# Patient Record
Sex: FEMALE | Race: BLACK OR AFRICAN AMERICAN | NOT HISPANIC OR LATINO | Employment: FULL TIME | URBAN - METROPOLITAN AREA
[De-identification: names, ages, dates, MRNs, and addresses within clinical notes are randomized per-mention and may not be internally consistent; named-entity substitution may affect disease eponyms.]

---

## 2023-05-26 LAB
ALANINE AMINOTRANSFERASE (SGPT) (U/L) IN SER/PLAS: 16 U/L (ref 7–45)
ALBUMIN (G/DL) IN SER/PLAS: 3.7 G/DL (ref 3.4–5)
ALKALINE PHOSPHATASE (U/L) IN SER/PLAS: 58 U/L (ref 33–110)
ANION GAP IN SER/PLAS: 10 MMOL/L (ref 10–20)
ASPARTATE AMINOTRANSFERASE (SGOT) (U/L) IN SER/PLAS: 13 U/L (ref 9–39)
BILIRUBIN TOTAL (MG/DL) IN SER/PLAS: 0.5 MG/DL (ref 0–1.2)
CALCIDIOL (25 OH VITAMIN D3) (NG/ML) IN SER/PLAS: 51 NG/ML
CALCIUM (MG/DL) IN SER/PLAS: 8.4 MG/DL (ref 8.6–10.3)
CARBON DIOXIDE, TOTAL (MMOL/L) IN SER/PLAS: 30 MMOL/L (ref 21–32)
CHLORIDE (MMOL/L) IN SER/PLAS: 103 MMOL/L (ref 98–107)
CHOLESTEROL (MG/DL) IN SER/PLAS: 181 MG/DL (ref 0–199)
CHOLESTEROL IN HDL (MG/DL) IN SER/PLAS: 44.4 MG/DL
CHOLESTEROL/HDL RATIO: 4.1
CREATININE (MG/DL) IN SER/PLAS: 0.68 MG/DL (ref 0.5–1.05)
ERYTHROCYTE DISTRIBUTION WIDTH (RATIO) BY AUTOMATED COUNT: 15.1 % (ref 11.5–14.5)
ERYTHROCYTE MEAN CORPUSCULAR HEMOGLOBIN CONCENTRATION (G/DL) BY AUTOMATED: 31.7 G/DL (ref 32–36)
ERYTHROCYTE MEAN CORPUSCULAR VOLUME (FL) BY AUTOMATED COUNT: 93 FL (ref 80–100)
ERYTHROCYTES (10*6/UL) IN BLOOD BY AUTOMATED COUNT: 4.22 X10E12/L (ref 4–5.2)
ESTIMATED AVERAGE GLUCOSE FOR HBA1C: 137 MG/DL
GFR FEMALE: >90 ML/MIN/1.73M2
GLUCOSE (MG/DL) IN SER/PLAS: 106 MG/DL (ref 74–99)
HEMATOCRIT (%) IN BLOOD BY AUTOMATED COUNT: 39.1 % (ref 36–46)
HEMOGLOBIN (G/DL) IN BLOOD: 12.4 G/DL (ref 12–16)
HEMOGLOBIN A1C/HEMOGLOBIN TOTAL IN BLOOD: 6.4 %
LDL: 117 MG/DL (ref 0–99)
LEUKOCYTES (10*3/UL) IN BLOOD BY AUTOMATED COUNT: 5.4 X10E9/L (ref 4.4–11.3)
PLATELETS (10*3/UL) IN BLOOD AUTOMATED COUNT: 298 X10E9/L (ref 150–450)
POTASSIUM (MMOL/L) IN SER/PLAS: 4.2 MMOL/L (ref 3.5–5.3)
PROTEIN TOTAL: 6 G/DL (ref 6.4–8.2)
SODIUM (MMOL/L) IN SER/PLAS: 139 MMOL/L (ref 136–145)
THYROTROPIN (MIU/L) IN SER/PLAS BY DETECTION LIMIT <= 0.05 MIU/L: 2.14 MIU/L (ref 0.44–3.98)
TRIGLYCERIDE (MG/DL) IN SER/PLAS: 96 MG/DL (ref 0–149)
UREA NITROGEN (MG/DL) IN SER/PLAS: 15 MG/DL (ref 6–23)
VLDL: 19 MG/DL (ref 0–40)

## 2023-06-12 ENCOUNTER — PATIENT OUTREACH (OUTPATIENT)
Dept: CARE COORDINATION | Facility: CLINIC | Age: 53
End: 2023-06-12
Payer: COMMERCIAL

## 2023-06-13 ENCOUNTER — PATIENT OUTREACH (OUTPATIENT)
Dept: CARE COORDINATION | Facility: CLINIC | Age: 53
End: 2023-06-13
Payer: COMMERCIAL

## 2023-08-30 ENCOUNTER — HOSPITAL ENCOUNTER (OUTPATIENT)
Dept: DATA CONVERSION | Facility: HOSPITAL | Age: 53
End: 2023-08-30
Attending: INTERNAL MEDICINE | Admitting: INTERNAL MEDICINE
Payer: COMMERCIAL

## 2023-08-30 DIAGNOSIS — R07.9 CHEST PAIN, UNSPECIFIED: ICD-10-CM

## 2023-08-30 DIAGNOSIS — K31.7 POLYP OF STOMACH AND DUODENUM: ICD-10-CM

## 2023-08-30 DIAGNOSIS — K44.9 DIAPHRAGMATIC HERNIA WITHOUT OBSTRUCTION OR GANGRENE: ICD-10-CM

## 2023-09-06 LAB
COMPLETE PATHOLOGY REPORT: NORMAL
CONVERTED CLINICAL DIAGNOSIS-HISTORY: NORMAL
CONVERTED FINAL DIAGNOSIS: NORMAL
CONVERTED FINAL REPORT PDF LINK TO COPY AND PASTE: NORMAL
CONVERTED GROSS DESCRIPTION: NORMAL

## 2023-09-25 LAB
ALANINE AMINOTRANSFERASE (SGPT) (U/L) IN SER/PLAS: 16 U/L (ref 7–45)
ALBUMIN (G/DL) IN SER/PLAS: 3.8 G/DL (ref 3.4–5)
ALKALINE PHOSPHATASE (U/L) IN SER/PLAS: 56 U/L (ref 33–110)
ANION GAP IN SER/PLAS: 14 MMOL/L (ref 10–20)
ASPARTATE AMINOTRANSFERASE (SGOT) (U/L) IN SER/PLAS: 12 U/L (ref 9–39)
BILIRUBIN TOTAL (MG/DL) IN SER/PLAS: 0.4 MG/DL (ref 0–1.2)
CALCIUM (MG/DL) IN SER/PLAS: 8.5 MG/DL (ref 8.6–10.3)
CARBON DIOXIDE, TOTAL (MMOL/L) IN SER/PLAS: 28 MMOL/L (ref 21–32)
CHLORIDE (MMOL/L) IN SER/PLAS: 103 MMOL/L (ref 98–107)
CHOLESTEROL (MG/DL) IN SER/PLAS: 176 MG/DL (ref 0–199)
CHOLESTEROL IN HDL (MG/DL) IN SER/PLAS: 46.7 MG/DL
CHOLESTEROL/HDL RATIO: 3.8
CREATININE (MG/DL) IN SER/PLAS: 0.75 MG/DL (ref 0.5–1.05)
ESTIMATED AVERAGE GLUCOSE FOR HBA1C: 137 MG/DL
GFR FEMALE: >90 ML/MIN/1.73M2
GLUCOSE (MG/DL) IN SER/PLAS: 125 MG/DL (ref 74–99)
HEMOGLOBIN A1C/HEMOGLOBIN TOTAL IN BLOOD: 6.4 %
LDL: 107 MG/DL (ref 0–99)
POTASSIUM (MMOL/L) IN SER/PLAS: 3.5 MMOL/L (ref 3.5–5.3)
PROTEIN TOTAL: 6.8 G/DL (ref 6.4–8.2)
SODIUM (MMOL/L) IN SER/PLAS: 141 MMOL/L (ref 136–145)
TRIGLYCERIDE (MG/DL) IN SER/PLAS: 113 MG/DL (ref 0–149)
UREA NITROGEN (MG/DL) IN SER/PLAS: 17 MG/DL (ref 6–23)
VLDL: 23 MG/DL (ref 0–40)

## 2023-10-04 ENCOUNTER — LAB (OUTPATIENT)
Dept: LAB | Facility: LAB | Age: 53
End: 2023-10-04
Payer: COMMERCIAL

## 2023-10-04 DIAGNOSIS — I50.32 CHRONIC DIASTOLIC (CONGESTIVE) HEART FAILURE (MULTI): Primary | ICD-10-CM

## 2023-10-04 DIAGNOSIS — E55.9 VITAMIN D DEFICIENCY, UNSPECIFIED: ICD-10-CM

## 2023-10-04 LAB — 25(OH)D3 SERPL-MCNC: 58 NG/ML (ref 30–100)

## 2023-10-04 PROCEDURE — 36415 COLL VENOUS BLD VENIPUNCTURE: CPT

## 2023-10-07 ENCOUNTER — HOSPITAL ENCOUNTER (EMERGENCY)
Facility: HOSPITAL | Age: 53
Discharge: HOME | End: 2023-10-08
Payer: COMMERCIAL

## 2023-10-07 ENCOUNTER — APPOINTMENT (OUTPATIENT)
Dept: RADIOLOGY | Facility: HOSPITAL | Age: 53
End: 2023-10-07
Payer: COMMERCIAL

## 2023-10-07 DIAGNOSIS — M71.22 BAKER'S CYST OF KNEE, LEFT: ICD-10-CM

## 2023-10-07 DIAGNOSIS — M25.461 SWELLING OF BOTH KNEES: Primary | ICD-10-CM

## 2023-10-07 DIAGNOSIS — M25.462 SWELLING OF BOTH KNEES: Primary | ICD-10-CM

## 2023-10-07 PROBLEM — I48.91 ATRIAL FIBRILLATION (MULTI): Status: ACTIVE | Noted: 2023-10-07

## 2023-10-07 LAB
ALBUMIN SERPL BCP-MCNC: 4 G/DL (ref 3.4–5)
ALP SERPL-CCNC: 56 U/L (ref 33–110)
ALT SERPL W P-5'-P-CCNC: 14 U/L (ref 7–45)
ANION GAP SERPL CALC-SCNC: 14 MMOL/L (ref 10–20)
AST SERPL W P-5'-P-CCNC: 13 U/L (ref 9–39)
BASOPHILS # BLD AUTO: 0.02 X10*3/UL (ref 0–0.1)
BASOPHILS NFR BLD AUTO: 0.3 %
BILIRUB SERPL-MCNC: 0.5 MG/DL (ref 0–1.2)
BUN SERPL-MCNC: 20 MG/DL (ref 6–23)
CALCIUM SERPL-MCNC: 9.3 MG/DL (ref 8.6–10.3)
CARDIAC TROPONIN I PNL SERPL HS: 3 NG/L (ref 0–13)
CARDIAC TROPONIN I PNL SERPL HS: <3 NG/L (ref 0–13)
CHLORIDE SERPL-SCNC: 103 MMOL/L (ref 98–107)
CO2 SERPL-SCNC: 27 MMOL/L (ref 21–32)
CREAT SERPL-MCNC: 0.84 MG/DL (ref 0.5–1.05)
D DIMER PPP FEU-MCNC: 1.6 MG/L FEU (ref 0.19–0.5)
EOSINOPHIL # BLD AUTO: 0.12 X10*3/UL (ref 0–0.7)
EOSINOPHIL NFR BLD AUTO: 1.8 %
ERYTHROCYTE [DISTWIDTH] IN BLOOD BY AUTOMATED COUNT: 13.9 % (ref 11.5–14.5)
GFR SERPL CREATININE-BSD FRML MDRD: 84 ML/MIN/1.73M*2
GLUCOSE SERPL-MCNC: 88 MG/DL (ref 74–99)
HCT VFR BLD AUTO: 38.2 % (ref 36–46)
HGB BLD-MCNC: 12.3 G/DL (ref 12–16)
IMM GRANULOCYTES # BLD AUTO: 0.01 X10*3/UL (ref 0–0.7)
IMM GRANULOCYTES NFR BLD AUTO: 0.1 % (ref 0–0.9)
LYMPHOCYTES # BLD AUTO: 2.35 X10*3/UL (ref 1.2–4.8)
LYMPHOCYTES NFR BLD AUTO: 34.6 %
MCH RBC QN AUTO: 29.5 PG (ref 26–34)
MCHC RBC AUTO-ENTMCNC: 32.2 G/DL (ref 32–36)
MCV RBC AUTO: 92 FL (ref 80–100)
MONOCYTES # BLD AUTO: 0.83 X10*3/UL (ref 0.1–1)
MONOCYTES NFR BLD AUTO: 12.2 %
NEUTROPHILS # BLD AUTO: 3.47 X10*3/UL (ref 1.2–7.7)
NEUTROPHILS NFR BLD AUTO: 51 %
NRBC BLD-RTO: NORMAL /100{WBCS}
PLATELET # BLD AUTO: 278 X10*3/UL (ref 150–450)
PMV BLD AUTO: 10.5 FL (ref 7.5–11.5)
POTASSIUM SERPL-SCNC: 3.9 MMOL/L (ref 3.5–5.3)
PROT SERPL-MCNC: 7.2 G/DL (ref 6.4–8.2)
RBC # BLD AUTO: 4.17 X10*6/UL (ref 4–5.2)
SODIUM SERPL-SCNC: 140 MMOL/L (ref 136–145)
WBC # BLD AUTO: 6.8 X10*3/UL (ref 4.4–11.3)

## 2023-10-07 PROCEDURE — 99285 EMERGENCY DEPT VISIT HI MDM: CPT | Mod: 25

## 2023-10-07 PROCEDURE — 2550000001 HC RX 255 CONTRASTS: Performed by: STUDENT IN AN ORGANIZED HEALTH CARE EDUCATION/TRAINING PROGRAM

## 2023-10-07 PROCEDURE — 84484 ASSAY OF TROPONIN QUANT: CPT | Performed by: STUDENT IN AN ORGANIZED HEALTH CARE EDUCATION/TRAINING PROGRAM

## 2023-10-07 PROCEDURE — 36415 COLL VENOUS BLD VENIPUNCTURE: CPT | Performed by: STUDENT IN AN ORGANIZED HEALTH CARE EDUCATION/TRAINING PROGRAM

## 2023-10-07 PROCEDURE — 96374 THER/PROPH/DIAG INJ IV PUSH: CPT | Mod: XU

## 2023-10-07 PROCEDURE — 99284 EMERGENCY DEPT VISIT MOD MDM: CPT

## 2023-10-07 PROCEDURE — 80053 COMPREHEN METABOLIC PANEL: CPT | Performed by: STUDENT IN AN ORGANIZED HEALTH CARE EDUCATION/TRAINING PROGRAM

## 2023-10-07 PROCEDURE — 71275 CT ANGIOGRAPHY CHEST: CPT

## 2023-10-07 PROCEDURE — 85025 COMPLETE CBC W/AUTO DIFF WBC: CPT | Performed by: STUDENT IN AN ORGANIZED HEALTH CARE EDUCATION/TRAINING PROGRAM

## 2023-10-07 PROCEDURE — 2500000001 HC RX 250 WO HCPCS SELF ADMINISTERED DRUGS (ALT 637 FOR MEDICARE OP): Performed by: STUDENT IN AN ORGANIZED HEALTH CARE EDUCATION/TRAINING PROGRAM

## 2023-10-07 PROCEDURE — 2500000004 HC RX 250 GENERAL PHARMACY W/ HCPCS (ALT 636 FOR OP/ED): Performed by: STUDENT IN AN ORGANIZED HEALTH CARE EDUCATION/TRAINING PROGRAM

## 2023-10-07 PROCEDURE — 85300 ANTITHROMBIN III ACTIVITY: CPT | Performed by: STUDENT IN AN ORGANIZED HEALTH CARE EDUCATION/TRAINING PROGRAM

## 2023-10-07 RX ORDER — OMEPRAZOLE 40 MG/1
1 CAPSULE, DELAYED RELEASE ORAL EVERY 24 HOURS
COMMUNITY

## 2023-10-07 RX ORDER — MONTELUKAST SODIUM 10 MG/1
10 TABLET ORAL EVERY 24 HOURS
COMMUNITY

## 2023-10-07 RX ORDER — ERGOCALCIFEROL 1.25 MG/1
1 CAPSULE ORAL
Status: ON HOLD | COMMUNITY
End: 2023-11-28 | Stop reason: WASHOUT

## 2023-10-07 RX ORDER — ALBUTEROL SULFATE 0.83 MG/ML
2.5 SOLUTION RESPIRATORY (INHALATION) EVERY 4 HOURS PRN
Status: ON HOLD | COMMUNITY
Start: 2022-07-25 | End: 2023-11-28 | Stop reason: WASHOUT

## 2023-10-07 RX ORDER — VERAPAMIL HYDROCHLORIDE 300 MG/1
300 CAPSULE, EXTENDED RELEASE ORAL NIGHTLY
COMMUNITY

## 2023-10-07 RX ORDER — LISINOPRIL 40 MG/1
40 TABLET ORAL DAILY
COMMUNITY

## 2023-10-07 RX ORDER — POTASSIUM CHLORIDE 1500 MG/1
20 TABLET, EXTENDED RELEASE ORAL 2 TIMES DAILY
Status: ON HOLD | COMMUNITY
Start: 2023-10-03 | End: 2023-11-29 | Stop reason: SDUPTHER

## 2023-10-07 RX ORDER — ONDANSETRON 4 MG/1
4 TABLET, FILM COATED ORAL EVERY 6 HOURS PRN
COMMUNITY
Start: 2021-01-04

## 2023-10-07 RX ORDER — METOPROLOL TARTRATE 100 MG/1
100 TABLET ORAL 2 TIMES DAILY
COMMUNITY

## 2023-10-07 RX ORDER — OXYCODONE AND ACETAMINOPHEN 5; 325 MG/1; MG/1
1 TABLET ORAL ONCE
Status: COMPLETED | OUTPATIENT
Start: 2023-10-07 | End: 2023-10-07

## 2023-10-07 RX ORDER — FLECAINIDE ACETATE 100 MG/1
100 TABLET ORAL 2 TIMES DAILY
COMMUNITY
Start: 2015-10-14

## 2023-10-07 RX ORDER — ATORVASTATIN CALCIUM 40 MG/1
40 TABLET, FILM COATED ORAL DAILY
COMMUNITY
Start: 2020-09-19

## 2023-10-07 RX ORDER — FUROSEMIDE 40 MG/1
40 TABLET ORAL 2 TIMES DAILY
Status: ON HOLD | COMMUNITY
Start: 2022-11-01 | End: 2023-11-28

## 2023-10-07 RX ORDER — ALBUTEROL SULFATE 90 UG/1
AEROSOL, METERED RESPIRATORY (INHALATION) EVERY 4 HOURS
COMMUNITY
Start: 2022-01-19

## 2023-10-07 RX ORDER — MORPHINE SULFATE 4 MG/ML
4 INJECTION, SOLUTION INTRAMUSCULAR; INTRAVENOUS ONCE
Status: COMPLETED | OUTPATIENT
Start: 2023-10-07 | End: 2023-10-07

## 2023-10-07 RX ORDER — HYDROGEN PEROXIDE 3 %
1 SOLUTION, NON-ORAL MISCELLANEOUS
Status: ON HOLD | COMMUNITY
Start: 2021-02-16 | End: 2023-11-28 | Stop reason: WASHOUT

## 2023-10-07 RX ORDER — MECLIZINE HYDROCHLORIDE 25 MG/1
25 TABLET ORAL EVERY 12 HOURS PRN
COMMUNITY

## 2023-10-07 RX ADMIN — OXYCODONE HYDROCHLORIDE AND ACETAMINOPHEN 1 TABLET: 5; 325 TABLET ORAL at 21:38

## 2023-10-07 RX ADMIN — MORPHINE SULFATE 4 MG: 4 INJECTION, SOLUTION INTRAMUSCULAR; INTRAVENOUS at 20:54

## 2023-10-07 RX ADMIN — IOHEXOL 100 ML: 350 INJECTION, SOLUTION INTRAVENOUS at 21:30

## 2023-10-07 ASSESSMENT — PAIN DESCRIPTION - LOCATION
LOCATION: CHEST
LOCATION_2: LEG
LOCATION: CHEST
LOCATION: CHEST

## 2023-10-07 ASSESSMENT — PAIN DESCRIPTION - PROGRESSION: CLINICAL_PROGRESSION: GRADUALLY IMPROVING

## 2023-10-07 ASSESSMENT — PAIN SCALES - GENERAL
PAINLEVEL_OUTOF10: 10 - WORST POSSIBLE PAIN
PAINLEVEL_OUTOF10: 10 - WORST POSSIBLE PAIN
PAINLEVEL_OUTOF10: 5 - MODERATE PAIN
PAINLEVEL_OUTOF10: 10 - WORST POSSIBLE PAIN
PAINLEVEL_OUTOF10: 10 - WORST POSSIBLE PAIN
PAINLEVEL_OUTOF10: 8
PAINLEVEL_OUTOF10: 8
PAINLEVEL_OUTOF10: 10 - WORST POSSIBLE PAIN
PAINLEVEL_OUTOF10: 3

## 2023-10-07 ASSESSMENT — PAIN DESCRIPTION - DESCRIPTORS
DESCRIPTORS: TIGHTNESS;CRAMPING
DESCRIPTORS: PRESSURE;TIGHTNESS
DESCRIPTORS: PRESSURE;OTHER (COMMENT)
DESCRIPTORS: PRESSURE;TIGHTNESS
DESCRIPTORS: PRESSURE;TIGHTNESS
DESCRIPTORS: TIGHTNESS;PRESSURE
DESCRIPTORS: TIGHTNESS;CRAMPING

## 2023-10-07 ASSESSMENT — COLUMBIA-SUICIDE SEVERITY RATING SCALE - C-SSRS
2. HAVE YOU ACTUALLY HAD ANY THOUGHTS OF KILLING YOURSELF?: NO
6. HAVE YOU EVER DONE ANYTHING, STARTED TO DO ANYTHING, OR PREPARED TO DO ANYTHING TO END YOUR LIFE?: NO
1. IN THE PAST MONTH, HAVE YOU WISHED YOU WERE DEAD OR WISHED YOU COULD GO TO SLEEP AND NOT WAKE UP?: NO

## 2023-10-07 ASSESSMENT — PAIN DESCRIPTION - ORIENTATION
ORIENTATION_2: LEFT
ORIENTATION: MID

## 2023-10-07 ASSESSMENT — PAIN DESCRIPTION - PAIN TYPE
TYPE: ACUTE PAIN

## 2023-10-07 ASSESSMENT — PAIN - FUNCTIONAL ASSESSMENT
PAIN_FUNCTIONAL_ASSESSMENT: 0-10
PAIN_FUNCTIONAL_ASSESSMENT: 0-10

## 2023-10-07 ASSESSMENT — PAIN DESCRIPTION - ONSET
ONSET: ONGOING
ONSET: ONGOING

## 2023-10-07 ASSESSMENT — PAIN DESCRIPTION - FREQUENCY
FREQUENCY: CONSTANT/CONTINUOUS

## 2023-10-08 ENCOUNTER — APPOINTMENT (OUTPATIENT)
Dept: RADIOLOGY | Facility: HOSPITAL | Age: 53
End: 2023-10-08
Payer: COMMERCIAL

## 2023-10-08 VITALS
TEMPERATURE: 97.8 F | DIASTOLIC BLOOD PRESSURE: 68 MMHG | OXYGEN SATURATION: 99 % | WEIGHT: 288.36 LBS | RESPIRATION RATE: 14 BRPM | HEIGHT: 65 IN | SYSTOLIC BLOOD PRESSURE: 101 MMHG | HEART RATE: 64 BPM | BODY MASS INDEX: 48.04 KG/M2

## 2023-10-08 PROCEDURE — 2500000004 HC RX 250 GENERAL PHARMACY W/ HCPCS (ALT 636 FOR OP/ED): Performed by: STUDENT IN AN ORGANIZED HEALTH CARE EDUCATION/TRAINING PROGRAM

## 2023-10-08 PROCEDURE — 93970 EXTREMITY STUDY: CPT

## 2023-10-08 RX ORDER — PREDNISONE 20 MG/1
40 TABLET ORAL ONCE
Status: COMPLETED | OUTPATIENT
Start: 2023-10-08 | End: 2023-10-08

## 2023-10-08 RX ORDER — METHYLPREDNISOLONE 4 MG/1
TABLET ORAL
Qty: 21 TABLET | Refills: 0 | Status: SHIPPED | OUTPATIENT
Start: 2023-10-08 | End: 2023-10-15

## 2023-10-08 RX ORDER — DICLOFENAC SODIUM 10 MG/G
4 GEL TOPICAL 4 TIMES DAILY PRN
Qty: 100 G | Refills: 0 | Status: SHIPPED | OUTPATIENT
Start: 2023-10-08

## 2023-10-08 RX ADMIN — PREDNISONE 40 MG: 20 TABLET ORAL at 00:58

## 2023-10-08 ASSESSMENT — PAIN DESCRIPTION - LOCATION
LOCATION_2: LEG
LOCATION: CHEST

## 2023-10-08 ASSESSMENT — PAIN DESCRIPTION - PAIN TYPE: TYPE_2: ACUTE PAIN

## 2023-10-08 ASSESSMENT — PAIN DESCRIPTION - DESCRIPTORS: DESCRIPTORS_2: CRAMPING;TIGHTNESS

## 2023-10-08 ASSESSMENT — PAIN DESCRIPTION - PROGRESSION: CLINICAL_PROGRESSION_2: NOT CHANGED

## 2023-10-08 ASSESSMENT — PAIN SCALES - GENERAL
PAINLEVEL_OUTOF10: 10 - WORST POSSIBLE PAIN
PAINLEVEL_OUTOF10: 0 - NO PAIN

## 2023-10-08 ASSESSMENT — PAIN - FUNCTIONAL ASSESSMENT: PAIN_FUNCTIONAL_ASSESSMENT: 0-10

## 2023-10-08 NOTE — ED TRIAGE NOTES
Patient triage completed. Patient Aox4. Currently VSS, c/o chest pain and LLE pain. On monitor w/ VS Q5 minutes. Labs and ECG completed.

## 2023-10-08 NOTE — ED PROVIDER NOTES
HPI   Chief Complaint   Patient presents with    Weakness, Gen    Dizziness    Chest Pain    Left Leg pain     Left calf pain had started days ago per patient then improved, now both legs swollen w/ Left leg pain in calf 10/10.       This is a 53-year-old female with history of atrial fibrillation and history of DVT on Eliquis who presents to the ED with several days of left lower extremity swelling/pain, chest heaviness, dyspnea, and lightheadedness.  States that her symptoms feel similar to when she had DVT/PE in the past.  She does report that she had endoscopy done a few weeks ago when she had to hold her Eliquis for 3 days, and also reports missing a dose recently on accident.  Denies any cough, fever/chills, or GI complaints.           Review of Systems   All other systems reviewed and are negative.                   Iman Coma Scale Score: 15                  Patient History   Past Medical History:   Diagnosis Date    Asthma     Atrial fibrillation (CMS/HCC)     Hypertension     Personal history of other diseases of the circulatory system     History of hypertension    Personal history of other specified conditions     History of ulceration     Past Surgical History:   Procedure Laterality Date    CT ANGIO CORONARY ART WITH HEARTFLOW IF SCORE >30%  11/18/2022    CT HEART CORONARY ANGIOGRAM 11/18/2022 DOCTOR OFFICE LEGACY    FOOT SURGERY  03/19/2018    Foot Surgery    HYSTERECTOMY  03/19/2018    Hysterectomy    KNEE ARTHROSCOPY W/ DEBRIDEMENT  10/21/2013    Arthroscopy Knee Right    MR HEAD ANGIO WO IV CONTRAST  4/5/2019    MR HEAD ANGIO WO IV CONTRAST 4/5/2019 [facility] ANCILLARY LEGACY    MR NECK ANGIO WO IV CONTRAST  4/5/2019    MR NECK ANGIO WO IV CONTRAST 4/5/2019 [facility] ANCILLARY LEGACY     No family history on file.  Social History     Tobacco Use    Smoking status: Never    Smokeless tobacco: Never   Vaping Use    Vaping Use: Never used   Substance Use Topics    Alcohol use: Yes     Alcohol/week: 2.0 standard  drinks of alcohol     Types: 2 Standard drinks or equivalent per week    Drug use: Never       Physical Exam   ED Triage Vitals [10/07/23 1943]   Temp Heart Rate Resp BP   37 °C (98.6 °F) 69 18 (!) 147/95      SpO2 Temp Source Heart Rate Source Patient Position   97 % Oral Monitor Lying      BP Location FiO2 (%)     Right arm --       Physical Exam  Vitals and nursing note reviewed.   Constitutional:       General: She is not in acute distress.     Appearance: She is well-developed.   HENT:      Head: Normocephalic and atraumatic.   Eyes:      Conjunctiva/sclera: Conjunctivae normal.   Cardiovascular:      Rate and Rhythm: Normal rate and regular rhythm.      Heart sounds: No murmur heard.  Pulmonary:      Effort: Pulmonary effort is normal. No respiratory distress.      Breath sounds: Normal breath sounds.   Abdominal:      Palpations: Abdomen is soft.      Tenderness: There is no abdominal tenderness.   Musculoskeletal:         General: No swelling.      Cervical back: Neck supple.      Left lower le+ Pitting Edema present.   Skin:     General: Skin is warm and dry.   Neurological:      General: No focal deficit present.      Mental Status: She is alert and oriented to person, place, and time.   Psychiatric:         Mood and Affect: Mood normal.         Behavior: Behavior normal.       Labs Reviewed   D-DIMER, NON VTE - Abnormal       Result Value    D-Dimer Non VTE, Quant (mg/L FEU) 1.60 (*)     Narrative:     THROMBOEMBOLIC EVENTS CANNOT BE EXCLUDED SOLELY ON THE BASIS OF THE D-DIMER LEVEL BEING WITHIN THE NORMAL REFERENCE RANGE. D-DIMER LEVELS LESS THAN 0.5 MG/L FEU IN CONJUNCTION WITH A LOW CLINICAL PROBABILITY HAVE AN EXCELLENT NEGATIVE PREDICTIVE VALUE IN EXCLUDING A DIAGNOSIS OF PULMONARY EMBOLUS (PE) OR DEEP VEIN THROMBOSIS (DVT). ELEVATED D-DIMER LEVELS ARE NOT SPECIFIC TO PE OR DVT, AND MAY BE SEEN IN PATIENTS WITH DIC, ADVANCED AGE, PREGNANCY, MALIGNANCY, LIVER DISEASE, INFECTION, AND INFLAMMATORY  CONDITIONS AMONG OTHERS. D-DIMER LEVELS MAY BE DECREASED IN PATIENTS RECEIVING ANTI-COAGULATION THERAPY.   COMPREHENSIVE METABOLIC PANEL - Normal    Glucose 88      Sodium 140      Potassium 3.9      Chloride 103      Bicarbonate 27      Anion Gap 14      Urea Nitrogen 20      Creatinine 0.84      eGFR 84      Calcium 9.3      Albumin 4.0      Alkaline Phosphatase 56      Total Protein 7.2      AST 13      Bilirubin, Total 0.5      ALT 14     SERIAL TROPONIN-INITIAL - Normal    Troponin I, High Sensitivity 3     TROPONIN I, HIGH SENSITIVITY - Normal    Troponin I, High Sensitivity <3     TROPONIN SERIES- (INITIAL, 1 HR)    Narrative:     The following orders were created for panel order Troponin I Series, High Sensitivity (0, 1 HR).  Procedure                               Abnormality         Status                     ---------                               -----------         ------                     Troponin I, High Sensiti...[[id number]]  Normal              Final result               Troponin, High Sensitivi...[[id number]]                                                   Please view results for these tests on the individual orders.   CBC WITH AUTO DIFFERENTIAL    WBC 6.8      nRBC        RBC 4.17      Hemoglobin 12.3      Hematocrit 38.2      MCV 92      MCH 29.5      MCHC 32.2      RDW 13.9      Platelets 278      MPV 10.5      Neutrophils % 51.0      Immature Granulocytes %, Automated 0.1      Lymphocytes % 34.6      Monocytes % 12.2      Eosinophils % 1.8      Basophils % 0.3      Neutrophils Absolute 3.47      Immature Granulocytes Absolute, Automated 0.01      Lymphocytes Absolute 2.35      Monocytes Absolute 0.83      Eosinophils Absolute 0.12      Basophils Absolute 0.02       CT angio chest for pulmonary embolism   Final Result   1. No pulmonary embolus.   2. No acute intrathoracic process.             Signed by: Chucky Otoole 10/7/2023 10:31 PM   Dictation workstation:   [id number]      Vascular  US Lower Extremity Venous Duplex Bilateral    (Results Pending)     CT angio chest for pulmonary embolism   Final Result   1. No pulmonary embolus.   2. No acute intrathoracic process.             Signed by: Chucky Otoole 10/7/2023 10:31 PM   Dictation workstation:   [id number]      Vascular US Lower Extremity Venous Duplex Bilateral    (Results Pending)       ED Course & MDM   Diagnoses as of 10/08/23 0056   Baker's cyst of knee, left       Medical Decision Making  Patient presented to the ED with lower extremity swelling, chest pain, and dyspnea.  Cardiac work-up in the ED was negative including serial troponins, EKG, exam, and imaging.  D-dimer was elevated, and since patient had missed several doses of Eliquis recently, elected to do CTA of the chest and bilateral lower extremity venous duplexes.  No VTE was found.  Patient did have a complex Baker's cyst of the left popliteal fossa, which is likely causing her pain and distal swelling.  Recommended topical diclofenac and will also prescribe patient a Medrol Dosepak to help with the inflammation.  She has an appointment scheduled with her orthopedist next week regarding scheduling a TKA.  Chest pain and dyspnea resolved throughout the ED course, and patient was also medicated with morphine and Percocet with resolution of her pain.           Blanca Hernandez MD  10/08/23 0059

## 2023-10-17 PROBLEM — M75.102 LEFT ROTATOR CUFF TEAR: Status: ACTIVE | Noted: 2023-10-17

## 2023-10-17 PROBLEM — M79.89 SHOULDER SWELLING: Status: ACTIVE | Noted: 2023-10-17

## 2023-10-17 PROBLEM — K43.6 INCARCERATED EPIGASTRIC HERNIA: Status: ACTIVE | Noted: 2023-10-17

## 2023-10-17 PROBLEM — E66.01 MORBID OBESITY (MULTI): Status: ACTIVE | Noted: 2023-10-17

## 2023-10-17 PROBLEM — K76.9 LIVER LESION: Status: ACTIVE | Noted: 2023-10-17

## 2023-10-17 PROBLEM — M75.22 BICEPS TENDINITIS OF LEFT UPPER EXTREMITY: Status: ACTIVE | Noted: 2023-10-17

## 2023-10-17 PROBLEM — G44.049 CHRONIC PAROXYSMAL HEMICRANIA, NOT INTRACTABLE: Status: ACTIVE | Noted: 2019-06-05

## 2023-10-17 PROBLEM — R25.2 TRISMUS: Status: ACTIVE | Noted: 2019-09-05

## 2023-10-17 PROBLEM — R42 DIZZINESS: Status: ACTIVE | Noted: 2022-05-01

## 2023-10-17 PROBLEM — K21.9 GASTROESOPHAGEAL REFLUX DISEASE: Status: ACTIVE | Noted: 2022-11-30

## 2023-10-17 PROBLEM — R50.9 FEVER: Status: ACTIVE | Noted: 2023-10-17

## 2023-10-17 PROBLEM — K64.4 ANAL SKIN TAG: Status: ACTIVE | Noted: 2018-02-22

## 2023-10-17 PROBLEM — R11.0 NAUSEA: Status: ACTIVE | Noted: 2023-10-17

## 2023-10-17 PROBLEM — S00.93XA HEAD CONTUSION: Status: ACTIVE | Noted: 2023-10-17

## 2023-10-17 PROBLEM — N39.3 STRESS INCONTINENCE IN FEMALE: Status: ACTIVE | Noted: 2020-08-18

## 2023-10-17 PROBLEM — M75.42 IMPINGEMENT SYNDROME OF LEFT SHOULDER: Status: ACTIVE | Noted: 2023-10-17

## 2023-10-17 PROBLEM — M17.12 LEFT KNEE DJD: Status: ACTIVE | Noted: 2023-10-17

## 2023-10-17 PROBLEM — H93.8X1 EAR PRESSURE, RIGHT: Status: ACTIVE | Noted: 2019-08-06

## 2023-10-17 PROBLEM — M20.42 HAMMER TOE OF LEFT FOOT: Status: ACTIVE | Noted: 2023-02-16

## 2023-10-17 PROBLEM — J11.1 FLU SYNDROME: Status: ACTIVE | Noted: 2023-10-17

## 2023-10-17 PROBLEM — R35.1 NOCTURIA: Status: ACTIVE | Noted: 2021-08-23

## 2023-10-17 PROBLEM — J34.89 SINUS PRESSURE: Status: ACTIVE | Noted: 2019-06-02

## 2023-10-17 PROBLEM — H91.91 HEARING LOSS OF RIGHT EAR: Status: ACTIVE | Noted: 2019-08-06

## 2023-10-17 PROBLEM — I10 HYPERTENSION: Status: ACTIVE | Noted: 2023-10-17

## 2023-10-17 PROBLEM — H92.01 ACUTE OTALGIA, RIGHT: Status: ACTIVE | Noted: 2019-09-05

## 2023-10-17 PROBLEM — F40.243 FEAR OF FLYING: Status: ACTIVE | Noted: 2019-10-16

## 2023-10-17 PROBLEM — I73.9 CLAUDICATION (CMS-HCC): Status: ACTIVE | Noted: 2023-10-17

## 2023-10-17 PROBLEM — M25.462 EFFUSION OF LEFT KNEE: Status: ACTIVE | Noted: 2023-10-17

## 2023-10-17 PROBLEM — J34.1 CYST OF MAXILLARY SINUS: Status: ACTIVE | Noted: 2019-06-05

## 2023-10-17 PROBLEM — D16.9 OSTEOMA: Status: ACTIVE | Noted: 2023-10-17

## 2023-10-17 PROBLEM — M76.811 ANTERIOR TIBIALIS TENDINITIS OF RIGHT LOWER EXTREMITY: Status: ACTIVE | Noted: 2023-10-17

## 2023-10-17 PROBLEM — R14.0 GASSINESS: Status: ACTIVE | Noted: 2022-11-30

## 2023-10-17 PROBLEM — G45.9 TIA (TRANSIENT ISCHEMIC ATTACK): Status: ACTIVE | Noted: 2022-04-30

## 2023-10-17 RX ORDER — LORATADINE 10 MG/1
1 TABLET ORAL DAILY
COMMUNITY
Start: 2020-02-29

## 2023-10-17 RX ORDER — PANTOPRAZOLE SODIUM 40 MG/1
40 TABLET, DELAYED RELEASE ORAL
Status: ON HOLD | COMMUNITY
Start: 2022-11-01 | End: 2023-11-28 | Stop reason: WASHOUT

## 2023-10-17 RX ORDER — LIDOCAINE 50 MG/G
PATCH TOPICAL
COMMUNITY

## 2023-10-17 RX ORDER — METHOCARBAMOL 500 MG/1
TABLET, FILM COATED ORAL
COMMUNITY
Start: 2023-06-11

## 2023-10-17 RX ORDER — FLUTICASONE FUROATE, UMECLIDINIUM BROMIDE AND VILANTEROL TRIFENATATE 200; 62.5; 25 UG/1; UG/1; UG/1
1 POWDER RESPIRATORY (INHALATION)
Status: ON HOLD | COMMUNITY
Start: 2023-05-04 | End: 2023-11-28 | Stop reason: WASHOUT

## 2023-10-17 RX ORDER — AZELASTINE 1 MG/ML
1 SPRAY, METERED NASAL EVERY 12 HOURS PRN
COMMUNITY
Start: 2019-09-05

## 2023-10-17 RX ORDER — PREDNISONE 10 MG/1
10 TABLET ORAL
COMMUNITY
Start: 2022-11-01

## 2023-10-17 RX ORDER — OXYCODONE HYDROCHLORIDE 5 MG/1
5 TABLET ORAL EVERY 6 HOURS PRN
Status: ON HOLD | COMMUNITY
Start: 2023-06-12 | End: 2023-11-28 | Stop reason: WASHOUT

## 2023-10-17 RX ORDER — PREDNISONE 20 MG/1
TABLET ORAL
Status: ON HOLD | COMMUNITY
End: 2023-11-28 | Stop reason: WASHOUT

## 2023-10-17 RX ORDER — TOPIRAMATE 100 MG/1
TABLET, FILM COATED ORAL
COMMUNITY
Start: 2020-11-18

## 2023-10-17 RX ORDER — GABAPENTIN 600 MG/1
TABLET ORAL
Status: ON HOLD | COMMUNITY
Start: 2023-10-01 | End: 2023-11-28 | Stop reason: WASHOUT

## 2023-10-17 RX ORDER — TRAMADOL HYDROCHLORIDE 50 MG/1
50 TABLET ORAL
COMMUNITY
Start: 2023-07-17

## 2023-10-17 RX ORDER — GABAPENTIN 300 MG/1
CAPSULE ORAL
Status: ON HOLD | COMMUNITY
Start: 2023-07-24 | End: 2023-11-28 | Stop reason: WASHOUT

## 2023-10-17 RX ORDER — IPRATROPIUM BROMIDE AND ALBUTEROL SULFATE 2.5; .5 MG/3ML; MG/3ML
3 SOLUTION RESPIRATORY (INHALATION)
COMMUNITY
Start: 2020-08-11

## 2023-10-17 RX ORDER — TOPIRAMATE 25 MG/1
TABLET ORAL
Status: ON HOLD | COMMUNITY
Start: 2020-10-28 | End: 2023-11-28 | Stop reason: WASHOUT

## 2023-10-17 RX ORDER — COVID-19 ANTIGEN TEST
KIT MISCELLANEOUS
Status: ON HOLD | COMMUNITY
Start: 2022-11-19 | End: 2023-11-28 | Stop reason: WASHOUT

## 2023-10-17 RX ORDER — ORPHENADRINE CITRATE 100 MG/1
TABLET, EXTENDED RELEASE ORAL
Status: ON HOLD | COMMUNITY
Start: 2023-06-04 | End: 2023-11-28 | Stop reason: WASHOUT

## 2023-10-17 RX ORDER — DESIPRAMINE HYDROCHLORIDE 10 MG/1
TABLET ORAL
Status: ON HOLD | COMMUNITY
Start: 2019-11-15 | End: 2023-11-28 | Stop reason: WASHOUT

## 2023-10-17 RX ORDER — ASPIRIN 325 MG
TABLET, DELAYED RELEASE (ENTERIC COATED) ORAL
COMMUNITY

## 2023-10-17 RX ORDER — OXYCODONE AND ACETAMINOPHEN 5; 325 MG/1; MG/1
1-2 TABLET ORAL EVERY 4 HOURS PRN
Status: ON HOLD | COMMUNITY
Start: 2021-01-04 | End: 2023-11-28 | Stop reason: WASHOUT

## 2023-10-17 RX ORDER — ACETAMINOPHEN 325 MG/1
975 TABLET ORAL EVERY 8 HOURS
COMMUNITY
Start: 2023-06-11

## 2023-10-19 ENCOUNTER — OFFICE VISIT (OUTPATIENT)
Dept: ORTHOPEDIC SURGERY | Facility: CLINIC | Age: 53
End: 2023-10-19
Payer: COMMERCIAL

## 2023-10-19 ENCOUNTER — TELEPHONE (OUTPATIENT)
Dept: ORTHOPEDIC SURGERY | Facility: HOSPITAL | Age: 53
End: 2023-10-19

## 2023-10-19 DIAGNOSIS — M17.12 UNILATERAL PRIMARY OSTEOARTHRITIS, LEFT KNEE: Primary | ICD-10-CM

## 2023-10-19 PROCEDURE — 3008F BODY MASS INDEX DOCD: CPT | Performed by: PHYSICIAN ASSISTANT

## 2023-10-19 PROCEDURE — 1036F TOBACCO NON-USER: CPT | Performed by: PHYSICIAN ASSISTANT

## 2023-10-19 PROCEDURE — 99214 OFFICE O/P EST MOD 30 MIN: CPT | Performed by: PHYSICIAN ASSISTANT

## 2023-10-19 ASSESSMENT — PAIN SCALES - GENERAL: PAINLEVEL_OUTOF10: 5 - MODERATE PAIN

## 2023-10-19 ASSESSMENT — PAIN - FUNCTIONAL ASSESSMENT: PAIN_FUNCTIONAL_ASSESSMENT: 0-10

## 2023-10-19 NOTE — TELEPHONE ENCOUNTER
Patient insist on getting a surgery date scheduled with a BMI 46.5.  should I offer her a surgery date as the patient states you are aware she really need the surgery.  I do not see an exception stated in the notes.    Height 5'5  Weight 179.5 lbs  [employer] 238 lbs  Current BMI over 40    Thanks,  Blake Elliott

## 2023-10-19 NOTE — PROGRESS NOTES
ANDRES Baig, PAChloeC, ATC  Adult Reconstruction and Joint Replacement Surgery  Phone: [phone]     Fax:[phone]          Chief Complaint   Patient presents with    Left Knee - Pain         HPI:      Betzaida Jc is a pleasant 53 y.o. year-old female who is seen today for follow-up knee osteoarthritis.  She is a former patient of Dr. Lamar and has been receiving cortisone injections.  The patient denies any history of inflammatory arthritis. Patient denies any new injury or trauma. The patient notes significant loss in range of motion making it difficult go up and down stairs and walk any sort of long distance. They have failed a greater than 3 month trial of conservative treatment including ice, NSAIDS, physical therapy and cortisone injections.  She is fed up with her quality of life.  She would like to consider a left total knee arthroplasty.  She is on Eliquis for A-fib.  She recently saw her cardiologist and is cleared for surgery.    History of Knee Surgery  no    Pain level: 5  Aggravating Factorswalking, going up stairs, and going down stairs  Alleviating Factors  none    Back pain: No  Radicular symptoms side: none      @ROS@    There were no vitals filed for this visit.    Past Medical History:   Diagnosis Date    Asthma     Atrial fibrillation (CMS/[facility])     Hypertension     Personal history of other diseases of the circulatory system     History of hypertension    Personal history of other specified conditions     History of ulceration       Medication Documentation Review Audit       Reviewed by Shelby Young RN (Registered Nurse) on 10/07/23 at 2005      Medication Order Taking? Sig Documenting Provider Last Dose Status   albuterol 2.5 mg /3 mL (0.083 %) nebulizer solution [id number] Yes Inhale 3 mL (2.5 mg) every 4 hours if needed. Historical Provider, MD  Active   albuterol 90 mcg/actuation inhaler [id number] Yes every 4 hours. Historical Provider, MD  Active    apixaban (Eliquis) 5 mg tablet [id number] Yes Take 1 tablet (5 mg) by mouth twice a day. Angela Rodriguez MD  Active   atorvastatin (Lipitor) 40 mg tablet [id number] Yes Take 1 tablet (40 mg) by mouth once daily. Angela Rodriguez MD  Active   ergocalciferol (Vitamin D-2) 1.25 MG (30186 UT) capsule [id number]  Take 1 capsule (1,250 mcg) by mouth 1 (one) time per week. Every other week Angela Rodriguez MD  Active   esomeprazole (NexIUM) 20 mg DR capsule [id number] Yes Take 1 capsule (20 mg) by mouth once daily in the morning. Take before meals. Angela Rodriguez MD  Active   flecainide (Tambocor) 100 mg tablet [id number] Yes Take 1 tablet (100 mg) by mouth 2 times a day. Angela Rodriguez MD  Active   furosemide (Lasix) 40 mg tablet [id number] Yes Take 1 tablet (40 mg) by mouth 2 times a day. 20 tabs in morning and 1 at night, new instructions from . Angela Rodriguez MD  Active   Klor-Con M20 20 mEq ER tablet [id number] Yes 1 tablet (20 mEq) 2 times a day. Angela Rodriguez MD  Active   linaCLOtide (Linzess) 145 mcg capsule [id number] Yes Take 1 tablet by mouth once daily as needed. Angela Rodriguez MD  Active   lisinopril 40 mg tablet [id number]  Take 1 tablet (40 mg) by mouth once daily. Angela Rodriguez MD  Active   meclizine (Antivert) 25 mg tablet [id number]  Take 1 tablet (25 mg) by mouth every 12 hours if needed. Angela Rodriguez MD  Active   metoprolol tartrate (Lopressor) 100 mg tablet [id number]  Take 1 tablet (100 mg) by mouth 2 times a day. Angela Rodriguez MD  Active   montelukast (Singulair) 10 mg tablet [id number]  Take 1 tablet (10 mg) by mouth once every 24 hours. Angela Rodriguez MD  Active   omeprazole (PriLOSEC) 40 mg DR capsule [id number]  1 capsule (40 mg) once every 24 hours. Angela Rodriguez MD  Active   ondansetron (Zofran) 4 mg tablet [id number] Yes Take 1 tablet (4 mg) by mouth every 6 hours if needed for nausea or vomiting. Angela Rodriguez MD   Active   verapamil ER (Veralan PM) 300 mg 24 hr capsule [id number]  Take 1 capsule (300 mg) by mouth once daily at bedtime. Historical Provider, MD  Active                    Allergies   Allergen Reactions    Penicillins Other, Shortness of breath and Unknown     asthma    Sulfa (Sulfonamide Antibiotics) Other     Asthma        Social History     Socioeconomic History    Marital status: Single     Spouse name: Not on file    Number of children: Not on file    Years of education: Not on file    Highest education level: Not on file   Occupational History    Not on file   Tobacco Use    Smoking status: Never    Smokeless tobacco: Never   Vaping Use    Vaping Use: Never used   Substance and Sexual Activity    Alcohol use: Yes     Alcohol/week: 2.0 standard drinks of alcohol     Types: 2 Standard drinks or equivalent per week    Drug use: Never    Sexual activity: Not on file   Other Topics Concern    Not on file   Social History Narrative    Not on file     Social Determinants of Health     Financial Resource Strain: Not on file   Food Insecurity: Not on file   Transportation Needs: Not on file   Physical Activity: Not on file   Stress: Not on file   Social Connections: Not on file   Intimate Partner Violence: Not on file   Housing Stability: Not on file       Past Surgical History:   Procedure Laterality Date    CT ANGIO CORONARY ART WITH HEARTFLOW IF SCORE >30%  11/18/2022    CT HEART CORONARY ANGIOGRAM 11/18/2022 DOCTOR OFFICE LEGACY    FOOT SURGERY  03/19/2018    Foot Surgery    HYSTERECTOMY  03/19/2018    Hysterectomy    KNEE ARTHROSCOPY W/ DEBRIDEMENT  10/21/2013    Arthroscopy Knee Right    MR HEAD ANGIO WO IV CONTRAST  4/5/2019    MR HEAD ANGIO WO IV CONTRAST 4/5/2019 [facility] ANCILLARY LEGACY    MR NECK ANGIO WO IV CONTRAST  4/5/2019    MR NECK ANGIO WO IV CONTRAST 4/5/2019 [facility] ANCILLARY LEGACY       There is no height or weight on file to calculate BMI.    Physical Exam:  side: left Knee:  General: Well-appearing  female in no acute distress.  Awake, alert and oriented.  Pleasant and cooperative.  Respiratory: Non-labored breathing  Mood: Euthymic   Gait: Antalgic  Assistive Device: no device  Skin: warm, dry and intact without lesions    Tenderness to palpation over medial, Joint line.  Effusion: mild  ROM Extension: 0 Flexion: 115  Valgus Stress 0 degrees-Negative  Valgus Stress 30 degrees-Negative  Varus Stress 0 degrees-Negative  Valgus Stress 30 degrees-Negative  Instability in the AP plane at 90 degrees No  Lachmans 1+  Anterior Drawer-Negative  Extensor Mechanism-intact, Patellar tendon non-tender  5/5 knee extension  5/5 knee flexion, DF/PF/EHL  SILT in a hamlet/saph/per/tib distribution  Neuro L5-S1 sensation intact bilaterally, No clonus. 2+ symmetric patella and achilles reflexes bilateral.  2+ Femoral/DP/PT pulses bilaterally  Compartments supple and non-tender.  Extremities warm and well perfused.      Imaging:  [unfilled]    Imaging-4 view xrays of the side: left knee were reviewed and interpreted in clinic today. The findings were reviewed with the patient. There are Advanced joint space narrowing with underlying osteophytic, sclerotic change and cystic changes. No fractures or dislocation. Mild soft tissue swelling and effusion noted.    Impression/Plan:    Betzaida Jc and I discussed the etiology of her symptoms. We discussed in detail a treatment plan that she is comfortable with.     Known severe Osteoarthritis side: left Knee. We reviewed an evidence-based approach to osteoarthritis of the knee.   2. I  discussed non-operative treatments for the patients' arthritis in detail and briefly discussed indications for surgery vs conservative treatment. The patient has elected to try  surgery.  She was given our office card and number to call to schedule at her earliest convenience.  .  She also understands he needs to see Dr.Steven Perrin at least 2 months prior to surgery for a full surgical  consultation.     All questions were answered.     Follow-up  prn .    ANDRES Baig, PA-C, ATC  Orthopedic Physician Assisant  Adult Reconstruction and Total Joint Replacement  General Orthopedics  Department of Orthopaedic Surgery  [place] [id number]  Access Network messaging preferred

## 2023-10-23 ENCOUNTER — OFFICE VISIT (OUTPATIENT)
Dept: ORTHOPEDIC SURGERY | Facility: CLINIC | Age: 53
End: 2023-10-23
Payer: COMMERCIAL

## 2023-10-23 DIAGNOSIS — M17.12 ARTHRITIS OF LEFT KNEE: Primary | ICD-10-CM

## 2023-10-23 DIAGNOSIS — M25.562 LEFT KNEE PAIN, UNSPECIFIED CHRONICITY: ICD-10-CM

## 2023-10-23 PROCEDURE — 1036F TOBACCO NON-USER: CPT | Performed by: STUDENT IN AN ORGANIZED HEALTH CARE EDUCATION/TRAINING PROGRAM

## 2023-10-23 PROCEDURE — 99204 OFFICE O/P NEW MOD 45 MIN: CPT | Performed by: STUDENT IN AN ORGANIZED HEALTH CARE EDUCATION/TRAINING PROGRAM

## 2023-10-23 PROCEDURE — 3008F BODY MASS INDEX DOCD: CPT | Performed by: STUDENT IN AN ORGANIZED HEALTH CARE EDUCATION/TRAINING PROGRAM

## 2023-10-23 ASSESSMENT — PAIN SCALES - GENERAL: PAINLEVEL_OUTOF10: 6

## 2023-10-23 ASSESSMENT — PAIN - FUNCTIONAL ASSESSMENT: PAIN_FUNCTIONAL_ASSESSMENT: 0-10

## 2023-10-23 NOTE — PROGRESS NOTES
Paula Lai MD   Adult Reconstruction and Joint Replacement Surgery  Phone: [phone]     Fax: [phone]     INITIAL CONSULTATION    Name: Betzaida Jc  : 1970  Date of Visit:  10/23/2023    CC: Left knee pain    Clinical History:  Betzaida Jc is a 53 y.o. female who presents with several years of LEFT knee pain.     Patient has tried the following Ice, NSAIDs, Activity modification, Physical therapy, Corticosteroid injections , Xray, and Surgery . Patient does not have pain at night. Patient is able to walk 2-3 blocks. Patient is currently using nothing as assistive device. Primarily complains of diffuse and medial pain. Patient has difficulty with climbing stairs, descending stairs, walking , getting up from a chair, prolonged sitting , and walking on unlevel surfaces . The pain is significantly impacting her ability to perform activities of daily living. Patient reports no longer able to do activities such as walk without pain.     Date of last steroid injection: 2023    Prior hip/knee replacement: No    PROMs  No questionnaires on file.    Past Medical History:   Diagnosis Date    Asthma     Atrial fibrillation (CMS/HCC)     Hypertension     Personal history of other diseases of the circulatory system     History of hypertension    Personal history of other specified conditions     History of ulceration     The patient denies a history of deep vein thrombosis, pulmonary embolism or problems with anesthesia in the past.    Past Surgical History:   Procedure Laterality Date    CT ANGIO CORONARY ART WITH HEARTFLOW IF SCORE >30%  2022    CT HEART CORONARY ANGIOGRAM 2022 DOCTOR OFFICE LEGACY    FOOT SURGERY  2018    Foot Surgery    HYSTERECTOMY  2018    Hysterectomy    KNEE ARTHROSCOPY W/ DEBRIDEMENT  10/21/2013    Arthroscopy Knee Right    MR HEAD ANGIO WO IV CONTRAST  2019    MR HEAD ANGIO WO [place] CONTRAST [place] [facility] [place]    MR NECK ANGIO WO  IV CONTRAST  4/5/2019    MR NECK ANGIO WO IV CONTRAST 4/5/2019 [facility] [place] [facility]GAC[facility]       Allergies: She is allergic to penicillins and sulfa (sulfonamide antibiotics).     Medications:  Current Outpatient Medications   Medication Instructions    acetaminophen (TYLENOL) 975 mg, oral, Every 8 hours    albuterol 90 mcg/actuation inhaler Every 4 hours    albuterol 2.5 mg, inhalation, Every 4 hours PRN    apixaban (ELIQUIS) 5 mg, oral, 2 times daily    atorvastatin (LIPITOR) 40 mg, oral, Daily    azelastine (Astelin) 137 mcg (0.1 %) nasal spray 1 spray, Does not apply, Every 12 hours PRN    cholecalciferol (Vitamin D-3) 50,000 unit capsule oral    desipramine (Norpramin) 10 mg tablet oral    diclofenac sodium (Voltaren) 1 % gel gel 1 Application, Topical, 4 times daily PRN    ergocalciferol (Vitamin D-2) 1.25 MG (44659 UT) capsule 1 capsule, oral, Weekly, Every other week    esomeprazole (NexIUM) 20 mg DR capsule 1 capsule, oral, Daily before breakfast    flecainide (TAMBOCOR) 100 mg, oral, 2 times daily    Flowflex COVID-19 Ag Home Test kit FOLLOW INSTRUCTIONS INCLUDED WITH THE PACKAGE.    fluticasone-umeclidin-vilanter (Trelegy Ellipta) 200-62.5-25 mcg blister with device 1 puff, inhalation, Daily RT    fluticasone-umeclidin-vilanter (TRELEGY-ELLIPTA) 100-62.5-25 mcg blister with device inhalation    furosemide (LASIX) 40 mg, oral, 2 times daily, 20 tabs in morning and 1 at night, new instructions from      gabapentin (Neurontin) 300 mg capsule FOLLOW TITRATION SCHEDULE    gabapentin (Neurontin) 600 mg tablet     ipratropium-albuteroL (Duo-Neb) 0.5-2.5 mg/3 mL nebulizer solution 3 mL, inhalation, Every 4 hours RT    Klor-Con M20 20 mEq ER tablet 20 mEq, 2 times daily    lidocaine (Lidoderm) 5 % patch     linaCLOtide (Linzess) 145 mcg capsule 1 tablet, oral, Daily PRN    lisinopril 40 mg, oral, Daily    loratadine (Claritin) 10 mg tablet 1 tablet, oral, Daily    meclizine (ANTIVERT) 25 mg, oral, Every 12 hours PRN     methocarbamol (Robaxin) 500 mg tablet TAKE 1 TABLET BY MOUTH FOUR TIMES DAILY AS NEEDED FOR BACK SPASM    metoprolol tartrate (LOPRESSOR) 100 mg, oral, 2 times daily    montelukast (SINGULAIR) 10 mg, oral, Every 24 hours    omeprazole (PriLOSEC) 40 mg DR capsule 1 capsule, Every 24 hours    ondansetron (ZOFRAN) 4 mg, oral, Every 6 hours PRN    orphenadrine (Norflex) 100 mg 12 hr tablet TAKE 1 TABLET BY MOUTH TWICE A DAY AS NEEDED FOR MUSCLE SPASM    oxyCODONE (ROXICODONE) 5 mg, oral, Every 6 hours PRN    oxyCODONE-acetaminophen (Percocet) 5-325 mg tablet 1-2 tablets, oral, Every 4 hours PRN    pantoprazole (PROTONIX) 40 mg, oral, Daily RT    predniSONE (Deltasone) 20 mg tablet PLEASE SEE ATTACHED FOR DETAILED DIRECTIONS    predniSONE (DELTASONE) 10 mg, oral, Daily RT    topiramate (Topamax) 100 mg tablet oral    topiramate (Topamax) 25 mg tablet oral    traMADol (ULTRAM) 50 mg, oral    verapamil ER (VERALAN PM) 300 mg, oral, Nightly       Family History   Problem Relation Name Age of Onset    Diabetes Other      Hypertension Other      Stroke Other       Documented in chart and reviewed. No pertinent family history. No family history of any bleeding or clotting disorders.    Social History     Tobacco Use    Smoking status: Never    Smokeless tobacco: Never   Substance Use Topics    Alcohol use: Yes     Alcohol/week: 2.0 standard drinks of alcohol     Types: 2 Standard drinks or equivalent per week       Review of Systems: Review of systems completed with medical assistant intake. Please refer to this note.     Falls: The patient denies any recent falls or fall-related injuries.    Physical Exam:    BMI: Patient's BMI is 47.99 which is abnormal. Encouraged to lose weight and to follow up with PCP.    Constitutional: The patient is well appearing and well groomed.     Neurological/Psychiatric: The patient is alert and oriented to person, place and time. The patient has a normal mood and affect.    Skin  Examination: The skin over the right lower extremity, left lower extremity, right upper extremity, and left upper extremity is intact without any evidence of infection or rash.    Cardiovascular Examination: There are no varicosities and the skin is normal temperature, capillary refill normal, arterial pulses normal, no edema.    Lymphatic Examination: There is no lymphatic swelling or palpable lymph nodes present.    Neurological Examination: Bilateral lower extremities are grossly neurologically intact. Sensation normal, motor function normal, coordination / cerebellum normal, reflexes normal    Gait: The patient ambulates with an antalgic gait.     Right Hip Examination:  The skin is intact over the hip.    There is no tenderness over the greater trochanter.    Range of motion is full extension to 100 degrees of flexion.    The hip is stable without subluxation or dislocation.    The hip internally rotates to 15 degrees and externally rotates to 45 degrees.    There is no pain with hip motion.    Left Hip Examination:  The skin is intact over the hip.    There is no tenderness over the greater trochanter.    Range of motion is full extension to 100 degrees of flexion.    The hip is stable without subluxation or dislocation.    The hip internally rotates to 15 degrees and externally rotates to 45 degrees.    There is no pain with hip motion.    Left Knee Examination:  Examination of the left knee reveals the skin to be intact.    There is a small effusion in the knee.    The alignment of the knee is Varus.    This deformity is correctable.    There is tenderness to palpation over the joint line.    There is significant quadriceps atrophy.    Range of Motion: full extension to 110 degrees of flexion.    The knee is stable to varus-valgus stress and anterior-posterior stress.     There is moderate grinding with range of motion.    There is moderate patellofemoral crepitus.    Right Knee Examination:  Examination of  the right knee reveals the skin to be intact.     There is no obvious swelling.    There is a no effusion in the knee.     The alignment of the knee is normal.    There is no tenderness to palpation over the joint line.    There is no significant quadriceps atrophy.    Range of motion is full extension to 120 degrees of flexion.    The knee is stable to varus-valgus stress and anterior-posterior stress.     There is moderate grinding with range of motion.    There is moderate patellofemoral crepitus.    Radiographs:  Radiographs were personally reviewed today. There is evidence of severe LEFT knee osteoarthritis with MEDIAL bone on bone apposition.    Impression:  53 y.o. female presents with severe LEFT knee osteoarthritis with bone on bone apposition. Patient has tried and failed appropriate conservative measures and now has limitation in ADL's.     Diagnosis:  Arthritis of left knee    Left knee pain, unspecified chronicity    Recommendations / Plan:    I have discussed the options in detail with the patient. We have discussed anti-inflammatory medication, activity modification, physical therapy, corticosteroid injections, viscosupplementation injections, and total knee replacement surgery.    The patient is hoping to have a left total knee replacement.  She has end-stage osteoarthritis.  At this point, the patient's BMI is 48 which is too high for surgery.  Discussed a goal weight of 250 pounds prior to surgery.  Discussed strategies for weight loss. The most effective of these options is weight loss mainly through restricting caloric intake.  A referral to weight management was provided. Discussed that obesity is a risk factor for continued progression of osteoarthritis. Each pound of weight loss offloads her hip and knee joints by 3-6 pounds.  Encouraged them to use alternating Ibuprofen and Tylenol scheduled for the next week to address the pain.  Encouraged them to maintain range of motion and strength  around the knee joints.  They will continue to implement these strategies in addressing her pain.  They will follow up on an as-needed basis.    _____________  Paula Lai MD   [facility]     Approximately 45 minutes were spent on the following tasks:              Preparing for the patient              Reviewing medical records              Taking a patient history              Performing a physical exam              Reviewing treatment options with the patient              Explaining the risks, potential benefits, and alternative to surgery  Explaining the expected rehabilitation after each treatment option  Explaining the potential long term expectations  Evaluating the diagnostic imaging      This office note was transcribed with dictation software.  Please excuse any typographical errors, program misunderstandings leading to inadvertent insertions or deletions of inappropriate wording, pronoun errors and other unintentional transcription errors not noticed on proof-reading.

## 2023-11-14 ENCOUNTER — LAB (OUTPATIENT)
Dept: LAB | Facility: LAB | Age: 53
End: 2023-11-14
Payer: COMMERCIAL

## 2023-11-14 DIAGNOSIS — I50.33 ACUTE ON CHRONIC DIASTOLIC (CONGESTIVE) HEART FAILURE (MULTI): ICD-10-CM

## 2023-11-14 DIAGNOSIS — I50.33 ACUTE ON CHRONIC DIASTOLIC (CONGESTIVE) HEART FAILURE (MULTI): Primary | ICD-10-CM

## 2023-11-14 LAB
ANION GAP SERPL CALC-SCNC: 13 MMOL/L (ref 10–20)
BUN SERPL-MCNC: 17 MG/DL (ref 6–23)
CALCIUM SERPL-MCNC: 8.9 MG/DL (ref 8.6–10.3)
CHLORIDE SERPL-SCNC: 103 MMOL/L (ref 98–107)
CO2 SERPL-SCNC: 30 MMOL/L (ref 21–32)
CREAT SERPL-MCNC: 0.89 MG/DL (ref 0.5–1.05)
GFR SERPL CREATININE-BSD FRML MDRD: 79 ML/MIN/1.73M*2
GLUCOSE SERPL-MCNC: 97 MG/DL (ref 74–99)
POTASSIUM SERPL-SCNC: 3.9 MMOL/L (ref 3.5–5.3)
SODIUM SERPL-SCNC: 142 MMOL/L (ref 136–145)

## 2023-11-14 PROCEDURE — 80048 BASIC METABOLIC PNL TOTAL CA: CPT

## 2023-11-14 PROCEDURE — 36415 COLL VENOUS BLD VENIPUNCTURE: CPT

## 2023-11-28 ENCOUNTER — HOSPITAL ENCOUNTER (OUTPATIENT)
Facility: HOSPITAL | Age: 53
Setting detail: OBSERVATION
Discharge: HOME | End: 2023-11-29
Attending: EMERGENCY MEDICINE | Admitting: EMERGENCY MEDICINE
Payer: COMMERCIAL

## 2023-11-28 ENCOUNTER — APPOINTMENT (OUTPATIENT)
Dept: RADIOLOGY | Facility: HOSPITAL | Age: 53
End: 2023-11-28
Payer: COMMERCIAL

## 2023-11-28 ENCOUNTER — APPOINTMENT (OUTPATIENT)
Dept: CARDIOLOGY | Facility: HOSPITAL | Age: 53
End: 2023-11-28
Payer: COMMERCIAL

## 2023-11-28 DIAGNOSIS — R07.89 ATYPICAL CHEST PAIN: Primary | ICD-10-CM

## 2023-11-28 DIAGNOSIS — I50.32 CHRONIC DIASTOLIC HEART FAILURE (MULTI): ICD-10-CM

## 2023-11-28 PROBLEM — R07.9 CHEST PAIN OF UNCERTAIN ETIOLOGY: Status: ACTIVE | Noted: 2023-11-28

## 2023-11-28 LAB
ALBUMIN SERPL BCP-MCNC: 4 G/DL (ref 3.4–5)
ALP SERPL-CCNC: 54 U/L (ref 33–110)
ALT SERPL W P-5'-P-CCNC: 13 U/L (ref 7–45)
ANION GAP SERPL CALC-SCNC: 14 MMOL/L (ref 10–20)
AST SERPL W P-5'-P-CCNC: 12 U/L (ref 9–39)
ATRIAL RATE: 74 BPM
BASOPHILS # BLD AUTO: 0.02 X10*3/UL (ref 0–0.1)
BASOPHILS NFR BLD AUTO: 0.3 %
BILIRUB SERPL-MCNC: 0.5 MG/DL (ref 0–1.2)
BUN SERPL-MCNC: 34 MG/DL (ref 6–23)
CALCIUM SERPL-MCNC: 9.3 MG/DL (ref 8.6–10.3)
CARDIAC TROPONIN I PNL SERPL HS: 3 NG/L (ref 0–13)
CARDIAC TROPONIN I PNL SERPL HS: 3 NG/L (ref 0–13)
CHLORIDE SERPL-SCNC: 101 MMOL/L (ref 98–107)
CO2 SERPL-SCNC: 29 MMOL/L (ref 21–32)
CREAT SERPL-MCNC: 1.09 MG/DL (ref 0.5–1.05)
D DIMER PPP FEU-MCNC: 0.69 MG/L FEU (ref 0.19–0.5)
EOSINOPHIL # BLD AUTO: 0.16 X10*3/UL (ref 0–0.7)
EOSINOPHIL NFR BLD AUTO: 2.7 %
ERYTHROCYTE [DISTWIDTH] IN BLOOD BY AUTOMATED COUNT: 14.4 % (ref 11.5–14.5)
GFR SERPL CREATININE-BSD FRML MDRD: 62 ML/MIN/1.73M*2
GLUCOSE SERPL-MCNC: 131 MG/DL (ref 74–99)
HCT VFR BLD AUTO: 38.6 % (ref 36–46)
HGB BLD-MCNC: 12.2 G/DL (ref 12–16)
HOLD SPECIMEN: NORMAL
IMM GRANULOCYTES # BLD AUTO: 0.01 X10*3/UL (ref 0–0.7)
IMM GRANULOCYTES NFR BLD AUTO: 0.2 % (ref 0–0.9)
LYMPHOCYTES # BLD AUTO: 1.97 X10*3/UL (ref 1.2–4.8)
LYMPHOCYTES NFR BLD AUTO: 33.5 %
MAGNESIUM SERPL-MCNC: 2.09 MG/DL (ref 1.6–2.4)
MCH RBC QN AUTO: 28.9 PG (ref 26–34)
MCHC RBC AUTO-ENTMCNC: 31.6 G/DL (ref 32–36)
MCV RBC AUTO: 92 FL (ref 80–100)
MONOCYTES # BLD AUTO: 0.65 X10*3/UL (ref 0.1–1)
MONOCYTES NFR BLD AUTO: 11.1 %
NEUTROPHILS # BLD AUTO: 3.07 X10*3/UL (ref 1.2–7.7)
NEUTROPHILS NFR BLD AUTO: 52.2 %
NRBC BLD-RTO: ABNORMAL /100{WBCS}
P AXIS: 22 DEGREES
P OFFSET: 178 MS
P ONSET: 127 MS
PLATELET # BLD AUTO: 292 X10*3/UL (ref 150–450)
POTASSIUM SERPL-SCNC: 3.5 MMOL/L (ref 3.5–5.3)
PR INTERVAL: 188 MS
PROT SERPL-MCNC: 7.1 G/DL (ref 6.4–8.2)
Q ONSET: 221 MS
QRS COUNT: 12 BEATS
QRS DURATION: 92 MS
QT INTERVAL: 398 MS
QTC CALCULATION(BAZETT): 441 MS
QTC FREDERICIA: 427 MS
R AXIS: -21 DEGREES
RBC # BLD AUTO: 4.22 X10*6/UL (ref 4–5.2)
SODIUM SERPL-SCNC: 140 MMOL/L (ref 136–145)
T AXIS: 1 DEGREES
T OFFSET: 420 MS
VENTRICULAR RATE: 74 BPM
WBC # BLD AUTO: 5.9 X10*3/UL (ref 4.4–11.3)

## 2023-11-28 PROCEDURE — 96360 HYDRATION IV INFUSION INIT: CPT

## 2023-11-28 PROCEDURE — 2500000004 HC RX 250 GENERAL PHARMACY W/ HCPCS (ALT 636 FOR OP/ED): Performed by: EMERGENCY MEDICINE

## 2023-11-28 PROCEDURE — 85025 COMPLETE CBC W/AUTO DIFF WBC: CPT | Performed by: EMERGENCY MEDICINE

## 2023-11-28 PROCEDURE — 96361 HYDRATE IV INFUSION ADD-ON: CPT

## 2023-11-28 PROCEDURE — 2500000001 HC RX 250 WO HCPCS SELF ADMINISTERED DRUGS (ALT 637 FOR MEDICARE OP): Performed by: EMERGENCY MEDICINE

## 2023-11-28 PROCEDURE — G0378 HOSPITAL OBSERVATION PER HR: HCPCS

## 2023-11-28 PROCEDURE — 36415 COLL VENOUS BLD VENIPUNCTURE: CPT | Performed by: EMERGENCY MEDICINE

## 2023-11-28 PROCEDURE — 71046 X-RAY EXAM CHEST 2 VIEWS: CPT | Mod: FY

## 2023-11-28 PROCEDURE — 83735 ASSAY OF MAGNESIUM: CPT | Performed by: PHYSICIAN ASSISTANT

## 2023-11-28 PROCEDURE — 85300 ANTITHROMBIN III ACTIVITY: CPT | Performed by: EMERGENCY MEDICINE

## 2023-11-28 PROCEDURE — 2500000001 HC RX 250 WO HCPCS SELF ADMINISTERED DRUGS (ALT 637 FOR MEDICARE OP): Performed by: STUDENT IN AN ORGANIZED HEALTH CARE EDUCATION/TRAINING PROGRAM

## 2023-11-28 PROCEDURE — 93005 ELECTROCARDIOGRAM TRACING: CPT

## 2023-11-28 PROCEDURE — 84484 ASSAY OF TROPONIN QUANT: CPT | Performed by: EMERGENCY MEDICINE

## 2023-11-28 PROCEDURE — 94762 N-INVAS EAR/PLS OXIMTRY CONT: CPT

## 2023-11-28 PROCEDURE — 80053 COMPREHEN METABOLIC PANEL: CPT | Performed by: EMERGENCY MEDICINE

## 2023-11-28 PROCEDURE — 99285 EMERGENCY DEPT VISIT HI MDM: CPT | Mod: 25 | Performed by: EMERGENCY MEDICINE

## 2023-11-28 PROCEDURE — 93970 EXTREMITY STUDY: CPT

## 2023-11-28 RX ORDER — SODIUM CHLORIDE, SODIUM LACTATE, POTASSIUM CHLORIDE, CALCIUM CHLORIDE 600; 310; 30; 20 MG/100ML; MG/100ML; MG/100ML; MG/100ML
50 INJECTION, SOLUTION INTRAVENOUS CONTINUOUS
Status: DISCONTINUED | OUTPATIENT
Start: 2023-11-28 | End: 2023-11-29 | Stop reason: HOSPADM

## 2023-11-28 RX ORDER — FLUTICASONE FUROATE AND VILANTEROL 100; 25 UG/1; UG/1
1 POWDER RESPIRATORY (INHALATION)
Status: DISCONTINUED | OUTPATIENT
Start: 2023-11-28 | End: 2023-11-28

## 2023-11-28 RX ORDER — LISINOPRIL 20 MG/1
40 TABLET ORAL DAILY
Status: DISCONTINUED | OUTPATIENT
Start: 2023-11-29 | End: 2023-11-29 | Stop reason: HOSPADM

## 2023-11-28 RX ORDER — DESIPRAMINE HYDROCHLORIDE 10 MG/1
25 TABLET ORAL NIGHTLY
Status: DISCONTINUED | OUTPATIENT
Start: 2023-11-28 | End: 2023-11-28

## 2023-11-28 RX ORDER — AZELASTINE 1 MG/ML
1 SPRAY, METERED NASAL EVERY 12 HOURS PRN
Status: DISCONTINUED | OUTPATIENT
Start: 2023-11-28 | End: 2023-11-28

## 2023-11-28 RX ORDER — MECLIZINE HCL 25MG 25 MG/1
25 TABLET, CHEWABLE ORAL EVERY 12 HOURS PRN
Status: DISCONTINUED | OUTPATIENT
Start: 2023-11-28 | End: 2023-11-29 | Stop reason: HOSPADM

## 2023-11-28 RX ORDER — ACETAMINOPHEN 325 MG/1
975 TABLET ORAL EVERY 8 HOURS
Status: DISCONTINUED | OUTPATIENT
Start: 2023-11-28 | End: 2023-11-28

## 2023-11-28 RX ORDER — TALC
3 POWDER (GRAM) TOPICAL DAILY PRN
Status: DISCONTINUED | OUTPATIENT
Start: 2023-11-28 | End: 2023-11-29 | Stop reason: HOSPADM

## 2023-11-28 RX ORDER — IPRATROPIUM BROMIDE AND ALBUTEROL SULFATE 2.5; .5 MG/3ML; MG/3ML
3 SOLUTION RESPIRATORY (INHALATION) EVERY 4 HOURS PRN
Status: DISCONTINUED | OUTPATIENT
Start: 2023-11-28 | End: 2023-11-29 | Stop reason: HOSPADM

## 2023-11-28 RX ORDER — SODIUM CHLORIDE, SODIUM LACTATE, POTASSIUM CHLORIDE, CALCIUM CHLORIDE 600; 310; 30; 20 MG/100ML; MG/100ML; MG/100ML; MG/100ML
100 INJECTION, SOLUTION INTRAVENOUS CONTINUOUS
Status: DISCONTINUED | OUTPATIENT
Start: 2023-11-28 | End: 2023-11-28

## 2023-11-28 RX ORDER — POTASSIUM CHLORIDE 20 MEQ/1
20 TABLET, EXTENDED RELEASE ORAL 2 TIMES DAILY
Status: DISCONTINUED | OUTPATIENT
Start: 2023-11-28 | End: 2023-11-29

## 2023-11-28 RX ORDER — METHOCARBAMOL 500 MG/1
500 TABLET, FILM COATED ORAL EVERY 8 HOURS PRN
Status: DISCONTINUED | OUTPATIENT
Start: 2023-11-28 | End: 2023-11-29 | Stop reason: HOSPADM

## 2023-11-28 RX ORDER — ONDANSETRON 4 MG/1
4 TABLET, ORALLY DISINTEGRATING ORAL EVERY 6 HOURS PRN
Status: DISCONTINUED | OUTPATIENT
Start: 2023-11-28 | End: 2023-11-29 | Stop reason: HOSPADM

## 2023-11-28 RX ORDER — FLECAINIDE ACETATE 50 MG/1
100 TABLET ORAL 2 TIMES DAILY
Status: DISCONTINUED | OUTPATIENT
Start: 2023-11-28 | End: 2023-11-29 | Stop reason: HOSPADM

## 2023-11-28 RX ORDER — LIDOCAINE 560 MG/1
1 PATCH PERCUTANEOUS; TOPICAL; TRANSDERMAL DAILY
Status: DISCONTINUED | OUTPATIENT
Start: 2023-11-28 | End: 2023-11-29 | Stop reason: HOSPADM

## 2023-11-28 RX ORDER — POLYETHYLENE GLYCOL 3350 17 G/17G
17 POWDER, FOR SOLUTION ORAL DAILY
Status: DISCONTINUED | OUTPATIENT
Start: 2023-11-28 | End: 2023-11-29 | Stop reason: HOSPADM

## 2023-11-28 RX ORDER — PANTOPRAZOLE SODIUM 40 MG/1
40 TABLET, DELAYED RELEASE ORAL
Status: DISCONTINUED | OUTPATIENT
Start: 2023-11-29 | End: 2023-11-29 | Stop reason: HOSPADM

## 2023-11-28 RX ORDER — ATORVASTATIN CALCIUM 40 MG/1
40 TABLET, FILM COATED ORAL DAILY
Status: DISCONTINUED | OUTPATIENT
Start: 2023-11-29 | End: 2023-11-29 | Stop reason: HOSPADM

## 2023-11-28 RX ORDER — ACETAMINOPHEN 325 MG/1
650 TABLET ORAL EVERY 6 HOURS PRN
Status: DISCONTINUED | OUTPATIENT
Start: 2023-11-28 | End: 2023-11-29 | Stop reason: HOSPADM

## 2023-11-28 RX ORDER — IPRATROPIUM BROMIDE AND ALBUTEROL SULFATE 2.5; .5 MG/3ML; MG/3ML
3 SOLUTION RESPIRATORY (INHALATION)
Status: DISCONTINUED | OUTPATIENT
Start: 2023-11-28 | End: 2023-11-28

## 2023-11-28 RX ORDER — PREDNISONE 10 MG/1
10 TABLET ORAL
Status: DISCONTINUED | OUTPATIENT
Start: 2023-11-29 | End: 2023-11-29 | Stop reason: HOSPADM

## 2023-11-28 RX ORDER — TRAMADOL HYDROCHLORIDE 50 MG/1
50 TABLET ORAL EVERY 6 HOURS PRN
Status: DISCONTINUED | OUTPATIENT
Start: 2023-11-28 | End: 2023-11-29 | Stop reason: HOSPADM

## 2023-11-28 RX ORDER — ALUMINUM HYDROXIDE, MAGNESIUM HYDROXIDE, AND SIMETHICONE 1200; 120; 1200 MG/30ML; MG/30ML; MG/30ML
20 SUSPENSION ORAL EVERY 4 HOURS PRN
Status: DISCONTINUED | OUTPATIENT
Start: 2023-11-28 | End: 2023-11-29 | Stop reason: HOSPADM

## 2023-11-28 RX ORDER — GABAPENTIN 300 MG/1
300 CAPSULE ORAL EVERY 8 HOURS SCHEDULED
Status: DISCONTINUED | OUTPATIENT
Start: 2023-11-28 | End: 2023-11-28

## 2023-11-28 RX ORDER — TORSEMIDE 20 MG/1
40 TABLET ORAL 2 TIMES DAILY
Status: ON HOLD | COMMUNITY
End: 2023-11-29 | Stop reason: SDUPTHER

## 2023-11-28 RX ORDER — NAPROXEN SODIUM 220 MG/1
324 TABLET, FILM COATED ORAL ONCE
Status: COMPLETED | OUTPATIENT
Start: 2023-11-28 | End: 2023-11-28

## 2023-11-28 RX ORDER — SIMETHICONE 80 MG
80 TABLET,CHEWABLE ORAL 4 TIMES DAILY PRN
Status: DISCONTINUED | OUTPATIENT
Start: 2023-11-28 | End: 2023-11-29 | Stop reason: HOSPADM

## 2023-11-28 RX ORDER — GABAPENTIN 300 MG/1
300 CAPSULE ORAL EVERY 8 HOURS PRN
Status: DISCONTINUED | OUTPATIENT
Start: 2023-11-28 | End: 2023-11-29 | Stop reason: HOSPADM

## 2023-11-28 RX ORDER — ALBUTEROL SULFATE 0.83 MG/ML
2.5 SOLUTION RESPIRATORY (INHALATION) EVERY 4 HOURS PRN
Status: DISCONTINUED | OUTPATIENT
Start: 2023-11-28 | End: 2023-11-29 | Stop reason: HOSPADM

## 2023-11-28 RX ORDER — MONTELUKAST SODIUM 10 MG/1
10 TABLET ORAL EVERY 24 HOURS
Status: DISCONTINUED | OUTPATIENT
Start: 2023-11-29 | End: 2023-11-29 | Stop reason: HOSPADM

## 2023-11-28 RX ORDER — PANTOPRAZOLE SODIUM 20 MG/1
20 TABLET, DELAYED RELEASE ORAL
Status: DISCONTINUED | OUTPATIENT
Start: 2023-11-29 | End: 2023-11-28

## 2023-11-28 RX ORDER — TOPIRAMATE 25 MG/1
25 TABLET ORAL DAILY
Status: DISCONTINUED | OUTPATIENT
Start: 2023-11-29 | End: 2023-11-29 | Stop reason: HOSPADM

## 2023-11-28 RX ORDER — METOPROLOL TARTRATE 50 MG/1
100 TABLET ORAL 2 TIMES DAILY
Status: DISCONTINUED | OUTPATIENT
Start: 2023-11-28 | End: 2023-11-29 | Stop reason: HOSPADM

## 2023-11-28 RX ORDER — LORATADINE 10 MG/1
10 TABLET ORAL DAILY
Status: DISCONTINUED | OUTPATIENT
Start: 2023-11-29 | End: 2023-11-29 | Stop reason: HOSPADM

## 2023-11-28 RX ORDER — DICLOFENAC SODIUM 10 MG/G
4 GEL TOPICAL 4 TIMES DAILY PRN
Status: DISCONTINUED | OUTPATIENT
Start: 2023-11-28 | End: 2023-11-29 | Stop reason: HOSPADM

## 2023-11-28 RX ORDER — ALBUTEROL SULFATE 90 UG/1
2 AEROSOL, METERED RESPIRATORY (INHALATION) EVERY 4 HOURS PRN
Status: DISCONTINUED | OUTPATIENT
Start: 2023-11-28 | End: 2023-11-28

## 2023-11-28 RX ORDER — ALBUTEROL SULFATE 0.83 MG/ML
2.5 SOLUTION RESPIRATORY (INHALATION) EVERY 6 HOURS PRN
Status: DISCONTINUED | OUTPATIENT
Start: 2023-11-28 | End: 2023-11-28

## 2023-11-28 RX ORDER — METOPROLOL TARTRATE 50 MG/1
50 TABLET ORAL ONCE
Status: COMPLETED | OUTPATIENT
Start: 2023-11-28 | End: 2023-11-28

## 2023-11-28 RX ADMIN — SODIUM CHLORIDE, SODIUM LACTATE, POTASSIUM CHLORIDE, AND CALCIUM CHLORIDE 50 ML/HR: 600; 310; 30; 20 INJECTION, SOLUTION INTRAVENOUS at 17:41

## 2023-11-28 RX ADMIN — APIXABAN 5 MG: 5 TABLET, FILM COATED ORAL at 20:39

## 2023-11-28 RX ADMIN — METOPROLOL TARTRATE 50 MG: 50 TABLET ORAL at 20:39

## 2023-11-28 RX ADMIN — FLECAINIDE ACETATE 100 MG: 50 TABLET ORAL at 20:39

## 2023-11-28 RX ADMIN — POTASSIUM CHLORIDE 20 MEQ: 1500 TABLET, EXTENDED RELEASE ORAL at 20:39

## 2023-11-28 RX ADMIN — ASPIRIN 324 MG: 81 TABLET, CHEWABLE ORAL at 08:11

## 2023-11-28 RX ADMIN — SODIUM CHLORIDE 500 ML: 900 INJECTION, SOLUTION INTRAVENOUS at 08:11

## 2023-11-28 SDOH — SOCIAL STABILITY: SOCIAL NETWORK
DO YOU BELONG TO ANY CLUBS OR ORGANIZATIONS SUCH AS CHURCH GROUPS UNIONS, FRATERNAL OR ATHLETIC GROUPS, OR SCHOOL GROUPS?: NO

## 2023-11-28 SDOH — ECONOMIC STABILITY: FOOD INSECURITY: WITHIN THE PAST 12 MONTHS, THE FOOD YOU BOUGHT JUST DIDN'T LAST AND YOU DIDN'T HAVE MONEY TO GET MORE.: NEVER TRUE

## 2023-11-28 SDOH — SOCIAL STABILITY: SOCIAL INSECURITY: ARE YOU OR HAVE YOU BEEN THREATENED OR ABUSED PHYSICALLY, EMOTIONALLY, OR SEXUALLY BY ANYONE?: NO

## 2023-11-28 SDOH — SOCIAL STABILITY: SOCIAL INSECURITY
WITHIN THE LAST YEAR, HAVE TO BEEN RAPED OR FORCED TO HAVE ANY KIND OF SEXUAL ACTIVITY BY YOUR PARTNER OR EX-PARTNER?: NO

## 2023-11-28 SDOH — ECONOMIC STABILITY: HOUSING INSECURITY: DO YOU FEEL UNSAFE GOING BACK TO THE PLACE WHERE YOU LIVE?: NO

## 2023-11-28 SDOH — ECONOMIC STABILITY: TRANSPORTATION INSECURITY
IN THE PAST 12 MONTHS, HAS THE LACK OF TRANSPORTATION KEPT YOU FROM MEDICAL APPOINTMENTS OR FROM GETTING MEDICATIONS?: NO

## 2023-11-28 SDOH — ECONOMIC STABILITY: INCOME INSECURITY: HOW HARD IS IT FOR YOU TO PAY FOR THE VERY BASICS LIKE FOOD, HOUSING, MEDICAL CARE, AND HEATING?: NOT HARD AT ALL

## 2023-11-28 SDOH — SOCIAL STABILITY: SOCIAL NETWORK: ARE YOU MARRIED, WIDOWED, DIVORCED, SEPARATED, NEVER MARRIED, OR LIVING WITH A PARTNER?: SEPARATED

## 2023-11-28 SDOH — ECONOMIC STABILITY: HOUSING INSECURITY
IN THE LAST 12 MONTHS, WAS THERE A TIME WHEN YOU DID NOT HAVE A STEADY PLACE TO SLEEP OR SLEPT IN A SHELTER (INCLUDING NOW)?: NO

## 2023-11-28 SDOH — HEALTH STABILITY: PHYSICAL HEALTH: ON AVERAGE, HOW MANY MINUTES DO YOU ENGAGE IN EXERCISE AT THIS LEVEL?: 0 MIN

## 2023-11-28 SDOH — SOCIAL STABILITY: SOCIAL INSECURITY: ABUSE: ADULT

## 2023-11-28 SDOH — ECONOMIC STABILITY: FOOD INSECURITY: WITHIN THE PAST 12 MONTHS, YOU WORRIED THAT YOUR FOOD WOULD RUN OUT BEFORE YOU GOT MONEY TO BUY MORE.: NEVER TRUE

## 2023-11-28 SDOH — ECONOMIC STABILITY: INCOME INSECURITY: IN THE LAST 12 MONTHS, WAS THERE A TIME WHEN YOU WERE NOT ABLE TO PAY THE MORTGAGE OR RENT ON TIME?: NO

## 2023-11-28 SDOH — HEALTH STABILITY: MENTAL HEALTH: HOW OFTEN DO YOU HAVE 6 OR MORE DRINKS ON ONE OCCASION?: NEVER

## 2023-11-28 SDOH — SOCIAL STABILITY: SOCIAL INSECURITY: WITHIN THE LAST YEAR, HAVE YOU BEEN AFRAID OF YOUR PARTNER OR EX-PARTNER?: NO

## 2023-11-28 SDOH — SOCIAL STABILITY: SOCIAL INSECURITY: HAVE YOU HAD ANY THOUGHTS OF HARMING ANYONE ELSE?: NO

## 2023-11-28 SDOH — ECONOMIC STABILITY: HOUSING INSECURITY: IN THE LAST 12 MONTHS, HOW MANY PLACES HAVE YOU LIVED?: 1

## 2023-11-28 SDOH — SOCIAL STABILITY: SOCIAL INSECURITY
ASK PARENT OR GUARDIAN: ARE THERE TIMES WHEN YOU, YOUR CHILD(REN), OR ANY MEMBER OF YOUR HOUSEHOLD FEEL UNSAFE, HARMED, OR THREATENED AROUND PERSONS WITH WHOM YOU KNOW OR LIVE?: NO

## 2023-11-28 SDOH — ECONOMIC STABILITY: INCOME INSECURITY: IN THE PAST 12 MONTHS, HAS THE ELECTRIC, GAS, OIL, OR WATER COMPANY THREATENED TO SHUT OFF SERVICE IN YOUR HOME?: NO

## 2023-11-28 SDOH — SOCIAL STABILITY: SOCIAL INSECURITY
WITHIN THE LAST YEAR, HAVE YOU BEEN KICKED, HIT, SLAPPED, OR OTHERWISE PHYSICALLY HURT BY YOUR PARTNER OR EX-PARTNER?: NO

## 2023-11-28 SDOH — ECONOMIC STABILITY: TRANSPORTATION INSECURITY
IN THE PAST 12 MONTHS, HAS LACK OF TRANSPORTATION KEPT YOU FROM MEETINGS, WORK, OR FROM GETTING THINGS NEEDED FOR DAILY LIVING?: NO

## 2023-11-28 SDOH — SOCIAL STABILITY: SOCIAL NETWORK: HOW OFTEN DO YOU GET TOGETHER WITH FRIENDS OR RELATIVES?: MORE THAN THREE TIMES A WEEK

## 2023-11-28 SDOH — SOCIAL STABILITY: SOCIAL INSECURITY: WITHIN THE LAST YEAR, HAVE YOU BEEN HUMILIATED OR EMOTIONALLY ABUSED IN OTHER WAYS BY YOUR PARTNER OR EX-PARTNER?: NO

## 2023-11-28 SDOH — SOCIAL STABILITY: SOCIAL NETWORK: HOW OFTEN DO YOU ATTEND CHURCH OR RELIGIOUS SERVICES?: NEVER

## 2023-11-28 SDOH — SOCIAL STABILITY: SOCIAL INSECURITY: HAVE YOU HAD THOUGHTS OF HARMING ANYONE ELSE?: NO

## 2023-11-28 SDOH — HEALTH STABILITY: MENTAL HEALTH: HOW OFTEN DO YOU HAVE A DRINK CONTAINING ALCOHOL?: MONTHLY OR LESS

## 2023-11-28 SDOH — HEALTH STABILITY: MENTAL HEALTH: HOW MANY STANDARD DRINKS CONTAINING ALCOHOL DO YOU HAVE ON A TYPICAL DAY?: 1 OR 2

## 2023-11-28 SDOH — SOCIAL STABILITY: SOCIAL INSECURITY: DO YOU FEEL UNSAFE GOING BACK TO THE PLACE WHERE YOU ARE LIVING?: NO

## 2023-11-28 SDOH — SOCIAL STABILITY: SOCIAL INSECURITY: WERE YOU ABLE TO COMPLETE ALL THE BEHAVIORAL HEALTH SCREENINGS?: YES

## 2023-11-28 SDOH — SOCIAL STABILITY: SOCIAL INSECURITY: DOES ANYONE TRY TO KEEP YOU FROM HAVING/CONTACTING OTHER FRIENDS OR DOING THINGS OUTSIDE YOUR HOME?: NO

## 2023-11-28 SDOH — SOCIAL STABILITY: SOCIAL INSECURITY: DO YOU FEEL ANYONE HAS EXPLOITED OR TAKEN ADVANTAGE OF YOU FINANCIALLY OR OF YOUR PERSONAL PROPERTY?: NO

## 2023-11-28 SDOH — HEALTH STABILITY: MENTAL HEALTH
STRESS IS WHEN SOMEONE FEELS TENSE, NERVOUS, ANXIOUS, OR CAN'T SLEEP AT NIGHT BECAUSE THEIR MIND IS TROUBLED. HOW STRESSED ARE YOU?: NOT AT ALL

## 2023-11-28 SDOH — SOCIAL STABILITY: SOCIAL NETWORK
IN A TYPICAL WEEK, HOW MANY TIMES DO YOU TALK ON THE PHONE WITH FAMILY, FRIENDS, OR NEIGHBORS?: MORE THAN THREE TIMES A WEEK

## 2023-11-28 SDOH — HEALTH STABILITY: PHYSICAL HEALTH: ON AVERAGE, HOW MANY DAYS PER WEEK DO YOU ENGAGE IN MODERATE TO STRENUOUS EXERCISE (LIKE A BRISK WALK)?: 0 DAYS

## 2023-11-28 SDOH — SOCIAL STABILITY: SOCIAL INSECURITY: ARE THERE ANY APPARENT SIGNS OF INJURIES/BEHAVIORS THAT COULD BE RELATED TO ABUSE/NEGLECT?: NO

## 2023-11-28 SDOH — SOCIAL STABILITY: SOCIAL INSECURITY: HAS ANYONE EVER THREATENED TO HURT YOUR FAMILY OR YOUR PETS?: NO

## 2023-11-28 SDOH — SOCIAL STABILITY: SOCIAL NETWORK: HOW OFTEN DO YOU ATTENT MEETINGS OF THE CLUB OR ORGANIZATION YOU BELONG TO?: NEVER

## 2023-11-28 ASSESSMENT — COGNITIVE AND FUNCTIONAL STATUS - GENERAL
DAILY ACTIVITIY SCORE: 24
PATIENT BASELINE BEDBOUND: NO
DAILY ACTIVITIY SCORE: 24
MOBILITY SCORE: 24
MOBILITY SCORE: 24
PATIENT BASELINE BEDBOUND: NO
DAILY ACTIVITIY SCORE: 24

## 2023-11-28 ASSESSMENT — PAIN SCALES - GENERAL
PAINLEVEL_OUTOF10: 0 - NO PAIN
PAINLEVEL_OUTOF10: 5 - MODERATE PAIN
PAINLEVEL_OUTOF10: 0 - NO PAIN
PAINLEVEL_OUTOF10: 5 - MODERATE PAIN

## 2023-11-28 ASSESSMENT — PAIN DESCRIPTION - DESCRIPTORS
DESCRIPTORS: ACHING
DESCRIPTORS: ACHING

## 2023-11-28 ASSESSMENT — PAIN - FUNCTIONAL ASSESSMENT
PAIN_FUNCTIONAL_ASSESSMENT: 0-10

## 2023-11-28 ASSESSMENT — COLUMBIA-SUICIDE SEVERITY RATING SCALE - C-SSRS
1. IN THE PAST MONTH, HAVE YOU WISHED YOU WERE DEAD OR WISHED YOU COULD GO TO SLEEP AND NOT WAKE UP?: NO
2. HAVE YOU ACTUALLY HAD ANY THOUGHTS OF KILLING YOURSELF?: NO
6. HAVE YOU EVER DONE ANYTHING, STARTED TO DO ANYTHING, OR PREPARED TO DO ANYTHING TO END YOUR LIFE?: NO

## 2023-11-28 ASSESSMENT — LIFESTYLE VARIABLES
AUDIT-C TOTAL SCORE: 1
SKIP TO QUESTIONS 9-10: 1
SKIP TO QUESTIONS 9-10: 1
HOW OFTEN DO YOU HAVE 6 OR MORE DRINKS ON ONE OCCASION: NEVER
AUDIT-C TOTAL SCORE: 1
AUDIT-C TOTAL SCORE: 1
HOW OFTEN DO YOU HAVE A DRINK CONTAINING ALCOHOL: MONTHLY OR LESS
HOW MANY STANDARD DRINKS CONTAINING ALCOHOL DO YOU HAVE ON A TYPICAL DAY: 1 OR 2

## 2023-11-28 ASSESSMENT — PATIENT HEALTH QUESTIONNAIRE - PHQ9
2. FEELING DOWN, DEPRESSED OR HOPELESS: NOT AT ALL
SUM OF ALL RESPONSES TO PHQ9 QUESTIONS 1 & 2: 0
1. LITTLE INTEREST OR PLEASURE IN DOING THINGS: NOT AT ALL

## 2023-11-28 ASSESSMENT — ACTIVITIES OF DAILY LIVING (ADL)
DRESSING YOURSELF: INDEPENDENT
HEARING - LEFT EAR: FUNCTIONAL
TOILETING: INDEPENDENT
ADEQUATE_TO_COMPLETE_ADL: YES
GROOMING: INDEPENDENT
BATHING: INDEPENDENT
PATIENT'S MEMORY ADEQUATE TO SAFELY COMPLETE DAILY ACTIVITIES?: YES
HEARING - RIGHT EAR: FUNCTIONAL
FEEDING YOURSELF: INDEPENDENT
WALKS IN HOME: INDEPENDENT
JUDGMENT_ADEQUATE_SAFELY_COMPLETE_DAILY_ACTIVITIES: YES
LACK_OF_TRANSPORTATION: NO

## 2023-11-28 ASSESSMENT — PAIN DESCRIPTION - LOCATION: LOCATION: CHEST

## 2023-11-28 NOTE — CARE PLAN
The patient's goals for the shift include  pain free    The clinical goals for the shift include cruz free

## 2023-11-28 NOTE — H&P
"  History and Physical    Betzaida Jc is a 53 y.o. female on day 0 of admission presenting with Chest pain of uncertain etiology with a hx of p A FIB  Room: 205    Subjective   53 year old female was admitted from [facility] ED due to tachycardia and dizziness this morning.  She has a smart watch that recorded her pulse at 172 with minimal exertion.  She also reports episodes of intermittent dizziness.  Intermittent episodes of menstrual-like chest pain.  However, in the emergency department apparently a PVC was noted when she had that sensation.  Her heart rate then went down to the 90s.  All labs are within normal limits except a D-dimer which is marginally elevated at 0.69.  No acute changes on EKG.  Troponin is normal.  Bilateral vascular studies were negative for DVT.  However she was brought up to the medical floor for observation on telemetry.    She has a known history of paroxysmal atrial fibrillation that was diagnosed May 2014.  She is followed by cardiologist Dr. Edmond Post.  She is maintained on flecainide 100 mg twice daily, metoprolol tartrate 100 mg twice daily, and 5mg Eliquis twice daily.  She does have a known history of DVT and bilateral PE within 6 weeks of an achilles surgery in 2010.    She does mention a recent change from furosemide to torsemide about 10 to 14 days ago.  Apparently patient was gaining weight and this was found to be water weight.  Review of 12/4/2020 echocardiogram does show some diastolic dysfunction but no overt acute congestive heart failure.  Denies history of cardiac ablation.      PMHx:  Past Medical History:   Diagnosis Date    Asthma     \"Severe persistent asthma\" per pulmonology    Chest pain     Numerous work ups for CP.    Chronic allergic rhinitis     Chronic constipation     Collapse of right lung     Chronic RML collapse with associated bronchiectasis, seen on CT    Dependence on nocturnal oxygen therapy     2-3 L O2 nightly    " Diverticulosis     Hiatal hernia with GERD     4cm per EGD 2023    History of venous thromboembolism 2010    6-8 weeks post L Achilles repair, LE DVT and unilateral PE. Treated w/ anticoagulation, then found to have possible PE in other lung    Hyperlipidemia     Hypertension     Migraine     Morbid obesity with BMI of 45.0-49.9, adult (CMS/[facility])     BRIDGER (obstructive sleep apnea)     partially CPAP compliant w/ 2-3L O2 bleed in    Paroxysmal atrial fibrillation (CMS/[facility])     Right renal stone     6 mm stone midpole caliceal system right kidney without obstruction, last seen 2023        Past Surgical Hx:    Past Surgical History:  2010: ACHILLES TENDON SURGERY; Left  No date: BI CORE NEEDLE BIOPSY RIGHT  No date:  SECTION, CLASSIC  No date: COLONOSCOPY  2022: CT ANGIO CORONARY ART WITH HEARTFLOW IF SCORE >30%      Comment:  CT HEART CORONARY ANGIOGRAM 2022 DOCTOR OFFICE                LEGACY  2023: ESOPHAGOSCOPY / EGD  No date: KNEE ARTHROSCOPY W/ DEBRIDEMENT; Right  2019: MR HEAD ANGIO WO IV CONTRAST      Comment:  [place] [facility] [place]                LEGACY  2019: MR NECK ANGIO WO IV CONTRAST      Comment:  MR NECK ANGIO WO IV CONTRAST [place] [facility] [place]                LEGACY  2006: PARTIAL HYSTERECTOMY      Comment:  fibroids/bleeding  2020: SHOULDER ARTHROSCOPY W/ ROTATOR CUFF REPAIR; Left  2023: TOE SURGERY; Right      Comment:  Digital arthroplasties with pin fixation, toes 2, 3,                right foot.    Social history:   reports that she has never smoked. She has never used smokeless tobacco. She reports current alcohol use of about 2.0 standard drinks of alcohol per week. She reports that she does not use drugs.    Family history:   Family History   Problem Relation Name Age of Onset    Diabetes Other      Hypertension Other      Stroke Other         Allergies   Allergen Reactions    Penicillins Other,  Shortness of breath and Unknown     asthma    Sulfa (Sulfonamide Antibiotics) Other     Asthma        Home Medication:   Acetaminophen (Tylenol) 325 mg tablet Take 3 tablets (975 mg) by mouth every 8 hours.   albuterol 90 mcg/actuation inhaler every 4 hours.   apixaban (Eliquis) 5 mg tablet Take 1 tablet (5 mg) by mouth twice a day.   atorvastatin (Lipitor) 40 mg tablet Take 1 tablet (40 mg) by mouth once daily.   azelastine (Astelin) 137 mcg (0.1 %) nasal spray 1 spray by Does not apply route every 12 hours if needed.   cholecalciferol (Vitamin D-3) 50,000 unit capsule Take by mouth.   diclofenac sodium (Voltaren) 1 % gel gel Apply 1 Application topically 4 times a day as needed (pain).   flecainide (Tambocor) 100 mg tablet Take 1 tablet (100 mg) by mouth 2 times a day.   ipratropium-albuteroL (Duo-Neb) 0.5-2.5 mg/3 mL nebulizer solution Inhale 3 mL every 4 hours.   Klor-Con M20 20 mEq ER tablet 1 tablet (20 mEq) 2 times a day.   lidocaine (Lidoderm) 5 % patch    linaCLOtide (Linzess) 145 mcg capsule Take 1 tablet by mouth once daily as needed.   lisinopril 40 mg tablet Take 1 tablet (40 mg) by mouth once daily.   loratadine (Claritin) 10 mg tablet Take 1 tablet (10 mg) by mouth once daily.   meclizine (Antivert) 25 mg tablet Take 1 tablet (25 mg) by mouth every 12 hours if needed.   methocarbamol (Robaxin) 500 mg tablet TAKE 1 TABLET BY MOUTH FOUR TIMES DAILY AS NEEDED FOR BACK SPASM   metoprolol tartrate (Lopressor) 100 mg tablet Take 1 tablet (100 mg) by mouth 2 times a day.   montelukast (Singulair) 10 mg tablet Take 1 tablet (10 mg) by mouth once every 24 hours.   omeprazole (PriLOSEC) 40 mg DR capsule 1 capsule (40 mg) once every 24 hours.   ondansetron (Zofran) 4 mg tablet Take 1 tablet (4 mg) by mouth every 6 hours if needed for nausea or vomiting.   predniSONE (Deltasone) 10 mg tablet Take 1 tablet (10 mg) by mouth once daily.   topiramate (Topamax) 100 mg tablet Take by mouth.   torsemide (Demadex) 20 mg  "tablet Take 2 tablets (40 mg) by mouth 2 times a day.   traMADol (Ultram) 50 mg tablet Take 1 tablet (50 mg) by mouth.   verapamil ER (Veralan PM) 300 mg 24 hr capsule Take 1 capsule (300 mg) by mouth once daily at bedtime.       Last Recorded Vitals  Blood pressure 107/53, pulse 75, temperature 36.3 °C (97.3 °F), temperature source Temporal, resp. rate 18, height 1.651 m (5' 5\"), weight 127 kg (279 lb 15.8 oz), SpO2 99 %.    Physical Exam  General: Patient in no acute distress, lying in bed  HEENT: EOMI, moist mucous membranes, anicteric  Neck: No JVD, no cervical lymphadenopathy  Heart: RRR, no murmurs, rubs, or gallops  Lungs: Clear to auscultation bilaterally, no wheezing, rhonchi, or rales  Abdomen: Soft, nontender, nondistended, no organomegaly  Extremities: No peripheral edema, +2 radial and pedal pulses  Skin: No rash or cyanosis  Neurological: AOx3, non focal  Psychiatric: Cooperative and pleasant    Recent Results:  Results for orders placed or performed during the hospital encounter of 11/28/23 (from the past 24 hour(s))   CBC and Auto Differential   Result Value Ref Range    WBC 5.9 4.4 - 11.3 x10*3/uL    nRBC      RBC 4.22 4.00 - 5.20 x10*6/uL    Hemoglobin 12.2 12.0 - 16.0 g/dL    Hematocrit 38.6 36.0 - 46.0 %    MCV 92 80 - 100 fL    MCH 28.9 26.0 - 34.0 pg    MCHC 31.6 (L) 32.0 - 36.0 g/dL    RDW 14.4 11.5 - 14.5 %    Platelets 292 150 - 450 x10*3/uL    Neutrophils % 52.2 40.0 - 80.0 %    Immature Granulocytes %, Automated 0.2 0.0 - 0.9 %    Lymphocytes % 33.5 13.0 - 44.0 %    Monocytes % 11.1 2.0 - 10.0 %    Eosinophils % 2.7 0.0 - 6.0 %    Basophils % 0.3 0.0 - 2.0 %    Neutrophils Absolute 3.07 1.20 - 7.70 x10*3/uL    Immature Granulocytes Absolute, Automated 0.01 0.00 - 0.70 x10*3/uL    Lymphocytes Absolute 1.97 1.20 - 4.80 x10*3/uL    Monocytes Absolute 0.65 0.10 - 1.00 x10*3/uL    Eosinophils Absolute 0.16 0.00 - 0.70 x10*3/uL    Basophils Absolute 0.02 0.00 - 0.10 x10*3/uL   Comprehensive " metabolic panel   Result Value Ref Range    Glucose 131 (H) 74 - 99 mg/dL    Sodium 140 136 - 145 mmol/L    Potassium 3.5 3.5 - 5.3 mmol/L    Chloride 101 98 - 107 mmol/L    Bicarbonate 29 21 - 32 mmol/L    Anion Gap 14 10 - 20 mmol/L    Urea Nitrogen 34 (H) 6 - 23 mg/dL    Creatinine 1.09 (H) 0.50 - 1.05 mg/dL    eGFR 62 >60 mL/min/1.73m*2    Calcium 9.3 8.6 - 10.3 mg/dL    Albumin 4.0 3.4 - 5.0 g/dL    Alkaline Phosphatase 54 33 - 110 U/L    Total Protein 7.1 6.4 - 8.2 g/dL    AST 12 9 - 39 U/L    Bilirubin, Total 0.5 0.0 - 1.2 mg/dL    ALT 13 7 - 45 U/L   Troponin I, High Sensitivity   Result Value Ref Range    Troponin I, High Sensitivity 3 0 - 13 ng/L   Light Blue Top   Result Value Ref Range    Extra Tube Hold for add-ons.    D-dimer, quantitative   Result Value Ref Range    D-Dimer Non VTE, Quant (mg/L FEU) 0.69 (H) 0.19 - 0.50 mg/L FEU   Magnesium   Result Value Ref Range    Magnesium 2.09 1.60 - 2.40 mg/dL   ECG 12 lead   Result Value Ref Range    Ventricular Rate 74 BPM    Atrial Rate 74 BPM    MT Interval 188 ms    QRS Duration 92 ms    QT Interval 398 ms    QTC Calculation(Bazett) 441 ms    P Axis 22 degrees    R Axis -21 degrees    T Axis 1 degrees    QRS Count 12 beats    Q Onset 221 ms    P Onset 127 ms    P Offset 178 ms    T Offset 420 ms    QTC Fredericia 427 ms   Troponin I, High Sensitivity   Result Value Ref Range    Troponin I, High Sensitivity 3 0 - 13 ng/L        Vascular US lower extremity venous duplex bilateral   Final Result   No evidence of DVT in the bilateral lower extremities.        Rouleaux flow is demonstrated throughout the bilateral lower   extremities.        MACRO:   None        Signed by: Chucky Otoole 11/28/2023 10:10 AM   Dictation workstation:   [id number]      XR chest 2 views   Final Result   No acute cardiopulmonary disease.        Signed by: Summer Hollingsworth 11/28/2023 8:19 AM   Dictation workstation:   [id number]           Assessment/Plan   1) paroxysmal atrial  fibrillation, HTN, diastolic dysfunction   Continue home cardiac medications including Eliquis, flecainide, metoprolol tartrate, lisinopril, verapamil   Continue telemetry   Add magnesium serum level   2 to 3 g sodium diet  2) DVT prophylaxis   Patient is already on 5 mg Eliquis twice daily for paroxysmal atrial fibrillation with a prior history of DVT/PE  Ambulation  SCDs  3) asthma   Respiratory therapy consulted.  Patient states she does not take her asthma medications on a regular basis.  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  Apparently she is on 10 mg of prednisone because of this.  4) obstructive sleep apnea   Patient is not always compliant with her CPAP.  She wears 2 to 3 L of O2 at night.  However, she states that she sleeps reclined she does not need her CPAP or O2.  Nocturnal pulse oximetry has been ordered.  Breathing treatments are as needed.  5) Disposition:   Expect to be discharged on day 1 if patient remains asymptomatic and paroxysmal atrial fibrillation is rate controlled.  Currently she is in normal sinus rhythm seen on telemetry.         I spent 65 minutes in the professional and overall care of this patient.      Mary Severino PA-C

## 2023-11-28 NOTE — LETTER
November 29, 2023     Patient: Betzaida Jc   YOB: 1970   Date of Visit: 11/28/2023       To Whom It May Concern:    Betzaida Jc was hospitalized from 11/28/2023 to 11/29/2023?. She may return to work 12/01/2023. If you have any questions or concerns, please don't hesitate to call.         Sincerely,         Fern Lima MD

## 2023-11-28 NOTE — ED PROVIDER NOTES
HPI   Chief Complaint   Patient presents with    Chest Pain     Patient reports history of afib.  States she had a sharp shooting chest pain yesterday but then it went away.  Woke up this morning with palpitations, chest pain (with N/T into jaw) and some SOB.         HPI  53-year-old female comes emergency room with a chief complaint of waking up this morning with a rapid heart rate of 172; patient has a history of paroxysmal A-fib so she follows her heart rate at home; patient states it then went down to the low 90s when she got up to walk it again accelerated; complaining of some chest pain and some dizziness; no recent fever chills night sweats; cardiologist is Dr. Post  Patient is on multiple cardiac meds and she is compliant; has a history of PE and DVT and takes Eliquis                  No data recorded                Patient History   Past Medical History:   Diagnosis Date    Asthma     Atrial fibrillation (CMS/HCC)     Hypertension     Personal history of other diseases of the circulatory system     History of hypertension    Personal history of other specified conditions     History of ulceration     Past Surgical History:   Procedure Laterality Date    CT ANGIO CORONARY ART WITH HEARTFLOW IF SCORE >30%  11/18/2022    CT HEART CORONARY ANGIOGRAM 11/18/2022 DOCTOR OFFICE LEGACY    FOOT SURGERY  03/19/2018    Foot Surgery    HYSTERECTOMY  03/19/2018    Hysterectomy    KNEE ARTHROSCOPY W/ DEBRIDEMENT  10/21/2013    Arthroscopy Knee Right    MR HEAD ANGIO WO IV CONTRAST  4/5/2019    [place] ANGIO [place] [facility] [place]    MR NECK ANGIO WO IV CONTRAST  4/5/2019    [place] NECK ANGIO WO [place] [facility] [place]     Family History   Problem Relation Name Age of Onset    Diabetes Other      Hypertension Other      Stroke Other       Social History     Tobacco Use    Smoking status: Never    Smokeless tobacco: Never   Vaping Use    Vaping Use: Never used   Substance Use Topics     Alcohol use: Yes     Alcohol/week: 2.0 standard drinks of alcohol     Types: 2 Standard drinks or equivalent per week    Drug use: Never       Physical Exam   ED Triage Vitals [11/28/23 0730]   Temp Heart Rate Resp BP   36.6 °C (97.9 °F) 77 16 116/86      SpO2 Temp src Heart Rate Source Patient Position   100 % -- -- --      BP Location FiO2 (%)     -- --       Physical Exam  Patient is alert in no acute distress  Lungs are clear to auscultation and percussion  Cardiac rate is regular rate and rhythm normal S1-S2 without murmur gallop rub click S3 or S4  Abdomen is benign  Extremities without cyanosis clubbing erythema or edema  ED Course & MDM   Chest x-ray PA and lateral is normal  All labs are within normal limits except D-dimer which is marginally elevated at 0.69  EKG shows no acute changes  Troponin is normal  Bilateral vascular studies lower extremities are negative for DVT  Patient had similar symptoms in the beginning of October had a CT angiogram at that time which was negative for PE  Will bring patient in observation status put her on telemetry and watch for any further issues; she recently had her diuretic dose increased and wondering if she is somewhat dehydrated causing the symptoms  And is clinically stable at this writing  Diagnoses as of 11/28/23 1023   Atypical chest pain       Medical Decision Making      Procedure  Procedures     Fern Lima MD  11/28/23 1027

## 2023-11-28 NOTE — CARE PLAN
RN TCC met with patient at the bedside to complete assessment. (SEE EPIC)  53 yr old male admitted for chest pain of unknown etiology. Treatment includes: Labs, EKG, vascular studies and imaging.   Patient has a medical history of Afib and Sleep Apnea. She takes Eliquis daily, and wears a CPAP at night.   Denies smoking or drug use, but states she drinks on occasion.   TCC anticipates home with no skilled needs, Cardio follow up, PCP follow up.

## 2023-11-28 NOTE — CARE PLAN
The patient's goals for the shift include      The clinical goals for the shift include cruz free

## 2023-11-28 NOTE — NURSING NOTE
Reviewed discharge instructions and medications with patient at the bedside. Pt verbalized understanding. IV discontinued without incidence. Tip intact and pressure applied. Medications from [facility] with patient belongings. Pt wheeled down via wheelchair by UMBERTO Eagle and assisted into vehicle.

## 2023-11-29 ENCOUNTER — APPOINTMENT (OUTPATIENT)
Dept: CARDIOLOGY | Facility: HOSPITAL | Age: 53
End: 2023-11-29
Payer: COMMERCIAL

## 2023-11-29 ENCOUNTER — HOSPITAL ENCOUNTER (OUTPATIENT)
Dept: CARDIOLOGY | Facility: HOSPITAL | Age: 53
Discharge: HOME | End: 2023-11-29
Payer: COMMERCIAL

## 2023-11-29 VITALS
HEIGHT: 65 IN | TEMPERATURE: 97.2 F | RESPIRATION RATE: 16 BRPM | OXYGEN SATURATION: 100 % | SYSTOLIC BLOOD PRESSURE: 109 MMHG | HEART RATE: 58 BPM | DIASTOLIC BLOOD PRESSURE: 63 MMHG | BODY MASS INDEX: 46.61 KG/M2 | WEIGHT: 279.76 LBS

## 2023-11-29 LAB
ANION GAP SERPL CALC-SCNC: 13 MMOL/L (ref 10–20)
ATRIAL RATE: 60 BPM
ATRIAL RATE: 60 BPM
BASOPHILS # BLD AUTO: 0.02 X10*3/UL (ref 0–0.1)
BASOPHILS NFR BLD AUTO: 0.3 %
BUN SERPL-MCNC: 28 MG/DL (ref 6–23)
CALCIUM SERPL-MCNC: 8.8 MG/DL (ref 8.6–10.3)
CHLORIDE SERPL-SCNC: 105 MMOL/L (ref 98–107)
CO2 SERPL-SCNC: 26 MMOL/L (ref 21–32)
CREAT SERPL-MCNC: 0.84 MG/DL (ref 0.5–1.05)
EOSINOPHIL # BLD AUTO: 0.15 X10*3/UL (ref 0–0.7)
EOSINOPHIL NFR BLD AUTO: 2.4 %
ERYTHROCYTE [DISTWIDTH] IN BLOOD BY AUTOMATED COUNT: 14.4 % (ref 11.5–14.5)
GFR SERPL CREATININE-BSD FRML MDRD: 84 ML/MIN/1.73M*2
GLUCOSE SERPL-MCNC: 110 MG/DL (ref 74–99)
HCT VFR BLD AUTO: 36.3 % (ref 36–46)
HGB BLD-MCNC: 11.4 G/DL (ref 12–16)
IMM GRANULOCYTES # BLD AUTO: 0.02 X10*3/UL (ref 0–0.7)
IMM GRANULOCYTES NFR BLD AUTO: 0.3 % (ref 0–0.9)
LYMPHOCYTES # BLD AUTO: 2.29 X10*3/UL (ref 1.2–4.8)
LYMPHOCYTES NFR BLD AUTO: 36.3 %
MCH RBC QN AUTO: 29.1 PG (ref 26–34)
MCHC RBC AUTO-ENTMCNC: 31.4 G/DL (ref 32–36)
MCV RBC AUTO: 93 FL (ref 80–100)
MONOCYTES # BLD AUTO: 0.61 X10*3/UL (ref 0.1–1)
MONOCYTES NFR BLD AUTO: 9.7 %
NEUTROPHILS # BLD AUTO: 3.22 X10*3/UL (ref 1.2–7.7)
NEUTROPHILS NFR BLD AUTO: 51 %
NRBC BLD-RTO: ABNORMAL /100{WBCS}
P AXIS: 65 DEGREES
P AXIS: 65 DEGREES
P OFFSET: 154 MS
P OFFSET: 154 MS
P ONSET: 118 MS
P ONSET: 118 MS
PLATELET # BLD AUTO: 246 X10*3/UL (ref 150–450)
POTASSIUM SERPL-SCNC: 3.9 MMOL/L (ref 3.5–5.3)
PR INTERVAL: 198 MS
PR INTERVAL: 198 MS
Q ONSET: 217 MS
Q ONSET: 217 MS
QRS COUNT: 9 BEATS
QRS COUNT: 9 BEATS
QRS DURATION: 90 MS
QRS DURATION: 90 MS
QT INTERVAL: 442 MS
QT INTERVAL: 442 MS
QTC CALCULATION(BAZETT): 442 MS
QTC CALCULATION(BAZETT): 442 MS
QTC FREDERICIA: 442 MS
QTC FREDERICIA: 442 MS
R AXIS: -14 DEGREES
R AXIS: -14 DEGREES
RBC # BLD AUTO: 3.92 X10*6/UL (ref 4–5.2)
SODIUM SERPL-SCNC: 140 MMOL/L (ref 136–145)
T AXIS: -2 DEGREES
T AXIS: -2 DEGREES
T OFFSET: 438 MS
T OFFSET: 438 MS
VENTRICULAR RATE: 60 BPM
VENTRICULAR RATE: 60 BPM
WBC # BLD AUTO: 6.3 X10*3/UL (ref 4.4–11.3)

## 2023-11-29 PROCEDURE — 85025 COMPLETE CBC W/AUTO DIFF WBC: CPT | Performed by: STUDENT IN AN ORGANIZED HEALTH CARE EDUCATION/TRAINING PROGRAM

## 2023-11-29 PROCEDURE — 93005 ELECTROCARDIOGRAM TRACING: CPT

## 2023-11-29 PROCEDURE — 2500000001 HC RX 250 WO HCPCS SELF ADMINISTERED DRUGS (ALT 637 FOR MEDICARE OP): Performed by: EMERGENCY MEDICINE

## 2023-11-29 PROCEDURE — 2500000004 HC RX 250 GENERAL PHARMACY W/ HCPCS (ALT 636 FOR OP/ED): Performed by: EMERGENCY MEDICINE

## 2023-11-29 PROCEDURE — G0378 HOSPITAL OBSERVATION PER HR: HCPCS

## 2023-11-29 PROCEDURE — 2500000005 HC RX 250 GENERAL PHARMACY W/O HCPCS: Performed by: EMERGENCY MEDICINE

## 2023-11-29 PROCEDURE — 2500000001 HC RX 250 WO HCPCS SELF ADMINISTERED DRUGS (ALT 637 FOR MEDICARE OP): Performed by: STUDENT IN AN ORGANIZED HEALTH CARE EDUCATION/TRAINING PROGRAM

## 2023-11-29 PROCEDURE — 36415 COLL VENOUS BLD VENIPUNCTURE: CPT | Performed by: STUDENT IN AN ORGANIZED HEALTH CARE EDUCATION/TRAINING PROGRAM

## 2023-11-29 PROCEDURE — 2500000004 HC RX 250 GENERAL PHARMACY W/ HCPCS (ALT 636 FOR OP/ED): Performed by: PHYSICIAN ASSISTANT

## 2023-11-29 PROCEDURE — 80048 BASIC METABOLIC PNL TOTAL CA: CPT | Performed by: STUDENT IN AN ORGANIZED HEALTH CARE EDUCATION/TRAINING PROGRAM

## 2023-11-29 PROCEDURE — 2500000001 HC RX 250 WO HCPCS SELF ADMINISTERED DRUGS (ALT 637 FOR MEDICARE OP): Performed by: PHYSICIAN ASSISTANT

## 2023-11-29 RX ORDER — POTASSIUM CHLORIDE 1500 MG/1
20 TABLET, EXTENDED RELEASE ORAL DAILY
Start: 2023-11-29

## 2023-11-29 RX ORDER — TORSEMIDE 20 MG/1
20 TABLET ORAL DAILY
Status: DISCONTINUED | OUTPATIENT
Start: 2023-11-30 | End: 2023-11-29 | Stop reason: HOSPADM

## 2023-11-29 RX ORDER — TORSEMIDE 20 MG/1
20 TABLET ORAL DAILY
Start: 2023-11-29

## 2023-11-29 RX ORDER — TORSEMIDE 20 MG/1
20 TABLET ORAL 2 TIMES DAILY
Status: DISCONTINUED | OUTPATIENT
Start: 2023-11-30 | End: 2023-11-29

## 2023-11-29 RX ORDER — POTASSIUM CHLORIDE 20 MEQ/1
20 TABLET, EXTENDED RELEASE ORAL DAILY
Status: DISCONTINUED | OUTPATIENT
Start: 2023-11-30 | End: 2023-11-29 | Stop reason: HOSPADM

## 2023-11-29 RX ORDER — TORSEMIDE 20 MG/1
40 TABLET ORAL 2 TIMES DAILY
Status: DISCONTINUED | OUTPATIENT
Start: 2023-11-29 | End: 2023-11-29

## 2023-11-29 RX ADMIN — TORSEMIDE 40 MG: 20 TABLET ORAL at 09:48

## 2023-11-29 RX ADMIN — ONDANSETRON 4 MG: 4 TABLET, ORALLY DISINTEGRATING ORAL at 09:09

## 2023-11-29 RX ADMIN — POLYETHYLENE GLYCOL 3350 17 G: 17 POWDER, FOR SOLUTION ORAL at 09:16

## 2023-11-29 RX ADMIN — PANTOPRAZOLE SODIUM 40 MG: 40 TABLET, DELAYED RELEASE ORAL at 06:53

## 2023-11-29 RX ADMIN — PREDNISONE 10 MG: 10 TABLET ORAL at 06:53

## 2023-11-29 RX ADMIN — VERAPAMIL HYDROCHLORIDE 300 MG: 180 TABLET, FILM COATED, EXTENDED RELEASE ORAL at 09:16

## 2023-11-29 RX ADMIN — APIXABAN 5 MG: 5 TABLET, FILM COATED ORAL at 09:14

## 2023-11-29 RX ADMIN — LORATADINE 10 MG: 10 TABLET ORAL at 09:13

## 2023-11-29 RX ADMIN — METOPROLOL TARTRATE 100 MG: 50 TABLET ORAL at 09:12

## 2023-11-29 RX ADMIN — FLECAINIDE ACETATE 100 MG: 50 TABLET ORAL at 09:15

## 2023-11-29 RX ADMIN — ACETAMINOPHEN 650 MG: 325 TABLET ORAL at 05:30

## 2023-11-29 RX ADMIN — POTASSIUM CHLORIDE 20 MEQ: 1500 TABLET, EXTENDED RELEASE ORAL at 09:13

## 2023-11-29 RX ADMIN — LISINOPRIL 40 MG: 20 TABLET ORAL at 09:13

## 2023-11-29 RX ADMIN — MONTELUKAST 10 MG: 10 TABLET, FILM COATED ORAL at 09:13

## 2023-11-29 ASSESSMENT — COGNITIVE AND FUNCTIONAL STATUS - GENERAL
DAILY ACTIVITIY SCORE: 24
MOBILITY SCORE: 24

## 2023-11-29 ASSESSMENT — PAIN - FUNCTIONAL ASSESSMENT
PAIN_FUNCTIONAL_ASSESSMENT: 0-10
PAIN_FUNCTIONAL_ASSESSMENT: 0-10

## 2023-11-29 ASSESSMENT — PAIN SCALES - GENERAL
PAINLEVEL_OUTOF10: 0 - NO PAIN
PAINLEVEL_OUTOF10: 2
PAINLEVEL_OUTOF10: 4

## 2023-11-29 NOTE — CARE PLAN
The patient's goals for the shift include  pain free     The clinical goals for the shift include pain free

## 2023-11-29 NOTE — NURSING NOTE
Patient c/o headache requested tylenol. Pt was given prn tylenol 650mg by mouth as ordered. No c/o chest pain or discomfort VSS afebrile

## 2023-11-29 NOTE — NURSING NOTE
Reviewed discharge instructions and medications with patient at the bedside. Pt verbalized understanding. IV discontinued without incidence. Tip intact and pressure applied. Pt walked down by UMBERTO Eagle and assisted into vehicle.

## 2023-11-29 NOTE — CARE PLAN
"53 yr old female admitted for chest pain, dizziness, and tachycardia.   Patient on telemetry for close monitoring. Pt still c/o dizziness and \"a little\" tightness in the chest yesterday.   Patient did state she currently takes Meclizine for vertigo as needed.  Will work with PT today to determine if she will transition home today.  Will transition home with no skilled needs when medically ready.      " Benzoyl Peroxide Pregnancy And Lactation Text: This medication is Pregnancy Category C. It is unknown if benzoyl peroxide is excreted in breast milk.

## 2023-11-29 NOTE — NURSING NOTE
Patient assessment completed at this time, no c/o chest pain or discomfort. Pt is A&Ox4 follows commands appropriately neuro checks completed. No further needs noted, call light is within reach.

## 2023-11-29 NOTE — NURSING NOTE
Patient stated headache is getting better, no need for additional meds at this time. No c/o chest pain or discomfort noted. Call light is within reach.

## 2023-11-29 NOTE — NURSING NOTE
Assumed care of pt from ED, RN. Pt oriented to environment at this time. Lung sounds clear bilaterally. Telemetry applied. Pt c/o intermittent mild chest cramping. MD Lima aware. EKG completed in ED and per RN no abnormal findings. Vitals WNL. Bed alarm activated.

## 2023-11-29 NOTE — PROGRESS NOTES
Patient seen, chart reviewed.  Vital signs are stable, heart rate remains in the 70s.  Patient denies any chest pain or other acute complaints.  Blood pressure on the low side - will give a half dose of patient's home metoprolol this evening.      Blanca Hernandez MD

## 2023-11-29 NOTE — CARE PLAN
The patient's goals for the shift include      The clinical goals for the shift include pain free    Over the shift, the patient did not make progress toward the following goals. Barriers to progression include pain meds. Recommendations to address these barriers include pain meds.

## 2023-11-29 NOTE — NURSING NOTE
Assumed care of pt from nightshift, RN. Pt resting in bed with call light in reach. Telemetry pack in place. Lung sounds clear bilaterally. Denies any pain and bed alarm activated.

## 2023-11-29 NOTE — PROGRESS NOTES
"  Progress Note:    Betzaida Jc is a 53 y.o. female on day 1 of admission presenting with Chest pain of uncertain etiology with a hx of p A FIB.    Subjective   Patient is doing well.  Has no complaints.  Per telemetry she has not had any episodes of atrial fibrillation.  No tachycardia on vital sign checks.  We did discuss her recent change in diuretics.  With review of her medical record it appears as though she was on 40 mg furosemide twice daily.  Then switched to 40 mg of torsemide twice daily.  Excessive diuresis was likely the catalyst to her tachycardia.  We will decrease and modify her torsemide to 20 mg daily and she will follow-up with her cardiologist.        Physical Exam  General: No acute distress, alert & oriented    Cardiac: RRR, NL S1 and S2, no murmurs, rubs or gallops    Pulmonary: Lungs clear to auscultation bilaterally, no wheezes, rhales or rhonchi    Abdomen: Soft, non-tender, non-distended    Extremities: No clubbing , cyanosis or edema.     Last Recorded Vitals  Blood pressure 121/68, pulse 83, temperature 36.2 °C (97.2 °F), temperature source Temporal, resp. rate 18, height 1.651 m (5' 5\"), weight 127 kg (279 lb 12.2 oz), SpO2 97 %.  Scheduled medications   Medication Dose Route Frequency    apixaban  5 mg oral BID    atorvastatin  40 mg oral Daily    flecainide  100 mg oral BID    lidocaine  1 patch transdermal Daily    lisinopril  40 mg oral Daily    loratadine  10 mg oral Daily    metoprolol tartrate  100 mg oral BID    montelukast  10 mg oral q24h    pantoprazole  40 mg oral Daily before breakfast    polyethylene glycol  17 g oral Daily    [START ON 11/30/2023] potassium chloride CR  20 mEq oral Daily    predniSONE  10 mg oral Daily before breakfast    topiramate  25 mg oral Daily    [START ON 11/30/2023] torsemide  20 mg oral Daily    verapamil SR (Calan-SR) 300 mg  300 mg oral Daily     Relevant Results  Results from last 7 days   Lab Units 11/29/23  0524 11/28/23  0739   WBC " AUTO x10*3/uL 6.3 5.9   HEMOGLOBIN g/dL 11.4* 12.2   HEMATOCRIT % 36.3 38.6   PLATELETS AUTO x10*3/uL 246 292      Results from last 7 days   Lab Units 11/29/23  0524 11/28/23  0739   SODIUM mmol/L 140 140   POTASSIUM mmol/L 3.9 3.5   CHLORIDE mmol/L 105 101   CO2 mmol/L 26 29   BUN mg/dL 28* 34*   CREATININE mg/dL 0.84 1.09*   GLUCOSE mg/dL 110* 131*   CALCIUM mg/dL 8.8 9.3         Assessment/Plan   1) paroxysmal atrial fibrillation, HTN, diastolic dysfunction              Continue home cardiac medications including Eliquis, flecainide, metoprolol tartrate, lisinopril, verapamil   Change torsemide from 40mg bid to 20mg daily. Continue home potassium when taking furosemide.                Monitor HR when ambulatory. If she does not have any inappropriate HR with ambulation then she can be discharged with follow up with her cardiologist.   Continue telemetry              Continue 2 to 3 g sodium diet  2) DVT prophylaxis              Patient is already on 5 mg Eliquis twice daily for paroxysmal atrial fibrillation with a prior history of DVT/PE  Ambulation  SCDs  3) asthma              Respiratory therapy consulted.  Patient states she does not take her asthma medications on a regular basis.  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  Review of her pulmonologist office note, she's on 10 mg of prednisone for her asthma.   4) obstructive sleep apnea              Patient is not always compliant with her CPAP.  She wears 2 to 3 L of O2 at night.  However, she states that she sleeps reclined she does not need her CPAP or O2.  Nocturnal pulse oximetry has been ordered.  Breathing treatments are as needed.  5) Disposition:              Change torsemide from 40mg bid to 20mg daily. Continue home potassium when taking furosemide.                Monitor HR when ambulatory. If she does not have any inappropriate HR with ambulation then she can be discharged with follow up with her cardiologist.        I spent 45 minutes in the professional and overall care of this patient.      Mary Severino PA-C

## 2023-11-29 NOTE — DISCHARGE SUMMARY
Discharge Diagnosis  Chest pain of uncertain etiology with a h/o pAFIB    Issues Requiring Follow-Up  Fast heart rate, chest pain, and diuretic adjustments    Test Results Pending At Discharge  Pending Labs       No current pending labs.            Hospital Course   53 year old female was admitted from [facility] ED due to tachycardia and dizziness this morning.  She has a smart watch that recorded her pulse at 172 with minimal exertion.  She also reports episodes of intermittent dizziness.  Intermittent episodes of menstrual-like chest pain.  However, in the emergency department apparently a PVC was noted when she had that sensation.  Her heart rate then went down to the 90s.  All labs are within normal limits except a D-dimer which is marginally elevated at 0.69.  No acute changes on EKG.  Troponin is normal.  Bilateral vascular studies were negative for DVT.  However she was brought up to the medical floor for observation on telemetry.     Throughout her stay, telemetry did not show any episodes of atrial fibrillation however she did have episodes of PVCs.  Her heart rate remained within normal limits.  However there was a discrepancy on heart rate monitor versus telemetry while doing a heart rate ambulation test.  POC heart rate noted 157 bpm but telemetry was in the 70s.  I believe this was picking up ectopic beats.  She was also rehydrated with IV fluids due to being overly diuresed by her home medication of 40 mg torsemide twice daily.  We adjusted this to 20mg torsemide daily with supplemental potassium.  Patient stayed hemodynamically stable throughout her stay and was asymptomatic from chest pain, shortness of breath, lightheadedness, or dizziness.  She is to follow-up with her cardiologist Dr. Post on discharge.    Physical Exam At Time of Discharge  General: No acute distress, alert & oriented    Cardiac: RRR, NL S1 and S2, no murmurs, rubs or gallops    Pulmonary: Lungs clear to  auscultation bilaterally, no wheezes, rhales or rhonchi    Abdomen: Soft, non-tender, non-distended    Extremities: No clubbing , cyanosis or edema. Surgical dressing dry and intact on operative extremity.    Home Medications     Medication List      CHANGE how you take these medications     Klor-Con M20 20 mEq ER tablet; Generic drug: potassium chloride CR; Take   1 tablet (20 mEq) by mouth once daily. Take with torsemide; What changed:   how to take this, when to take this, additional instructions   torsemide 20 mg tablet; Commonly known as: Demadex; Take 1 tablet (20   mg) by mouth once daily.; What changed: how much to take, when to take   this     CONTINUE taking these medications     acetaminophen 325 mg tablet; Commonly known as: Tylenol   albuterol 90 mcg/actuation inhaler   apixaban 5 mg tablet; Commonly known as: Eliquis   atorvastatin 40 mg tablet; Commonly known as: Lipitor   azelastine 137 mcg (0.1 %) nasal spray; Commonly known as: Astelin   cholecalciferol 50,000 unit capsule; Commonly known as: Vitamin D-3   diclofenac sodium 1 % gel gel; Commonly known as: Voltaren; Apply 1   Application topically 4 times a day as needed (pain).   flecainide 100 mg tablet; Commonly known as: Tambocor   ipratropium-albuteroL 0.5-2.5 mg/3 mL nebulizer solution; Commonly known   as: Duo-Neb   Lidoderm 5 % patch; Generic drug: lidocaine   linaCLOtide 145 mcg capsule; Commonly known as: Linzess   lisinopril 40 mg tablet   loratadine 10 mg tablet; Commonly known as: Claritin   meclizine 25 mg tablet; Commonly known as: Antivert   methocarbamol 500 mg tablet; Commonly known as: Robaxin   metoprolol tartrate 100 mg tablet; Commonly known as: Lopressor   montelukast 10 mg tablet; Commonly known as: Singulair   omeprazole 40 mg DR capsule; Commonly known as: PriLOSEC   predniSONE 10 mg tablet; Commonly known as: Deltasone   topiramate 100 mg tablet; Commonly known as: Topamax   traMADol 50 mg tablet; Commonly known as:  Ultram   verapamil  mg 24 hr capsule; Commonly known as: Veralan PM   Zofran 4 mg tablet; Generic drug: ondansetron       Outpatient Follow-Up  She is to follow-up with her cardiologist Dr. Post on discharge which is listed on her AVS instructions.       Mary Severino PA-C

## 2024-01-22 ENCOUNTER — APPOINTMENT (OUTPATIENT)
Dept: ORTHOPEDIC SURGERY | Facility: CLINIC | Age: 54
End: 2024-01-22
Payer: COMMERCIAL

## 2024-03-21 ENCOUNTER — LAB (OUTPATIENT)
Dept: LAB | Facility: LAB | Age: 54
End: 2024-03-21
Payer: COMMERCIAL

## 2024-03-21 DIAGNOSIS — I11.0 HYPERTENSIVE HEART DISEASE WITH HEART FAILURE (MULTI): ICD-10-CM

## 2024-03-21 DIAGNOSIS — E11.65 TYPE 2 DIABETES MELLITUS WITH HYPERGLYCEMIA (MULTI): Primary | ICD-10-CM

## 2024-03-21 LAB
ALBUMIN SERPL BCP-MCNC: 3.8 G/DL (ref 3.4–5)
ALP SERPL-CCNC: 58 U/L (ref 33–110)
ALT SERPL W P-5'-P-CCNC: 21 U/L (ref 7–45)
ANION GAP SERPL CALC-SCNC: 11 MMOL/L (ref 10–20)
AST SERPL W P-5'-P-CCNC: 19 U/L (ref 9–39)
BILIRUB SERPL-MCNC: 0.4 MG/DL (ref 0–1.2)
BUN SERPL-MCNC: 19 MG/DL (ref 6–23)
CALCIUM SERPL-MCNC: 9.2 MG/DL (ref 8.6–10.6)
CHLORIDE SERPL-SCNC: 106 MMOL/L (ref 98–107)
CO2 SERPL-SCNC: 28 MMOL/L (ref 21–32)
CREAT SERPL-MCNC: 0.75 MG/DL (ref 0.5–1.05)
EGFRCR SERPLBLD CKD-EPI 2021: >90 ML/MIN/1.73M*2
EST. AVERAGE GLUCOSE BLD GHB EST-MCNC: 143 MG/DL
GLUCOSE SERPL-MCNC: 117 MG/DL (ref 74–99)
HBA1C MFR BLD: 6.6 %
POTASSIUM SERPL-SCNC: 4.3 MMOL/L (ref 3.5–5.3)
PROT SERPL-MCNC: 6.7 G/DL (ref 6.4–8.2)
SODIUM SERPL-SCNC: 141 MMOL/L (ref 136–145)

## 2024-03-21 PROCEDURE — 80053 COMPREHEN METABOLIC PANEL: CPT

## 2024-03-21 PROCEDURE — 36415 COLL VENOUS BLD VENIPUNCTURE: CPT

## 2024-03-21 PROCEDURE — 83036 HEMOGLOBIN GLYCOSYLATED A1C: CPT

## 2024-03-30 ENCOUNTER — APPOINTMENT (OUTPATIENT)
Dept: CARDIOLOGY | Facility: HOSPITAL | Age: 54
End: 2024-03-30
Payer: COMMERCIAL

## 2024-03-30 ENCOUNTER — HOSPITAL ENCOUNTER (OUTPATIENT)
Dept: CARDIOLOGY | Facility: HOSPITAL | Age: 54
Discharge: HOME | End: 2024-03-30
Payer: COMMERCIAL

## 2024-03-30 ENCOUNTER — APPOINTMENT (OUTPATIENT)
Dept: RADIOLOGY | Facility: HOSPITAL | Age: 54
End: 2024-03-30
Payer: COMMERCIAL

## 2024-03-30 ENCOUNTER — HOSPITAL ENCOUNTER (EMERGENCY)
Facility: HOSPITAL | Age: 54
Discharge: HOME | End: 2024-03-30
Attending: FAMILY MEDICINE
Payer: COMMERCIAL

## 2024-03-30 VITALS
RESPIRATION RATE: 16 BRPM | OXYGEN SATURATION: 98 % | TEMPERATURE: 98.3 F | HEART RATE: 64 BPM | WEIGHT: 293 LBS | DIASTOLIC BLOOD PRESSURE: 84 MMHG | BODY MASS INDEX: 48.82 KG/M2 | SYSTOLIC BLOOD PRESSURE: 132 MMHG | HEIGHT: 65 IN

## 2024-03-30 DIAGNOSIS — R42 DIZZINESS: ICD-10-CM

## 2024-03-30 DIAGNOSIS — R61 DIAPHORESIS: Primary | ICD-10-CM

## 2024-03-30 DIAGNOSIS — E11.9 TYPE 2 DIABETES MELLITUS TREATED WITHOUT INSULIN (MULTI): ICD-10-CM

## 2024-03-30 DIAGNOSIS — R51.9 CHRONIC NONINTRACTABLE HEADACHE, UNSPECIFIED HEADACHE TYPE: ICD-10-CM

## 2024-03-30 DIAGNOSIS — G89.29 CHRONIC NONINTRACTABLE HEADACHE, UNSPECIFIED HEADACHE TYPE: ICD-10-CM

## 2024-03-30 LAB
ALBUMIN SERPL BCP-MCNC: 4 G/DL (ref 3.4–5)
ALP SERPL-CCNC: 59 U/L (ref 33–110)
ALT SERPL W P-5'-P-CCNC: 21 U/L (ref 7–45)
ANION GAP SERPL CALC-SCNC: 13 MMOL/L (ref 10–20)
APPEARANCE UR: CLEAR
AST SERPL W P-5'-P-CCNC: 15 U/L (ref 9–39)
BACTERIA #/AREA URNS AUTO: ABNORMAL /HPF
BASOPHILS # BLD AUTO: 0.02 X10*3/UL (ref 0–0.1)
BASOPHILS NFR BLD AUTO: 0.4 %
BILIRUB SERPL-MCNC: 0.4 MG/DL (ref 0–1.2)
BILIRUB UR STRIP.AUTO-MCNC: NEGATIVE MG/DL
BUN SERPL-MCNC: 23 MG/DL (ref 6–23)
CALCIUM SERPL-MCNC: 9.3 MG/DL (ref 8.6–10.3)
CARDIAC TROPONIN I PNL SERPL HS: <3 NG/L (ref 0–13)
CARDIAC TROPONIN I PNL SERPL HS: <3 NG/L (ref 0–13)
CHLORIDE SERPL-SCNC: 104 MMOL/L (ref 98–107)
CO2 SERPL-SCNC: 28 MMOL/L (ref 21–32)
COLOR UR: YELLOW
CREAT SERPL-MCNC: 0.97 MG/DL (ref 0.5–1.05)
EGFRCR SERPLBLD CKD-EPI 2021: 70 ML/MIN/1.73M*2
EOSINOPHIL # BLD AUTO: 0.12 X10*3/UL (ref 0–0.7)
EOSINOPHIL NFR BLD AUTO: 2.1 %
ERYTHROCYTE [DISTWIDTH] IN BLOOD BY AUTOMATED COUNT: 14.3 % (ref 11.5–14.5)
GLUCOSE SERPL-MCNC: 122 MG/DL (ref 74–99)
GLUCOSE UR STRIP.AUTO-MCNC: NEGATIVE MG/DL
HCT VFR BLD AUTO: 38.8 % (ref 36–46)
HGB BLD-MCNC: 12 G/DL (ref 12–16)
HOLD SPECIMEN: NORMAL
IMM GRANULOCYTES # BLD AUTO: 0.01 X10*3/UL (ref 0–0.7)
IMM GRANULOCYTES NFR BLD AUTO: 0.2 % (ref 0–0.9)
KETONES UR STRIP.AUTO-MCNC: NEGATIVE MG/DL
LEUKOCYTE ESTERASE UR QL STRIP.AUTO: NEGATIVE
LYMPHOCYTES # BLD AUTO: 0.79 X10*3/UL (ref 1.2–4.8)
LYMPHOCYTES NFR BLD AUTO: 14 %
MAGNESIUM SERPL-MCNC: 2.06 MG/DL (ref 1.6–2.4)
MCH RBC QN AUTO: 29.1 PG (ref 26–34)
MCHC RBC AUTO-ENTMCNC: 30.9 G/DL (ref 32–36)
MCV RBC AUTO: 94 FL (ref 80–100)
MONOCYTES # BLD AUTO: 0.36 X10*3/UL (ref 0.1–1)
MONOCYTES NFR BLD AUTO: 6.4 %
NEUTROPHILS # BLD AUTO: 4.33 X10*3/UL (ref 1.2–7.7)
NEUTROPHILS NFR BLD AUTO: 76.9 %
NITRITE UR QL STRIP.AUTO: NEGATIVE
NRBC BLD-RTO: ABNORMAL /100{WBCS}
PH UR STRIP.AUTO: 6.5 [PH]
PLATELET # BLD AUTO: 290 X10*3/UL (ref 150–450)
POTASSIUM SERPL-SCNC: 3.9 MMOL/L (ref 3.5–5.3)
PROT SERPL-MCNC: 7.3 G/DL (ref 6.4–8.2)
PROT UR STRIP.AUTO-MCNC: NEGATIVE MG/DL
RBC # BLD AUTO: 4.12 X10*6/UL (ref 4–5.2)
RBC # UR STRIP.AUTO: ABNORMAL /UL
RBC #/AREA URNS AUTO: ABNORMAL /HPF
SODIUM SERPL-SCNC: 141 MMOL/L (ref 136–145)
SP GR UR STRIP.AUTO: 1.02
SQUAMOUS #/AREA URNS AUTO: ABNORMAL /HPF
TSH SERPL DL<=0.05 MIU/L-ACNC: 0.6 MIU/L (ref 0.27–4.2)
UROBILINOGEN UR STRIP.AUTO-MCNC: 1 MG/DL
WBC # BLD AUTO: 5.6 X10*3/UL (ref 4.4–11.3)
WBC #/AREA URNS AUTO: ABNORMAL /HPF

## 2024-03-30 PROCEDURE — 93005 ELECTROCARDIOGRAM TRACING: CPT

## 2024-03-30 PROCEDURE — 85025 COMPLETE CBC W/AUTO DIFF WBC: CPT | Performed by: FAMILY MEDICINE

## 2024-03-30 PROCEDURE — 84484 ASSAY OF TROPONIN QUANT: CPT | Performed by: FAMILY MEDICINE

## 2024-03-30 PROCEDURE — 81001 URINALYSIS AUTO W/SCOPE: CPT | Performed by: FAMILY MEDICINE

## 2024-03-30 PROCEDURE — 71046 X-RAY EXAM CHEST 2 VIEWS: CPT | Performed by: RADIOLOGY

## 2024-03-30 PROCEDURE — 84443 ASSAY THYROID STIM HORMONE: CPT | Performed by: FAMILY MEDICINE

## 2024-03-30 PROCEDURE — 80053 COMPREHEN METABOLIC PANEL: CPT | Performed by: FAMILY MEDICINE

## 2024-03-30 PROCEDURE — 71046 X-RAY EXAM CHEST 2 VIEWS: CPT

## 2024-03-30 PROCEDURE — 36415 COLL VENOUS BLD VENIPUNCTURE: CPT | Performed by: FAMILY MEDICINE

## 2024-03-30 PROCEDURE — 2500000001 HC RX 250 WO HCPCS SELF ADMINISTERED DRUGS (ALT 637 FOR MEDICARE OP): Performed by: FAMILY MEDICINE

## 2024-03-30 PROCEDURE — 83735 ASSAY OF MAGNESIUM: CPT | Performed by: FAMILY MEDICINE

## 2024-03-30 PROCEDURE — 99283 EMERGENCY DEPT VISIT LOW MDM: CPT

## 2024-03-30 RX ORDER — MECLIZINE HCL 25MG 25 MG/1
25 TABLET, CHEWABLE ORAL ONCE
Status: COMPLETED | OUTPATIENT
Start: 2024-03-30 | End: 2024-03-30

## 2024-03-30 RX ADMIN — MECLIZINE HYDROCHLORIDE 25 MG: 25 TABLET, CHEWABLE ORAL at 13:41

## 2024-03-30 ASSESSMENT — PAIN - FUNCTIONAL ASSESSMENT
PAIN_FUNCTIONAL_ASSESSMENT: 0-10
PAIN_FUNCTIONAL_ASSESSMENT: 0-10

## 2024-03-30 ASSESSMENT — PAIN DESCRIPTION - PAIN TYPE: TYPE: ACUTE PAIN

## 2024-03-30 ASSESSMENT — COLUMBIA-SUICIDE SEVERITY RATING SCALE - C-SSRS
6. HAVE YOU EVER DONE ANYTHING, STARTED TO DO ANYTHING, OR PREPARED TO DO ANYTHING TO END YOUR LIFE?: NO
2. HAVE YOU ACTUALLY HAD ANY THOUGHTS OF KILLING YOURSELF?: NO
1. IN THE PAST MONTH, HAVE YOU WISHED YOU WERE DEAD OR WISHED YOU COULD GO TO SLEEP AND NOT WAKE UP?: NO

## 2024-03-30 ASSESSMENT — PAIN SCALES - GENERAL
PAINLEVEL_OUTOF10: 5 - MODERATE PAIN
PAINLEVEL_OUTOF10: 4

## 2024-03-30 ASSESSMENT — PAIN DESCRIPTION - LOCATION: LOCATION: FINGER (COMMENT WHICH ONE)

## 2024-03-30 ASSESSMENT — PAIN DESCRIPTION - DESCRIPTORS: DESCRIPTORS: PRESSURE

## 2024-03-30 NOTE — DISCHARGE INSTRUCTIONS
You were seen today for an episode that occurred at work of sweating, dizziness, right-sided headache, and chest pressure.  He reported at the time that your blood pressure was 215/117, and 176/98.  Your workup in the ED was inconclusive.  EKG showed a normal sinus rhythm.  Troponins were negative. CBC was normal. CMP was normal except for glucose of 120.    Magnesium was 2.06.  TSH is pending at discharge. UA showed trace blood otherwise negative.   He remains unclear why you had symptoms at work today.  DDx was discussed, and includes hypotension, hypoglycemia, vasovagal episode, hypertension, cardiac arrhythmia, other.    Recommend you follow-up with your PCP within the next 7 to 10 days.    Of note: Recent hemoglobin A1c on March 21 was 6.6, putting you in the category of diabetes (new diagnosis).  You should follow-up with your PCP to discuss use of a GLP-1 drug for both lowering your blood sugar and for weight loss.  You should recognize that without weight loss, you WILL develop new and more complicated comorbidities.

## 2024-03-30 NOTE — ED PROVIDER NOTES
"HPI   Chief Complaint   Patient presents with    Dizziness     Female patient presents to the ED with complaints of dizziness, headache and right sided chest pain non radiating x 30min. She states she was at rest when symptoms started.        54-year-old morbidly obese female with a BMI of 48.79, and history of atrial fibrillation (Eliquis), TIA, CAD, hyperlipidemia, hypertension, and COPD/asthma.  She works as a  at [employer].    Patient was at work today when she became rather suddenly diaphoretic, dizzy, with a right-sided mild headache.  She also noted some chest pressure but not pain.  She sat down.  Blood pressure taken at the time was 215/117, 176/98.  The [employer] pharmacist recommended she come to the ED.    Patient states she ate a leftover dinner for breakfast this morning.  She denies missing any blood pressure medications.  She currently takes metoprolol tartrate 100 mg twice daily, and lisinopril 40 mg daily. She reports her Verapamil was stopped 4 months ago.                         [place] Coma Scale Score: 15                     Patient History   Past Medical History:   Diagnosis Date    Asthma     \"Severe persistent asthma\" per pulmonology    Chest pain     Numerous work ups for CP.    Chronic allergic rhinitis     Chronic constipation     Collapse of right lung     Chronic RML collapse with associated bronchiectasis, seen on CT    Dependence on nocturnal oxygen therapy     2-3 L O2 nightly    Diverticulosis     Hiatal hernia with GERD     4cm per EGD 8/2023    History of venous thromboembolism 06/2010    6-8 weeks post L Achilles repair, LE DVT and unilateral PE. Treated w/ anticoagulation, then found to have possible PE in other lung    Hyperlipidemia     Hypertension     Migraine     Morbid obesity with BMI of 45.0-49.9, adult (CMS/[facility])     BRIDGER (obstructive sleep apnea)     partially CPAP compliant w/ 2-3L O2 bleed in    Paroxysmal atrial fibrillation (CMS/[facility])     Right renal stone "     6 mm stone midpole caliceal system right kidney without obstruction, last seen 2023     Past Surgical History:   Procedure Laterality Date    ACHILLES TENDON SURGERY Left 2010    BI CORE NEEDLE BIOPSY RIGHT       SECTION, CLASSIC      COLONOSCOPY      CT ANGIO CORONARY ART WITH HEARTFLOW IF SCORE >30%  2022    CT HEART CORONARY ANGIOGRAM 2022 DOCTOR OFFICE LEGACY    ESOPHAGOSCOPY / EGD  2023    KNEE ARTHROSCOPY W/ DEBRIDEMENT Right     MR HEAD ANGIO WO IV CONTRAST  2019    MR HEAD ANGIO WO IV CONTRAST 2019 [facility] ANCILLARY LEGACY    MR NECK ANGIO WO IV CONTRAST  2019    MR NECK ANGIO WO IV CONTRAST 2019 [facility] ANCILLARY [facility]GAC[facility]    PARTIAL HYSTERECTOMY  2006    fibroids/bleeding    SHOULDER ARTHROSCOPY W/ ROTATOR CUFF REPAIR Left 2020    TOE SURGERY Right 2023    Digital arthroplasties with pin fixation, toes 2, 3, right foot.     Family History   Problem Relation Name Age of Onset    Diabetes Other      Hypertension Other      Stroke Other       Social History     Tobacco Use    Smoking status: Never    Smokeless tobacco: Never   Vaping Use    Vaping Use: Never used   Substance Use Topics    Alcohol use: Yes     Alcohol/week: 2.0 standard drinks of alcohol     Types: 2 Standard drinks or equivalent per week    Drug use: Never       Physical Exam   ED Triage Vitals [24 1210]   Temperature Heart Rate Respirations BP   36.7 °C (98.1 °F) 72 18 (!) 136/102      Pulse Ox Temp Source Heart Rate Source Patient Position   99 % Temporal Monitor Sitting      BP Location FiO2 (%)     Left arm --       Physical Exam  Vitals and nursing note reviewed.   Constitutional:       Appearance: Normal appearance. She is obese.   HENT:      Head: Normocephalic.   Eyes:      Extraocular Movements: Extraocular movements intact.      Conjunctiva/sclera: Conjunctivae normal.   Neck:      Vascular: No carotid bruit.   Cardiovascular:      Rate and Rhythm: Normal rate  and regular rhythm.      Heart sounds: Normal heart sounds.   Pulmonary:      Effort: Pulmonary effort is normal.      Breath sounds: Normal breath sounds.   Abdominal:      General: Bowel sounds are normal.      Palpations: Abdomen is soft.   Musculoskeletal:      Cervical back: Neck supple. No rigidity or tenderness.   Neurological:      General: No focal deficit present.      Mental Status: She is alert and oriented to person, place, and time.   Psychiatric:         Mood and Affect: Mood normal.         Behavior: Behavior normal.       ED Course & MDM   Diagnoses as of 03/30/24 1423   Diaphoresis   Dizziness   Chronic nonintractable headache, unspecified headache type   Type 2 diabetes mellitus treated without insulin (CMS/[facility])       Medical Decision Making  EKG showed a normal sinus rhythm with a questionable septal infarct age undetermined.  Troponin was <3 x 2  Magnesium 2.06  CMP remarkable for glucose of 122, otherwise normal.  CBC normal  UA: trace blood, otherwise negative    Chest Xray: No evidence for acute cardiopulmonary process    Repeat blood pressure 115/82.    Workup today does not suggest any acute pathology or derangement other than her initial elevated blood pressure.  TSH is added to her labs and is pending at discharge    DDx discussed with patient and includes vasovagal episode, hypoglycemia, stress reaction, dehydration, other.    Advised patient to stay well-hydrated, but not fluid overloaded.      Follow-up with her PCP ASAP as her most recent hemoglobin A1c of 6.6 on March 21 puts her in the diabetes category, which would give her an opportunity (presumably without a cost burden) to begin a GLP-1 inhibitor drug for both blood sugar and weight loss.    She is advised that ongoing weight is going to produce guaranteed future additional comorbidities      Amount and/or Complexity of Data Reviewed  External Data Reviewed: labs, radiology and notes.  Labs: ordered. Decision-making details  documented in ED Course.        Procedure  Procedures     Jose Krishnamurthy MD  03/30/24 1338       Jose Krishnamurthy MD  03/30/24 1423       Jose Krishnamurthy MD  03/30/24 1511       Jose Krishnamurthy MD  03/30/24 1600

## 2024-04-01 LAB
ATRIAL RATE: 60 BPM
ATRIAL RATE: 67 BPM
P AXIS: -2 DEGREES
P AXIS: 59 DEGREES
P OFFSET: 177 MS
P OFFSET: 178 MS
P ONSET: 127 MS
P ONSET: 137 MS
PR INTERVAL: 164 MS
PR INTERVAL: 186 MS
Q ONSET: 219 MS
Q ONSET: 220 MS
QRS COUNT: 10 BEATS
QRS COUNT: 11 BEATS
QRS DURATION: 88 MS
QRS DURATION: 90 MS
QT INTERVAL: 418 MS
QT INTERVAL: 452 MS
QTC CALCULATION(BAZETT): 441 MS
QTC CALCULATION(BAZETT): 452 MS
QTC FREDERICIA: 434 MS
QTC FREDERICIA: 452 MS
R AXIS: -15 DEGREES
R AXIS: 76 DEGREES
T AXIS: -6 DEGREES
T AXIS: 64 DEGREES
T OFFSET: 428 MS
T OFFSET: 446 MS
VENTRICULAR RATE: 60 BPM
VENTRICULAR RATE: 67 BPM

## 2024-04-15 ENCOUNTER — APPOINTMENT (OUTPATIENT)
Dept: ORTHOPEDIC SURGERY | Facility: HOSPITAL | Age: 54
End: 2024-04-15
Payer: COMMERCIAL

## 2024-04-15 ENCOUNTER — OFFICE VISIT (OUTPATIENT)
Dept: ORTHOPEDIC SURGERY | Facility: HOSPITAL | Age: 54
End: 2024-04-15
Payer: COMMERCIAL

## 2024-04-15 DIAGNOSIS — M17.11 ARTHRITIS OF RIGHT KNEE: ICD-10-CM

## 2024-04-15 DIAGNOSIS — M17.12 PRIMARY OSTEOARTHRITIS OF LEFT KNEE: Primary | ICD-10-CM

## 2024-04-15 PROCEDURE — 99213 OFFICE O/P EST LOW 20 MIN: CPT | Mod: 25 | Performed by: ORTHOPAEDIC SURGERY

## 2024-04-15 PROCEDURE — 20610 DRAIN/INJ JOINT/BURSA W/O US: CPT | Performed by: ORTHOPAEDIC SURGERY

## 2024-04-15 PROCEDURE — 2500000004 HC RX 250 GENERAL PHARMACY W/ HCPCS (ALT 636 FOR OP/ED): Performed by: ORTHOPAEDIC SURGERY

## 2024-04-15 PROCEDURE — 99213 OFFICE O/P EST LOW 20 MIN: CPT | Performed by: ORTHOPAEDIC SURGERY

## 2024-04-15 PROCEDURE — 2500000005 HC RX 250 GENERAL PHARMACY W/O HCPCS: Performed by: ORTHOPAEDIC SURGERY

## 2024-04-15 PROCEDURE — 20610 DRAIN/INJ JOINT/BURSA W/O US: CPT | Mod: 50 | Performed by: ORTHOPAEDIC SURGERY

## 2024-04-15 RX ORDER — METHYLPREDNISOLONE ACETATE 40 MG/ML
40 INJECTION, SUSPENSION INTRA-ARTICULAR; INTRALESIONAL; INTRAMUSCULAR; SOFT TISSUE
Status: COMPLETED | OUTPATIENT
Start: 2024-04-15 | End: 2024-04-15

## 2024-04-15 RX ORDER — METFORMIN HYDROCHLORIDE 500 MG/1
TABLET ORAL
COMMUNITY
Start: 2024-03-30

## 2024-04-15 RX ORDER — LIDOCAINE HYDROCHLORIDE 10 MG/ML
4 INJECTION INFILTRATION; PERINEURAL
Status: COMPLETED | OUTPATIENT
Start: 2024-04-15 | End: 2024-04-15

## 2024-04-15 RX ADMIN — METHYLPREDNISOLONE ACETATE 40 MG: 40 INJECTION, SUSPENSION INTRA-ARTICULAR; INTRALESIONAL; INTRAMUSCULAR; INTRASYNOVIAL; SOFT TISSUE at 13:02

## 2024-04-15 RX ADMIN — LIDOCAINE HYDROCHLORIDE 4 ML: 10 INJECTION, SOLUTION INFILTRATION; PERINEURAL at 13:02

## 2024-04-15 NOTE — PROGRESS NOTES
Patient returns to see me today for her bilateral knees she has known osteoarthritis in each knee.  She is interested in proceeding with injections past medical history is unchanged as we last saw her.    Their limb is warm, and well-perfused.  They have intact sensation to light touch in all lower extremity dermatomes.  Their quadriceps and hamstring strength is 5 of 5.  Their skin is intact.  Their limb is stable from a ligament standpoint  There is a trace effusion.  There is one out of 3 patellofemoral crepitus    Range of motion is preserved    Successful injection of bilateral knees for osteoarthritis.  Follow-up as needed.    Patient ID: Betzaida Jc is a 54 y.o. female.    L Inj/Asp: bilateral knee on 4/15/2024 1:02 PM  Indications: pain  Details: 22 G needle, anterior approach  Medications (Right): 40 mg methylPREDNISolone acetate 40 mg/mL; 4 mL lidocaine 10 mg/mL (1 %)  Medications (Left): 40 mg methylPREDNISolone acetate 40 mg/mL; 4 mL lidocaine 10 mg/mL (1 %)    Under sterile conditions the left and right knee were injected with 4cc of lidocaine 40mg DepoMedrol.  The patient tolerated the procedure well.  There were no apparent complications.  Sterile bandage was applied.  Procedure, treatment alternatives, risks and benefits explained, specific risks discussed. Consent was given by the patient. Immediately prior to procedure a time out was called to verify the correct patient, procedure, equipment, support staff and site/side marked as required. Patient was prepped and draped in the usual sterile fashion.

## 2024-04-30 ENCOUNTER — APPOINTMENT (OUTPATIENT)
Dept: RADIOLOGY | Facility: HOSPITAL | Age: 54
End: 2024-04-30
Payer: COMMERCIAL

## 2024-04-30 ENCOUNTER — HOSPITAL ENCOUNTER (EMERGENCY)
Facility: HOSPITAL | Age: 54
Discharge: HOME | End: 2024-04-30
Attending: STUDENT IN AN ORGANIZED HEALTH CARE EDUCATION/TRAINING PROGRAM
Payer: COMMERCIAL

## 2024-04-30 ENCOUNTER — APPOINTMENT (OUTPATIENT)
Dept: CARDIOLOGY | Facility: HOSPITAL | Age: 54
End: 2024-04-30
Payer: COMMERCIAL

## 2024-04-30 VITALS
RESPIRATION RATE: 10 BRPM | SYSTOLIC BLOOD PRESSURE: 120 MMHG | DIASTOLIC BLOOD PRESSURE: 67 MMHG | BODY MASS INDEX: 47.2 KG/M2 | TEMPERATURE: 99.7 F | HEIGHT: 65 IN | WEIGHT: 283.29 LBS | OXYGEN SATURATION: 95 % | HEART RATE: 73 BPM

## 2024-04-30 DIAGNOSIS — J18.9 COMMUNITY ACQUIRED PNEUMONIA, UNSPECIFIED LATERALITY: Primary | ICD-10-CM

## 2024-04-30 LAB
ALBUMIN SERPL BCP-MCNC: 3.9 G/DL (ref 3.4–5)
ALP SERPL-CCNC: 57 U/L (ref 33–110)
ALT SERPL W P-5'-P-CCNC: 13 U/L (ref 7–45)
ANION GAP SERPL CALC-SCNC: 13 MMOL/L (ref 10–20)
AST SERPL W P-5'-P-CCNC: 11 U/L (ref 9–39)
ATRIAL RATE: 78 BPM
ATRIAL RATE: 81 BPM
BASOPHILS # BLD AUTO: 0.03 X10*3/UL (ref 0–0.1)
BASOPHILS NFR BLD AUTO: 0.3 %
BILIRUB SERPL-MCNC: 0.4 MG/DL (ref 0–1.2)
BUN SERPL-MCNC: 23 MG/DL (ref 6–23)
CALCIUM SERPL-MCNC: 9 MG/DL (ref 8.6–10.3)
CARDIAC TROPONIN I PNL SERPL HS: 3 NG/L (ref 0–13)
CARDIAC TROPONIN I PNL SERPL HS: 4 NG/L (ref 0–13)
CHLORIDE SERPL-SCNC: 101 MMOL/L (ref 98–107)
CO2 SERPL-SCNC: 30 MMOL/L (ref 21–32)
CREAT SERPL-MCNC: 1.14 MG/DL (ref 0.5–1.05)
EGFRCR SERPLBLD CKD-EPI 2021: 58 ML/MIN/1.73M*2
EOSINOPHIL # BLD AUTO: 0.73 X10*3/UL (ref 0–0.7)
EOSINOPHIL NFR BLD AUTO: 7.2 %
ERYTHROCYTE [DISTWIDTH] IN BLOOD BY AUTOMATED COUNT: 14.2 % (ref 11.5–14.5)
FLUAV RNA RESP QL NAA+PROBE: NOT DETECTED
FLUBV RNA RESP QL NAA+PROBE: NOT DETECTED
GLUCOSE SERPL-MCNC: 107 MG/DL (ref 74–99)
HCT VFR BLD AUTO: 36.1 % (ref 36–46)
HGB BLD-MCNC: 11.5 G/DL (ref 12–16)
IMM GRANULOCYTES # BLD AUTO: 0.02 X10*3/UL (ref 0–0.7)
IMM GRANULOCYTES NFR BLD AUTO: 0.2 % (ref 0–0.9)
LYMPHOCYTES # BLD AUTO: 2.37 X10*3/UL (ref 1.2–4.8)
LYMPHOCYTES NFR BLD AUTO: 23.5 %
MAGNESIUM SERPL-MCNC: 1.62 MG/DL (ref 1.6–2.4)
MCH RBC QN AUTO: 29.3 PG (ref 26–34)
MCHC RBC AUTO-ENTMCNC: 31.9 G/DL (ref 32–36)
MCV RBC AUTO: 92 FL (ref 80–100)
MONOCYTES # BLD AUTO: 1.05 X10*3/UL (ref 0.1–1)
MONOCYTES NFR BLD AUTO: 10.4 %
NEUTROPHILS # BLD AUTO: 5.88 X10*3/UL (ref 1.2–7.7)
NEUTROPHILS NFR BLD AUTO: 58.4 %
NRBC BLD-RTO: ABNORMAL /100{WBCS}
P AXIS: 28 DEGREES
P AXIS: 7 DEGREES
P OFFSET: 188 MS
P OFFSET: 192 MS
P ONSET: 133 MS
P ONSET: 137 MS
PLATELET # BLD AUTO: 247 X10*3/UL (ref 150–450)
POTASSIUM SERPL-SCNC: 3.4 MMOL/L (ref 3.5–5.3)
PR INTERVAL: 168 MS
PR INTERVAL: 174 MS
PROT SERPL-MCNC: 7.1 G/DL (ref 6.4–8.2)
Q ONSET: 220 MS
Q ONSET: 221 MS
QRS COUNT: 13 BEATS
QRS COUNT: 13 BEATS
QRS DURATION: 88 MS
QRS DURATION: 94 MS
QT INTERVAL: 386 MS
QT INTERVAL: 398 MS
QTC CALCULATION(BAZETT): 448 MS
QTC CALCULATION(BAZETT): 453 MS
QTC FREDERICIA: 426 MS
QTC FREDERICIA: 434 MS
R AXIS: -24 DEGREES
R AXIS: -25 DEGREES
RBC # BLD AUTO: 3.92 X10*6/UL (ref 4–5.2)
SARS-COV-2 RNA RESP QL NAA+PROBE: NOT DETECTED
SODIUM SERPL-SCNC: 141 MMOL/L (ref 136–145)
T AXIS: 14 DEGREES
T AXIS: 22 DEGREES
T OFFSET: 414 MS
T OFFSET: 419 MS
VENTRICULAR RATE: 78 BPM
VENTRICULAR RATE: 81 BPM
WBC # BLD AUTO: 10.1 X10*3/UL (ref 4.4–11.3)

## 2024-04-30 PROCEDURE — 85025 COMPLETE CBC W/AUTO DIFF WBC: CPT | Performed by: STUDENT IN AN ORGANIZED HEALTH CARE EDUCATION/TRAINING PROGRAM

## 2024-04-30 PROCEDURE — 36415 COLL VENOUS BLD VENIPUNCTURE: CPT | Performed by: STUDENT IN AN ORGANIZED HEALTH CARE EDUCATION/TRAINING PROGRAM

## 2024-04-30 PROCEDURE — 87636 SARSCOV2 & INF A&B AMP PRB: CPT | Performed by: STUDENT IN AN ORGANIZED HEALTH CARE EDUCATION/TRAINING PROGRAM

## 2024-04-30 PROCEDURE — 99284 EMERGENCY DEPT VISIT MOD MDM: CPT | Mod: 25

## 2024-04-30 PROCEDURE — 71046 X-RAY EXAM CHEST 2 VIEWS: CPT | Performed by: RADIOLOGY

## 2024-04-30 PROCEDURE — 96365 THER/PROPH/DIAG IV INF INIT: CPT

## 2024-04-30 PROCEDURE — 80053 COMPREHEN METABOLIC PANEL: CPT | Performed by: STUDENT IN AN ORGANIZED HEALTH CARE EDUCATION/TRAINING PROGRAM

## 2024-04-30 PROCEDURE — 71046 X-RAY EXAM CHEST 2 VIEWS: CPT

## 2024-04-30 PROCEDURE — 93005 ELECTROCARDIOGRAM TRACING: CPT

## 2024-04-30 PROCEDURE — 83735 ASSAY OF MAGNESIUM: CPT | Performed by: STUDENT IN AN ORGANIZED HEALTH CARE EDUCATION/TRAINING PROGRAM

## 2024-04-30 PROCEDURE — 2500000004 HC RX 250 GENERAL PHARMACY W/ HCPCS (ALT 636 FOR OP/ED): Performed by: STUDENT IN AN ORGANIZED HEALTH CARE EDUCATION/TRAINING PROGRAM

## 2024-04-30 PROCEDURE — 2500000001 HC RX 250 WO HCPCS SELF ADMINISTERED DRUGS (ALT 637 FOR MEDICARE OP): Performed by: STUDENT IN AN ORGANIZED HEALTH CARE EDUCATION/TRAINING PROGRAM

## 2024-04-30 PROCEDURE — 96375 TX/PRO/DX INJ NEW DRUG ADDON: CPT

## 2024-04-30 PROCEDURE — 84484 ASSAY OF TROPONIN QUANT: CPT | Performed by: STUDENT IN AN ORGANIZED HEALTH CARE EDUCATION/TRAINING PROGRAM

## 2024-04-30 RX ORDER — KETOROLAC TROMETHAMINE 15 MG/ML
15 INJECTION, SOLUTION INTRAMUSCULAR; INTRAVENOUS ONCE
Status: COMPLETED | OUTPATIENT
Start: 2024-04-30 | End: 2024-04-30

## 2024-04-30 RX ORDER — DOXYCYCLINE 100 MG/1
100 CAPSULE ORAL ONCE
Status: COMPLETED | OUTPATIENT
Start: 2024-04-30 | End: 2024-04-30

## 2024-04-30 RX ORDER — DOXYCYCLINE 100 MG/1
100 CAPSULE ORAL 2 TIMES DAILY
Qty: 10 CAPSULE | Refills: 0 | Status: SHIPPED | OUTPATIENT
Start: 2024-04-30 | End: 2024-05-05

## 2024-04-30 RX ORDER — BENZONATATE 100 MG/1
200 CAPSULE ORAL 3 TIMES DAILY PRN
Qty: 42 CAPSULE | Refills: 0 | Status: SHIPPED | OUTPATIENT
Start: 2024-04-30 | End: 2024-05-07

## 2024-04-30 RX ORDER — NAPROXEN SODIUM 220 MG/1
324 TABLET, FILM COATED ORAL ONCE
Status: COMPLETED | OUTPATIENT
Start: 2024-04-30 | End: 2024-04-30

## 2024-04-30 RX ORDER — ORPHENADRINE CITRATE 30 MG/ML
60 INJECTION INTRAMUSCULAR; INTRAVENOUS ONCE
Status: COMPLETED | OUTPATIENT
Start: 2024-04-30 | End: 2024-04-30

## 2024-04-30 RX ORDER — CEFDINIR 300 MG/1
300 CAPSULE ORAL 2 TIMES DAILY
Qty: 10 CAPSULE | Refills: 0 | Status: SHIPPED | OUTPATIENT
Start: 2024-04-30 | End: 2024-05-05

## 2024-04-30 RX ORDER — BENZONATATE 100 MG/1
200 CAPSULE ORAL ONCE
Status: COMPLETED | OUTPATIENT
Start: 2024-04-30 | End: 2024-04-30

## 2024-04-30 RX ORDER — CEFTRIAXONE 1 G/50ML
1 INJECTION, SOLUTION INTRAVENOUS ONCE
Status: COMPLETED | OUTPATIENT
Start: 2024-04-30 | End: 2024-04-30

## 2024-04-30 RX ADMIN — ASPIRIN 324 MG: 81 TABLET, CHEWABLE ORAL at 00:30

## 2024-04-30 RX ADMIN — DOXYCYCLINE HYCLATE 100 MG: 100 CAPSULE ORAL at 02:16

## 2024-04-30 RX ADMIN — ORPHENADRINE CITRATE 60 MG: 60 INJECTION INTRAMUSCULAR; INTRAVENOUS at 01:45

## 2024-04-30 RX ADMIN — CEFTRIAXONE 1 G: 1 INJECTION, SOLUTION INTRAVENOUS at 02:16

## 2024-04-30 RX ADMIN — BENZONATATE 200 MG: 100 CAPSULE ORAL at 01:45

## 2024-04-30 RX ADMIN — KETOROLAC TROMETHAMINE 15 MG: 15 INJECTION, SOLUTION INTRAMUSCULAR; INTRAVENOUS at 00:53

## 2024-04-30 ASSESSMENT — COLUMBIA-SUICIDE SEVERITY RATING SCALE - C-SSRS
6. HAVE YOU EVER DONE ANYTHING, STARTED TO DO ANYTHING, OR PREPARED TO DO ANYTHING TO END YOUR LIFE?: NO
1. IN THE PAST MONTH, HAVE YOU WISHED YOU WERE DEAD OR WISHED YOU COULD GO TO SLEEP AND NOT WAKE UP?: NO
2. HAVE YOU ACTUALLY HAD ANY THOUGHTS OF KILLING YOURSELF?: NO

## 2024-04-30 ASSESSMENT — PAIN SCALES - GENERAL
PAINLEVEL_OUTOF10: 9
PAINLEVEL_OUTOF10: 9

## 2024-04-30 ASSESSMENT — PAIN - FUNCTIONAL ASSESSMENT: PAIN_FUNCTIONAL_ASSESSMENT: 0-10

## 2024-04-30 ASSESSMENT — PAIN DESCRIPTION - LOCATION: LOCATION: CHEST

## 2024-04-30 NOTE — ED PROVIDER NOTES
"HPI   Chief Complaint   Patient presents with    Chest Pain     Pt states that at 11pm she was woken up by a cramping pain in her chest. States she also feels achy in her neck and her legs. Pt states she's been coughing (non productive) for roughly 3 days and noticed SOB on exertion that started today       This is a 54-year-old female who presents to the ED with chest pain that woke her from sleep.  Describes it as a cramping sensation throughout her entire chest and cannot specify the location.  Pain was radiating up her right neck and down her right leg.  States that she has been compliant with her Eliquis.  She has had a nonproductive cough for the past 3 days and also complains of dyspnea with exertion.  Additionally complains of myalgias.                          Iman Coma Scale Score: 15                     Patient History   Past Medical History:   Diagnosis Date    Asthma ([facility])     \"Severe persistent asthma\" per pulmonology    Chest pain     Numerous work ups for CP.    Chronic allergic rhinitis     Chronic constipation     Collapse of right lung     Chronic RML collapse with associated bronchiectasis, seen on CT    Dependence on nocturnal oxygen therapy     2-3 L O2 nightly    Diverticulosis     Hiatal hernia with GERD     4cm per EGD 2023    History of venous thromboembolism 2010    6-8 weeks post L Achilles repair, LE DVT and unilateral PE. Treated w/ anticoagulation, then found to have possible PE in other lung    Hyperlipidemia     Hypertension     Migraine     Morbid obesity with BMI of 45.0-49.9, adult (Multi)     BRIDGER (obstructive sleep apnea)     partially CPAP compliant w/ 2-3L O2 bleed in    Paroxysmal atrial fibrillation (Multi)     Right renal stone     6 mm stone midpole caliceal system right kidney without obstruction, last seen 2023     Past Surgical History:   Procedure Laterality Date    ACHILLES TENDON SURGERY Left 2010    BI CORE NEEDLE BIOPSY RIGHT       " SECTION, CLASSIC      COLONOSCOPY      CT ANGIO CORONARY ART WITH HEARTFLOW IF SCORE >30%  11/18/2022    CT HEART CORONARY ANGIOGRAM 11/18/2022 DOCTOR OFFICE LEGACY    ESOPHAGOSCOPY / EGD  08/30/2023    KNEE ARTHROSCOPY W/ DEBRIDEMENT Right     MR HEAD ANGIO WO IV CONTRAST  04/05/2019    MR HEAD ANGIO WO IV CONTRAST 4/5/2019 [facility] ANCILLARY LEGACY    MR NECK ANGIO WO IV CONTRAST  04/05/2019    [place]ECK [place]GIO [place] [facility] [place]    PARTIAL HYSTERECTOMY  11/30/2006    fibroids/bleeding    SHOULDER ARTHROSCOPY W/ ROTATOR CUFF REPAIR Left 12/30/2020    TOE SURGERY Right 02/17/2023    Digital arthroplasties with pin fixation, toes 2, 3, right foot.     Family History   Problem Relation Name Age of Onset    Diabetes Other      Hypertension Other      Stroke Other       Social History     Tobacco Use    Smoking status: Never    Smokeless tobacco: Never   Vaping Use    Vaping status: Never Used   Substance Use Topics    Alcohol use: Yes     Alcohol/week: 2.0 standard drinks of alcohol     Types: 2 Standard drinks or equivalent per week    Drug use: Never       Physical Exam   ED Triage Vitals   Temperature Heart Rate Respirations BP   04/30/24 0028 04/30/24 0020 04/30/24 0020 04/30/24 0020   37.6 °C (99.7 °F) 84 18 121/76      Pulse Ox Temp Source Heart Rate Source Patient Position   04/30/24 0020 04/30/24 0028 04/30/24 0020 --   96 % Oral Monitor       BP Location FiO2 (%)     04/30/24 0020 --     Left arm        Physical Exam  Vitals and nursing note reviewed.   Constitutional:       General: She is not in acute distress.     Appearance: She is well-developed.   HENT:      Head: Normocephalic and atraumatic.   Eyes:      Conjunctiva/sclera: Conjunctivae normal.   Cardiovascular:      Rate and Rhythm: Normal rate and regular rhythm.      Heart sounds: No murmur heard.  Pulmonary:      Effort: Pulmonary effort is normal. No tachypnea, accessory muscle usage or respiratory distress.      Breath sounds:  Examination of the left-lower field reveals wheezing. Wheezing present. No rhonchi or rales.   Abdominal:      Palpations: Abdomen is soft.      Tenderness: There is no abdominal tenderness.   Musculoskeletal:         General: No swelling.      Cervical back: Neck supple.   Skin:     General: Skin is warm and dry.   Neurological:      General: No focal deficit present.      Mental Status: She is alert and oriented to person, place, and time.   Psychiatric:         Mood and Affect: Mood normal.         Behavior: Behavior normal.     XR chest 2 views    Result Date: 4/30/2024  Interpreted By:  Hernandez Del Cid, STUDY: XR CHEST 2 VIEWS;  4/30/2024 12:18 am   INDICATION: Signs/Symptoms:Chest Pain.   COMPARISON: Chest radiograph 03/30/2024   ACCESSION NUMBER(S): [id number]   ORDERING CLINICIAN: ANDREEA STEEL   FINDINGS: Borderline cardiomegaly and mild central vascular congestion. Mild interstitial prominence. Patchy right perihilar and right infrahilar opacities. No large pleural effusion or discernible pneumothorax. No acute osseous abnormality of the foot.       1. Cardiomegaly with central vascular congestion and scattered patchy opacities which may represent a combination of atelectasis and edema versus infiltrate.     Signed by: Hernandez Del Cid 4/30/2024 1:33 AM Dictation workstation:   [id number]   Results for orders placed or performed during the hospital encounter of 04/30/24 (from the past 24 hour(s))   CBC and Auto Differential   Result Value Ref Range    WBC 10.1 4.4 - 11.3 x10*3/uL    nRBC      RBC 3.92 (L) 4.00 - 5.20 x10*6/uL    Hemoglobin 11.5 (L) 12.0 - 16.0 g/dL    Hematocrit 36.1 36.0 - 46.0 %    MCV 92 80 - 100 fL    MCH 29.3 26.0 - 34.0 pg    MCHC 31.9 (L) 32.0 - 36.0 g/dL    RDW 14.2 11.5 - 14.5 %    Platelets 247 150 - 450 x10*3/uL    Neutrophils % 58.4 40.0 - 80.0 %    Immature Granulocytes %, Automated 0.2 0.0 - 0.9 %    Lymphocytes % 23.5 13.0 - 44.0 %    Monocytes % 10.4 2.0 - 10.0 %     Eosinophils % 7.2 0.0 - 6.0 %    Basophils % 0.3 0.0 - 2.0 %    Neutrophils Absolute 5.88 1.20 - 7.70 x10*3/uL    Immature Granulocytes Absolute, Automated 0.02 0.00 - 0.70 x10*3/uL    Lymphocytes Absolute 2.37 1.20 - 4.80 x10*3/uL    Monocytes Absolute 1.05 (H) 0.10 - 1.00 x10*3/uL    Eosinophils Absolute 0.73 (H) 0.00 - 0.70 x10*3/uL    Basophils Absolute 0.03 0.00 - 0.10 x10*3/uL   Comprehensive Metabolic Panel   Result Value Ref Range    Glucose 107 (H) 74 - 99 mg/dL    Sodium 141 136 - 145 mmol/L    Potassium 3.4 (L) 3.5 - 5.3 mmol/L    Chloride 101 98 - 107 mmol/L    Bicarbonate 30 21 - 32 mmol/L    Anion Gap 13 10 - 20 mmol/L    Urea Nitrogen 23 6 - 23 mg/dL    Creatinine 1.14 (H) 0.50 - 1.05 mg/dL    eGFR 58 (L) >60 mL/min/1.73m*2    Calcium 9.0 8.6 - 10.3 mg/dL    Albumin 3.9 3.4 - 5.0 g/dL    Alkaline Phosphatase 57 33 - 110 U/L    Total Protein 7.1 6.4 - 8.2 g/dL    AST 11 9 - 39 U/L    Bilirubin, Total 0.4 0.0 - 1.2 mg/dL    ALT 13 7 - 45 U/L   Magnesium   Result Value Ref Range    Magnesium 1.62 1.60 - 2.40 mg/dL   Troponin I, High Sensitivity, Initial   Result Value Ref Range    Troponin I, High Sensitivity 4 0 - 13 ng/L   Sars-CoV-2 PCR   Result Value Ref Range    Coronavirus 2019, PCR Not Detected Not Detected   Influenza A, and B PCR   Result Value Ref Range    Flu A Result Not Detected Not Detected    Flu B Result Not Detected Not Detected   Troponin, High Sensitivity, 1 Hour   Result Value Ref Range    Troponin I, High Sensitivity 3 0 - 13 ng/L         ED Course & MDM   Diagnoses as of 04/30/24 0210   Community acquired pneumonia, unspecified laterality       Medical Decision Making  Patient presented to the ED with cramp-like chest pain in addition to cough, myalgias, and dyspnea for the past 3 days.  COVID/influenza swabs were negative.  EKG was unchanged from baseline.  Troponin was negative x 2.  Patient was given aspirin on arrival to the ED.  Vital signs are stable.  Labs were unremarkable.   Chest x-ray showed central vascular congestion with scattered patchy opacities, differential including atelectasis/edema/infiltrate.  Given her overall clinical picture, feel that she likely has developing infiltrate and so she was started on Rocephin and doxycycline due to her allergies and also home medication drug interactions.  She will be discharged with 5 days of cefdinir and doxycycline.             Blanca Hernandez MD  04/30/24 0216

## 2024-04-30 NOTE — Clinical Note
Betzaida Jc was seen and treated in our emergency department on 4/30/2024.  She may return to work on 05/06/2024.       If you have any questions or concerns, please don't hesitate to call.      Blanca Hernandez MD

## 2024-05-30 ENCOUNTER — HOSPITAL ENCOUNTER (EMERGENCY)
Facility: HOSPITAL | Age: 54
Discharge: HOME | End: 2024-05-30
Attending: EMERGENCY MEDICINE
Payer: COMMERCIAL

## 2024-05-30 ENCOUNTER — APPOINTMENT (OUTPATIENT)
Dept: CARDIOLOGY | Facility: HOSPITAL | Age: 54
End: 2024-05-30
Payer: COMMERCIAL

## 2024-05-30 ENCOUNTER — APPOINTMENT (OUTPATIENT)
Dept: RADIOLOGY | Facility: HOSPITAL | Age: 54
End: 2024-05-30
Payer: COMMERCIAL

## 2024-05-30 VITALS
TEMPERATURE: 99.5 F | HEIGHT: 65 IN | WEIGHT: 277 LBS | DIASTOLIC BLOOD PRESSURE: 66 MMHG | SYSTOLIC BLOOD PRESSURE: 111 MMHG | HEART RATE: 88 BPM | OXYGEN SATURATION: 98 % | RESPIRATION RATE: 22 BRPM | BODY MASS INDEX: 46.15 KG/M2

## 2024-05-30 DIAGNOSIS — U07.1 COVID: Primary | ICD-10-CM

## 2024-05-30 LAB
ALBUMIN SERPL BCP-MCNC: 4.3 G/DL (ref 3.4–5)
ALP SERPL-CCNC: 54 U/L (ref 33–110)
ALT SERPL W P-5'-P-CCNC: 14 U/L (ref 7–45)
ANION GAP SERPL CALC-SCNC: 16 MMOL/L (ref 10–20)
APPEARANCE UR: CLEAR
AST SERPL W P-5'-P-CCNC: 14 U/L (ref 9–39)
BASOPHILS # BLD AUTO: 0.03 X10*3/UL (ref 0–0.1)
BASOPHILS NFR BLD AUTO: 0.4 %
BILIRUB SERPL-MCNC: 0.5 MG/DL (ref 0–1.2)
BILIRUB UR STRIP.AUTO-MCNC: NEGATIVE MG/DL
BUN SERPL-MCNC: 21 MG/DL (ref 6–23)
CALCIUM SERPL-MCNC: 9.5 MG/DL (ref 8.6–10.3)
CARDIAC TROPONIN I PNL SERPL HS: 3 NG/L (ref 0–13)
CHLORIDE SERPL-SCNC: 97 MMOL/L (ref 98–107)
CO2 SERPL-SCNC: 27 MMOL/L (ref 21–32)
COLOR UR: YELLOW
CREAT SERPL-MCNC: 1.11 MG/DL (ref 0.5–1.05)
EGFRCR SERPLBLD CKD-EPI 2021: 60 ML/MIN/1.73M*2
EOSINOPHIL # BLD AUTO: 0.11 X10*3/UL (ref 0–0.7)
EOSINOPHIL NFR BLD AUTO: 1.6 %
ERYTHROCYTE [DISTWIDTH] IN BLOOD BY AUTOMATED COUNT: 14.9 % (ref 11.5–14.5)
FLUAV RNA RESP QL NAA+PROBE: NOT DETECTED
FLUBV RNA RESP QL NAA+PROBE: NOT DETECTED
GLUCOSE SERPL-MCNC: 89 MG/DL (ref 74–99)
GLUCOSE UR STRIP.AUTO-MCNC: NEGATIVE MG/DL
HCT VFR BLD AUTO: 38 % (ref 36–46)
HGB BLD-MCNC: 12 G/DL (ref 12–16)
HOLD SPECIMEN: NORMAL
IMM GRANULOCYTES # BLD AUTO: 0.03 X10*3/UL (ref 0–0.7)
IMM GRANULOCYTES NFR BLD AUTO: 0.4 % (ref 0–0.9)
KETONES UR STRIP.AUTO-MCNC: NEGATIVE MG/DL
LACTATE SERPL-SCNC: 2 MMOL/L (ref 0.4–2)
LEUKOCYTE ESTERASE UR QL STRIP.AUTO: NEGATIVE
LYMPHOCYTES # BLD AUTO: 0.73 X10*3/UL (ref 1.2–4.8)
LYMPHOCYTES NFR BLD AUTO: 10.9 %
MCH RBC QN AUTO: 28.9 PG (ref 26–34)
MCHC RBC AUTO-ENTMCNC: 31.6 G/DL (ref 32–36)
MCV RBC AUTO: 92 FL (ref 80–100)
MONOCYTES # BLD AUTO: 0.94 X10*3/UL (ref 0.1–1)
MONOCYTES NFR BLD AUTO: 14 %
NEUTROPHILS # BLD AUTO: 4.88 X10*3/UL (ref 1.2–7.7)
NEUTROPHILS NFR BLD AUTO: 72.7 %
NITRITE UR QL STRIP.AUTO: NEGATIVE
NRBC BLD-RTO: ABNORMAL /100{WBCS}
PH UR STRIP.AUTO: 6 [PH]
PLATELET # BLD AUTO: 292 X10*3/UL (ref 150–450)
POTASSIUM SERPL-SCNC: 3.4 MMOL/L (ref 3.5–5.3)
PROT SERPL-MCNC: 7.7 G/DL (ref 6.4–8.2)
PROT UR STRIP.AUTO-MCNC: NEGATIVE MG/DL
RBC # BLD AUTO: 4.15 X10*6/UL (ref 4–5.2)
RBC # UR STRIP.AUTO: ABNORMAL /UL
RBC #/AREA URNS AUTO: NORMAL /HPF
RSV RNA RESP QL NAA+PROBE: NOT DETECTED
SARS-COV-2 RNA RESP QL NAA+PROBE: DETECTED
SODIUM SERPL-SCNC: 137 MMOL/L (ref 136–145)
SP GR UR STRIP.AUTO: 1.01
SQUAMOUS #/AREA URNS AUTO: NORMAL /HPF
UROBILINOGEN UR STRIP.AUTO-MCNC: 0.2 MG/DL
WBC # BLD AUTO: 6.7 X10*3/UL (ref 4.4–11.3)
WBC #/AREA URNS AUTO: NORMAL /HPF

## 2024-05-30 PROCEDURE — 99284 EMERGENCY DEPT VISIT MOD MDM: CPT | Mod: 25

## 2024-05-30 PROCEDURE — 80053 COMPREHEN METABOLIC PANEL: CPT | Performed by: EMERGENCY MEDICINE

## 2024-05-30 PROCEDURE — 93005 ELECTROCARDIOGRAM TRACING: CPT

## 2024-05-30 PROCEDURE — 2500000004 HC RX 250 GENERAL PHARMACY W/ HCPCS (ALT 636 FOR OP/ED): Performed by: EMERGENCY MEDICINE

## 2024-05-30 PROCEDURE — 2500000002 HC RX 250 W HCPCS SELF ADMINISTERED DRUGS (ALT 637 FOR MEDICARE OP, ALT 636 FOR OP/ED): Performed by: EMERGENCY MEDICINE

## 2024-05-30 PROCEDURE — 36415 COLL VENOUS BLD VENIPUNCTURE: CPT | Performed by: EMERGENCY MEDICINE

## 2024-05-30 PROCEDURE — 81001 URINALYSIS AUTO W/SCOPE: CPT | Performed by: EMERGENCY MEDICINE

## 2024-05-30 PROCEDURE — 84484 ASSAY OF TROPONIN QUANT: CPT | Performed by: EMERGENCY MEDICINE

## 2024-05-30 PROCEDURE — 96361 HYDRATE IV INFUSION ADD-ON: CPT

## 2024-05-30 PROCEDURE — 83605 ASSAY OF LACTIC ACID: CPT | Performed by: EMERGENCY MEDICINE

## 2024-05-30 PROCEDURE — 85025 COMPLETE CBC W/AUTO DIFF WBC: CPT | Performed by: EMERGENCY MEDICINE

## 2024-05-30 PROCEDURE — 71046 X-RAY EXAM CHEST 2 VIEWS: CPT | Performed by: RADIOLOGY

## 2024-05-30 PROCEDURE — 2500000001 HC RX 250 WO HCPCS SELF ADMINISTERED DRUGS (ALT 637 FOR MEDICARE OP): Performed by: EMERGENCY MEDICINE

## 2024-05-30 PROCEDURE — 71046 X-RAY EXAM CHEST 2 VIEWS: CPT

## 2024-05-30 PROCEDURE — 96374 THER/PROPH/DIAG INJ IV PUSH: CPT

## 2024-05-30 PROCEDURE — 87502 INFLUENZA DNA AMP PROBE: CPT | Performed by: EMERGENCY MEDICINE

## 2024-05-30 PROCEDURE — 94640 AIRWAY INHALATION TREATMENT: CPT

## 2024-05-30 RX ORDER — IPRATROPIUM BROMIDE AND ALBUTEROL SULFATE 2.5; .5 MG/3ML; MG/3ML
3 SOLUTION RESPIRATORY (INHALATION) ONCE
Status: COMPLETED | OUTPATIENT
Start: 2024-05-30 | End: 2024-05-30

## 2024-05-30 RX ORDER — ACETAMINOPHEN 325 MG/1
975 TABLET ORAL ONCE
Status: COMPLETED | OUTPATIENT
Start: 2024-05-30 | End: 2024-05-30

## 2024-05-30 RX ORDER — POTASSIUM CHLORIDE 1.5 G/1.58G
40 POWDER, FOR SOLUTION ORAL ONCE
Status: COMPLETED | OUTPATIENT
Start: 2024-05-30 | End: 2024-05-30

## 2024-05-30 RX ORDER — TIRZEPATIDE 2.5 MG/.5ML
2.5 INJECTION, SOLUTION SUBCUTANEOUS
COMMUNITY

## 2024-05-30 RX ADMIN — SODIUM CHLORIDE 1000 ML: 900 INJECTION, SOLUTION INTRAVENOUS at 16:16

## 2024-05-30 RX ADMIN — SODIUM CHLORIDE 1000 ML: 900 INJECTION, SOLUTION INTRAVENOUS at 17:38

## 2024-05-30 RX ADMIN — METHYLPREDNISOLONE SODIUM SUCCINATE 125 MG: 125 INJECTION, POWDER, FOR SOLUTION INTRAMUSCULAR; INTRAVENOUS at 17:37

## 2024-05-30 RX ADMIN — IPRATROPIUM BROMIDE AND ALBUTEROL SULFATE 3 ML: .5; 2.5 SOLUTION RESPIRATORY (INHALATION) at 17:38

## 2024-05-30 RX ADMIN — Medication 2 SPRAY: at 17:39

## 2024-05-30 RX ADMIN — POTASSIUM CHLORIDE 40 MEQ: 1.5 POWDER, FOR SOLUTION ORAL at 17:39

## 2024-05-30 RX ADMIN — ACETAMINOPHEN 975 MG: 325 TABLET ORAL at 16:18

## 2024-05-30 ASSESSMENT — PAIN DESCRIPTION - DESCRIPTORS: DESCRIPTORS: THROBBING

## 2024-05-30 ASSESSMENT — PAIN SCALES - GENERAL
PAINLEVEL_OUTOF10: 10 - WORST POSSIBLE PAIN

## 2024-05-30 ASSESSMENT — PAIN DESCRIPTION - PAIN TYPE: TYPE: ACUTE PAIN

## 2024-05-30 ASSESSMENT — PAIN DESCRIPTION - ORIENTATION: ORIENTATION: MID

## 2024-05-30 ASSESSMENT — PAIN DESCRIPTION - PROGRESSION: CLINICAL_PROGRESSION: NOT CHANGED

## 2024-05-30 ASSESSMENT — PAIN DESCRIPTION - FREQUENCY: FREQUENCY: CONSTANT/CONTINUOUS

## 2024-05-30 ASSESSMENT — PAIN DESCRIPTION - LOCATION: LOCATION: CHEST

## 2024-05-30 ASSESSMENT — PAIN DESCRIPTION - ONSET: ONSET: AWAKENED FROM SLEEP

## 2024-05-30 ASSESSMENT — PAIN - FUNCTIONAL ASSESSMENT
PAIN_FUNCTIONAL_ASSESSMENT: 0-10
PAIN_FUNCTIONAL_ASSESSMENT: 0-10

## 2024-05-30 NOTE — DISCHARGE INSTRUCTIONS
Continue taking your prednisone and using your inhaler at home  Continue all your other medication  Increase the amount of fluid you drink to at least 8 ounces every hour  Tylenol alternating with Motrin for fever

## 2024-05-30 NOTE — LETTER
May 30, 2024    Patient: Betzaida Jc   YOB: 1970   Date of Visit: 5/30/2024       To Whom It May Concern:    Betzaida Jc was seen and treated in our emergency department on 5/30/2024. Pt may return to work if symptoms cleared 6/4/25    If you have any questions or concerns, please don't hesitate to call.              CC:   No Recipients

## 2024-05-30 NOTE — ED PROVIDER NOTES
"HPI   Chief Complaint   Patient presents with    Chest Pain     Female patient presents to the ED with complaints of midsternal chest pain non-radiating with nausea, chills, dry cough, fever, diarrhea, numbness and tingling in b/l upper and lower extremities. No neurodeficits upon exam. She states her symptoms started yesterday. She denies having any abdominal pain or urinary changes.        HPI  54-year-old female comes emergency room with a chief complaint of fever and midsternal chest pain.  She has a dry nonproductive cough.  Some nausea no vomiting or diarrhea  Complains of full body myalgia                  No data recorded                   Patient History   Past Medical History:   Diagnosis Date    Asthma ([facility]     \"Severe persistent asthma\" per pulmonology    Chest pain     Numerous work ups for CP.    Chronic allergic rhinitis     Chronic constipation     Collapse of right lung     Chronic RML collapse with associated bronchiectasis, seen on CT    Dependence on nocturnal oxygen therapy     2-3 L O2 nightly    Diverticulosis     Hiatal hernia with GERD     4cm per EGD 2023    History of venous thromboembolism 2010    6-8 weeks post L Achilles repair, LE DVT and unilateral PE. Treated w/ anticoagulation, then found to have possible PE in other lung    Hyperlipidemia     Hypertension     Migraine     Morbid obesity with BMI of 45.0-49.9, adult (Multi)     BRIDGER (obstructive sleep apnea)     partially CPAP compliant w/ 2-3L O2 bleed in    Paroxysmal atrial fibrillation (Multi)     Right renal stone     6 mm stone midpole caliceal system right kidney without obstruction, last seen 2023     Past Surgical History:   Procedure Laterality Date    ACHILLES TENDON SURGERY Left 2010    BI BREAST RIGHT CORE NEEDLE BIOPSY       SECTION, CLASSIC      COLONOSCOPY      CT ANGIO CORONARY ART WITH HEARTFLOW IF SCORE >30%  2022    CT HEART CORONARY ANGIOGRAM 2022 DOCTOR OFFICE LEGACY    " ESOPHAGOSCOPY / EGD  08/30/2023    KNEE ARTHROSCOPY W/ DEBRIDEMENT Right     MR HEAD ANGIO WO IV CONTRAST  04/05/2019    MR HEAD ANGIO WO IV CONTRAST [place] [facility] [place]    MR NECK ANGIO WO IV CONTRAST  04/05/2019    MR NECK ANGIO WO IV CONTRAST [place] [facility] [place]    PARTIAL HYSTERECTOMY  11/30/2006    fibroids/bleeding    SHOULDER ARTHROSCOPY W/ ROTATOR CUFF REPAIR Left 12/30/2020    TOE SURGERY Right 02/17/2023    Digital arthroplasties with pin fixation, toes 2, 3, right foot.     Family History   Problem Relation Name Age of Onset    Diabetes Other      Hypertension Other      Stroke Other       Social History     Tobacco Use    Smoking status: Never    Smokeless tobacco: Never   Vaping Use    Vaping status: Never Used   Substance Use Topics    Alcohol use: Yes     Alcohol/week: 2.0 standard drinks of alcohol     Types: 2 Standard drinks or equivalent per week    Drug use: Never       Physical Exam   ED Triage Vitals [05/30/24 1605]   Temperature Heart Rate Respirations BP   (!) 38.8 °C (101.8 °F) 99 18 113/75      Pulse Ox Temp Source Heart Rate Source Patient Position   98 % Oral Monitor Lying      BP Location FiO2 (%)     Left arm --       Physical Exam  Patient alert ill-appearing  HEENT negative  Lungs are clear  Cardiac is regular rate and rhythm  Abdomen is benign  Extremities without rash or edema  ED Course & MDM   Diagnoses as of 05/30/24 1800   COVID       Medical Decision Making  Patient tested positive for COVID chest x-ray is clear vital signs are stable she has responded to Tylenol for her fever; she has been given 2 L of saline and IV Solu-Medrol  She will be discharged home with no meds since she is on p.o. prednisone at home and she has DuoNeb nebulizer  Follow-up with her PCP she is medically stable for discharge    Procedure  Procedures     Fern Lima MD  05/30/24 0752

## 2024-05-31 LAB
ATRIAL RATE: 100 BPM
P AXIS: -7 DEGREES
P OFFSET: 203 MS
P ONSET: 152 MS
PR INTERVAL: 130 MS
Q ONSET: 217 MS
QRS COUNT: 16 BEATS
QRS DURATION: 90 MS
QT INTERVAL: 342 MS
QTC CALCULATION(BAZETT): 441 MS
QTC FREDERICIA: 405 MS
R AXIS: -11 DEGREES
T AXIS: 17 DEGREES
T OFFSET: 388 MS
VENTRICULAR RATE: 100 BPM

## 2024-06-07 ENCOUNTER — HOSPITAL ENCOUNTER (OUTPATIENT)
Dept: RADIOLOGY | Facility: CLINIC | Age: 54
Discharge: HOME | End: 2024-06-07
Payer: COMMERCIAL

## 2024-06-07 DIAGNOSIS — R91.1 SOLITARY PULMONARY NODULE: ICD-10-CM

## 2024-06-07 PROCEDURE — 71250 CT THORAX DX C-: CPT

## 2024-07-02 ENCOUNTER — LAB (OUTPATIENT)
Dept: LAB | Facility: LAB | Age: 54
End: 2024-07-02
Payer: COMMERCIAL

## 2024-07-02 DIAGNOSIS — I11.9 HYPERTENSIVE HEART DISEASE WITHOUT HEART FAILURE: ICD-10-CM

## 2024-07-02 DIAGNOSIS — E11.8 TYPE 2 DIABETES MELLITUS WITH UNSPECIFIED COMPLICATIONS (MULTI): ICD-10-CM

## 2024-07-02 DIAGNOSIS — E11.8 TYPE 2 DIABETES MELLITUS WITH UNSPECIFIED COMPLICATIONS (MULTI): Primary | ICD-10-CM

## 2024-07-02 DIAGNOSIS — E78.5 HYPERLIPIDEMIA, UNSPECIFIED: ICD-10-CM

## 2024-07-02 LAB
ALBUMIN SERPL BCP-MCNC: 4 G/DL (ref 3.4–5)
ALP SERPL-CCNC: 56 U/L (ref 33–110)
ALT SERPL W P-5'-P-CCNC: 18 U/L (ref 7–45)
ANION GAP SERPL CALC-SCNC: 13 MMOL/L (ref 10–20)
AST SERPL W P-5'-P-CCNC: 16 U/L (ref 9–39)
BILIRUB SERPL-MCNC: 0.5 MG/DL (ref 0–1.2)
BUN SERPL-MCNC: 18 MG/DL (ref 6–23)
CALCIUM SERPL-MCNC: 9.1 MG/DL (ref 8.6–10.6)
CHLORIDE SERPL-SCNC: 103 MMOL/L (ref 98–107)
CO2 SERPL-SCNC: 29 MMOL/L (ref 21–32)
CREAT SERPL-MCNC: 0.93 MG/DL (ref 0.5–1.05)
EGFRCR SERPLBLD CKD-EPI 2021: 74 ML/MIN/1.73M*2
EST. AVERAGE GLUCOSE BLD GHB EST-MCNC: 131 MG/DL
GLUCOSE SERPL-MCNC: 120 MG/DL (ref 74–99)
HBA1C MFR BLD: 6.2 %
POTASSIUM SERPL-SCNC: 3.9 MMOL/L (ref 3.5–5.3)
PROT SERPL-MCNC: 6.5 G/DL (ref 6.4–8.2)
SODIUM SERPL-SCNC: 141 MMOL/L (ref 136–145)

## 2024-07-02 PROCEDURE — 36415 COLL VENOUS BLD VENIPUNCTURE: CPT

## 2024-07-02 PROCEDURE — 80053 COMPREHEN METABOLIC PANEL: CPT

## 2024-07-02 PROCEDURE — 83036 HEMOGLOBIN GLYCOSYLATED A1C: CPT

## 2024-08-09 ENCOUNTER — OFFICE VISIT (OUTPATIENT)
Dept: ORTHOPEDIC SURGERY | Facility: CLINIC | Age: 54
End: 2024-08-09
Payer: COMMERCIAL

## 2024-08-09 ENCOUNTER — OFFICE VISIT (OUTPATIENT)
Dept: PULMONOLOGY | Facility: CLINIC | Age: 54
End: 2024-08-09
Payer: COMMERCIAL

## 2024-08-09 VITALS
OXYGEN SATURATION: 97 % | DIASTOLIC BLOOD PRESSURE: 71 MMHG | HEIGHT: 66 IN | RESPIRATION RATE: 18 BRPM | HEART RATE: 84 BPM | TEMPERATURE: 98.1 F | WEIGHT: 263 LBS | SYSTOLIC BLOOD PRESSURE: 107 MMHG | BODY MASS INDEX: 42.27 KG/M2

## 2024-08-09 VITALS — WEIGHT: 260 LBS | BODY MASS INDEX: 43.27 KG/M2

## 2024-08-09 DIAGNOSIS — G47.33 OSA (OBSTRUCTIVE SLEEP APNEA): ICD-10-CM

## 2024-08-09 DIAGNOSIS — J45.50 SEVERE PERSISTENT ASTHMA, UNSPECIFIED WHETHER COMPLICATED (MULTI): Primary | ICD-10-CM

## 2024-08-09 DIAGNOSIS — M17.12 UNILATERAL PRIMARY OSTEOARTHRITIS, LEFT KNEE: Primary | ICD-10-CM

## 2024-08-09 PROCEDURE — 99205 OFFICE O/P NEW HI 60 MIN: CPT | Performed by: INTERNAL MEDICINE

## 2024-08-09 PROCEDURE — 99215 OFFICE O/P EST HI 40 MIN: CPT | Performed by: INTERNAL MEDICINE

## 2024-08-09 PROCEDURE — 99214 OFFICE O/P EST MOD 30 MIN: CPT | Performed by: PHYSICIAN ASSISTANT

## 2024-08-09 PROCEDURE — 3078F DIAST BP <80 MM HG: CPT | Performed by: INTERNAL MEDICINE

## 2024-08-09 PROCEDURE — 3008F BODY MASS INDEX DOCD: CPT | Performed by: INTERNAL MEDICINE

## 2024-08-09 PROCEDURE — 1036F TOBACCO NON-USER: CPT | Performed by: INTERNAL MEDICINE

## 2024-08-09 PROCEDURE — 3074F SYST BP LT 130 MM HG: CPT | Performed by: INTERNAL MEDICINE

## 2024-08-09 RX ORDER — BENRALIZUMAB 30 MG/ML
30 INJECTION, SOLUTION SUBCUTANEOUS ONCE
Qty: 1 EACH | Refills: 0 | Status: SHIPPED
Start: 2024-08-09 | End: 2024-08-09

## 2024-08-09 RX ORDER — EPINEPHRINE 0.3 MG/.3ML
0.3 INJECTION SUBCUTANEOUS EVERY 5 MIN PRN
OUTPATIENT
Start: 2024-08-09

## 2024-08-09 RX ORDER — FAMOTIDINE 10 MG/ML
20 INJECTION INTRAVENOUS ONCE AS NEEDED
OUTPATIENT
Start: 2024-08-09

## 2024-08-09 RX ORDER — MONTELUKAST SODIUM 10 MG/1
10 TABLET ORAL EVERY 24 HOURS
Qty: 90 TABLET | Refills: 3 | Status: SHIPPED | OUTPATIENT
Start: 2024-08-09

## 2024-08-09 RX ORDER — BENRALIZUMAB 30 MG/ML
30 INJECTION, SOLUTION SUBCUTANEOUS
Qty: 1 EACH | Refills: 2 | Status: SHIPPED
Start: 2024-08-09

## 2024-08-09 RX ORDER — ACETAMINOPHEN 325 MG/1
650 TABLET ORAL ONCE
OUTPATIENT
Start: 2024-08-09

## 2024-08-09 RX ORDER — DIPHENHYDRAMINE HYDROCHLORIDE 50 MG/ML
50 INJECTION INTRAMUSCULAR; INTRAVENOUS AS NEEDED
OUTPATIENT
Start: 2024-08-09

## 2024-08-09 RX ORDER — ALBUTEROL SULFATE 0.83 MG/ML
3 SOLUTION RESPIRATORY (INHALATION) AS NEEDED
OUTPATIENT
Start: 2024-08-09

## 2024-08-09 ASSESSMENT — ENCOUNTER SYMPTOMS
COUGH: 1
WHEEZING: 0
ARTHRALGIAS: 1
SHORTNESS OF BREATH: 0
FATIGUE: 0

## 2024-08-09 ASSESSMENT — PAIN SCALES - GENERAL
PAINLEVEL: 7
PAINLEVEL: 0-NO PAIN

## 2024-08-09 ASSESSMENT — ASTHMA QUESTIONNAIRES
QUESTION_1 LAST FOUR WEEKS HOW MUCH OF THE TIME DID YOUR ASTHMA KEEP YOU FROM GETTING AS MUCH DONE AT WORK, SCHOOL OR AT HOME: NONE OF THE TIME
QUESTION_5 LAST FOUR WEEKS HOW WOULD YOU RATE YOUR ASTHMA CONTROL: WELL CONTROLLED
QUESTION_2 LAST FOUR WEEKS HOW OFTEN HAVE YOU HAD SHORTNESS OF BREATH: 1 OR 2 TIMES PER WEEK
QUESTION_3 LAST FOUR WEEKS HOW OFTEN DID YOUR ASTHMA SYMPTOMS (WHEEZING, COUGHING, SHORTNESS OF BREATH, CHEST TIGHTNESS OR PAIN) WAKE YOU UP AT NIGHT OR EARLIER THAN USUAL IN THE MORNING: NOT AT ALL
QUESTION_4 LAST FOUR WEEKS HOW OFTEN HAVE YOU USED YOUR RESCUE INHALER OR NEBULIZER MEDICATION (SUCH AS ALBUTEROL): NOT AT ALL
ACT_TOTALSCORE: 23

## 2024-08-09 NOTE — PROGRESS NOTES
"         ANDRES Baig, PAChloeC, ATC  Adult Reconstruction and Joint Replacement Surgery  Phone: [phone]     Fax:[phone]          Chief Complaint   Patient presents with    Left Knee - Follow-up, Pain         HPI:      Betzaida Jc is a pleasant 54 y.o. year-old female who is seen today for follow-up side: left  knee osteoarthritis. The patient denies any history of inflammatory arthritis. Patient denies any new injury or trauma. The patient notes significant loss in range of motion making it difficult go up and down stairs and walk any sort of long distance. They have failed a greater than 3 month trial of conservative treatment including ice, NSAIDS, physical therapy and cortisone injections.  She is currently lost a significant amount of weight.  She is down to 260.  She needs to lose another 20 pounds before she has a BMI of 40.    Review of Systems    There were no vitals filed for this visit.    Past Medical History:   Diagnosis Date    Asthma ([facility])     \"Severe persistent asthma\" per pulmonology    Chest pain     Numerous work ups for CP.    Chronic allergic rhinitis     Chronic constipation     Collapse of right lung     Chronic RML collapse with associated bronchiectasis, seen on CT    Dependence on nocturnal oxygen therapy     2-3 L O2 nightly    Diverticulosis     Hiatal hernia with GERD     4cm per EGD 8/2023    History of venous thromboembolism 06/2010    6-8 weeks post L Achilles repair, LE DVT and unilateral PE. Treated w/ anticoagulation, then found to have possible PE in other lung    Hyperlipidemia     Hypertension     Migraine     Morbid obesity with BMI of 45.0-49.9, adult (Multi)     BRIDGER (obstructive sleep apnea)     partially CPAP compliant w/ 2-3L O2 bleed in    Paroxysmal atrial fibrillation (Multi)     Right renal stone     6 mm stone midpole caliceal system right kidney without obstruction, last seen 6/2023     Patient Active Problem List   Diagnosis    Atrial " fibrillation (Multi)    DVT (deep venous thrombosis) (Multi)    Dizziness    Cyst of maxillary sinus    Claudication (CMS-HCC)    Chronic paroxysmal hemicrania, not intractable    Benign lipomatous neoplasm of skin and subcutaneous tissue of right arm    Anterior tibialis tendinitis of right lower extremity    Anal skin tag    Headache    Acute otalgia, right    Abnormal mammogram    Ear pressure, right    Left knee DJD    Effusion of left knee    Other seborrheic keratosis    Inflamed seborrheic keratosis    Incarcerated epigastric hernia    Impingement syndrome of left shoulder    Hypertension    History of DVT (deep vein thrombosis)    Hearing loss of right ear    Head contusion    Hammer toe of left foot    Gastroesophageal reflux disease    Gassiness    Flu syndrome    Fever    Fear of flying    Biceps tendinitis of left upper extremity    Osteoma    Obstructive sleep apnea    Nausea    Nocturia    Obesity    Morbid obesity (Multi)    Liver lesion    Left rotator cuff tear    Pulmonary embolism (Multi)    S/P abdominal hysterectomy    Shortness of breath at rest    Shoulder swelling    Stress incontinence in female    Skin changes due to chronic exposure to nonionizing radiation, unspecified    Sinus pressure    TIA (transient ischemic attack)    Trismus    Chest pain of uncertain etiology       Medication Documentation Review Audit       Reviewed by Don Lynn PCNA (Patient Care Technician) on 08/09/24 at 0951      Medication Order Taking? Sig Documenting Provider Last Dose Status   acetaminophen (Tylenol) 325 mg tablet [id number] No Take 3 tablets (975 mg) by mouth every 8 hours. Historical Provider, MD Taking Active   albuterol 90 mcg/actuation inhaler [id number] No every 4 hours. Historical Provider, MD Taking Active   apixaban (Eliquis) 5 mg tablet [id number] No Take 1 tablet (5 mg) by mouth twice a day. Historical Provider, MD 0800 Active   atorvastatin (Lipitor) 40 mg tablet [id number] No Take 1  tablet (40 mg) by mouth once daily. Angela Rodriguez MD 0800 Active   azelastine (Astelin) 137 mcg (0.1 %) nasal spray [id number] No 1 spray by Does not apply route every 12 hours if needed. Angela Rodriguez MD Taking Active   cholecalciferol (Vitamin D-3) 50,000 unit capsule [id number] No Take by mouth. Angela Rodriguez MD Taking Active   diclofenac sodium (Voltaren) 1 % gel gel [id number] No Apply 1 Application topically 4 times a day as needed (pain). Blanca Hernandez MD Taking Active   flecainide (Tambocor) 100 mg tablet [id number] No Take 1 tablet (100 mg) by mouth 2 times a day. Angela Rodriguez MD 11/28/2023 0800 Active   ipratropium-albuteroL (Duo-Neb) 0.5-2.5 mg/3 mL nebulizer solution [id number] No Inhale 3 mL every 4 hours. Angela Rodriguez MD Taking Active   Klor-Con M20 20 mEq ER tablet [id number]  Take 1 tablet (20 mEq) by mouth once daily. Take with torsemide Mary Severino PA-C  Active   lidocaine (Lidoderm) 5 % patch [id number] No  Historical MD Jennifer Taking Active   linaCLOtide (Linzess) 145 mcg capsule [id number] No Take 1 tablet by mouth once daily as needed. Angela Rodriguez MD Taking Active   lisinopril 40 mg tablet [id number] No Take 1 tablet (40 mg) by mouth once daily. Angela Rodriguez MD 11/28/2023 0800 Active   loratadine (Claritin) 10 mg tablet [id number] No Take 1 tablet (10 mg) by mouth once daily. Angela Rodriguez MD 0800 Active   meclizine (Antivert) 25 mg tablet [id number] No Take 1 tablet (25 mg) by mouth every 12 hours if needed. Angela Rodriguez MD Taking Active   metFORMIN (Glucophage) 500 mg tablet [id number]  TAKE 1 TABLET BY MOUTH TWICE A DAY WITH A MEAL FOR 30 DAYS Historical MD Jennifer  Active   methocarbamol (Robaxin) 500 mg tablet [id number] No TAKE 1 TABLET BY MOUTH FOUR TIMES DAILY AS NEEDED FOR BACK SPASM Angela Rodriguez MD Taking Active   metoprolol tartrate (Lopressor) 100 mg tablet [id number] No Take 1 tablet (100 mg) by mouth 2  times a day. Angela Rodriguez MD 11/28/2023 0800 Active   montelukast (Singulair) 10 mg tablet [id number] No Take 1 tablet (10 mg) by mouth once every 24 hours. Angela Rodriguez MD 11/28/2023 0800 Active   omeprazole (PriLOSEC) 40 mg DR capsule [id number] No 1 capsule (40 mg) once every 24 hours. Angela Rodriguez MD Taking Active   ondansetron (Zofran) 4 mg tablet [id number] No Take 1 tablet (4 mg) by mouth every 6 hours if needed for nausea or vomiting. Angela Rodriguez MD Taking Active   predniSONE (Deltasone) 10 mg tablet [id number] No Take 1 tablet (10 mg) by mouth once daily. Angela Rodriguez MD 0800 Active   tirzepatide (Mounjaro) 2.5 mg/0.5 mL pen injector [id number]  Inject 2.5 mg under the skin every 7 days. Angela Rodriguez MD  Active   topiramate (Topamax) 100 mg tablet [id number] No Take by mouth. Angela Rodriguez MD Taking Active   torsemide (Demadex) 20 mg tablet [id number]  Take 1 tablet (20 mg) by mouth once daily. Mary Severino PA-C  Active   traMADol (Ultram) 50 mg tablet [id number] No Take 1 tablet (50 mg) by mouth. Angela Rodriguez MD Past Month Active   verapamil ER (Veralan PM) 300 mg 24 hr capsule [id number] No Take 1 capsule (300 mg) by mouth once daily at bedtime. Angela Rodriguez MD 11/28/2023 0800 Active                    Allergies   Allergen Reactions    Penicillins Other, Shortness of breath and Unknown     asthma    Sulfa (Sulfonamide Antibiotics) Other     Asthma        Social History     Socioeconomic History    Marital status: Single     Spouse name: Not on file    Number of children: Not on file    Years of education: Not on file    Highest education level: Not on file   Occupational History    Not on file   Tobacco Use    Smoking status: Never    Smokeless tobacco: Never   Vaping Use    Vaping status: Never Used   Substance and Sexual Activity    Alcohol use: Yes     Alcohol/week: 2.0 standard drinks of alcohol     Types: 2 Standard drinks or  equivalent per week    Drug use: Never    Sexual activity: Not on file   Other Topics Concern    Not on file   Social History Narrative    Not on file     Social Determinants of Health     Financial Resource Strain: Low Risk  (11/28/2023)    Overall Financial Resource Strain (CARDIA)     Difficulty of Paying Living Expenses: Not hard at all   Food Insecurity: No Food Insecurity (7/12/2024)    Received from [facility]    Hunger Vital Sign     Worried About Running Out of Food in the Last Year: Never true     Ran Out of Food in the Last Year: Never true   Transportation Needs: No Transportation Needs (7/12/2024)    Received from [facility]    PRAPARE - Transportation     Lack of Transportation (Medical): No     Lack of Transportation (Non-Medical): No   Physical Activity: Inactive (11/28/2023)    Exercise Vital Sign     Days of Exercise per Week: 0 days     Minutes of Exercise per Session: 0 min   Stress: No Stress Concern Present (11/28/2023)    [employer] [employer] - Occupational Stress Questionnaire     Feeling of Stress : Not at all   Social Connections: Socially Isolated (11/28/2023)    Social Connection and Isolation Panel [NHANES]     Frequency of Communication with Friends and Family: More than three times a week     Frequency of Social Gatherings with Friends and Family: More than three times a week     Attends Denominational Services: Never     Active Member of Clubs or Organizations: No     Attends Club or Organization Meetings: Never     Marital Status:    Intimate Partner Violence: Not At Risk (11/28/2023)    Humiliation, Afraid, Rape, and Kick questionnaire     Fear of Current or Ex-Partner: No     Emotionally Abused: No     Physically Abused: No     Sexually Abused: No   Housing Stability: Low Risk  (11/28/2023)    Housing Stability Vital Sign     Unable to Pay for Housing in the Last Year: No     Number of Places Lived in the Last Year: 1     Unstable Housing in the  Last Year: No       Past Surgical History:   Procedure Laterality Date    ACHILLES TENDON SURGERY Left 2010    BI BREAST RIGHT CORE NEEDLE BIOPSY       SECTION, CLASSIC      COLONOSCOPY      CT ANGIO CORONARY ART WITH HEARTFLOW IF SCORE >30%  2022    CT HEART CORONARY ANGIOGRAM 2022 DOCTOR OFFICE LEGACY    ESOPHAGOSCOPY / EGD  2023    KNEE ARTHROSCOPY W/ DEBRIDEMENT Right     MR HEAD ANGIO WO IV CONTRAST  2019    MR HEAD ANGIO WO IV CONTRAST 2019 [facility] ANCILLARY LEGACY    MR NECK ANGIO WO IV CONTRAST  2019    MR NECK ANGIO WO IV CONTRAST 2019 [facility] ANCILLARY LEGACY    PARTIAL HYSTERECTOMY  2006    fibroids/bleeding    SHOULDER ARTHROSCOPY W/ ROTATOR CUFF REPAIR Left 2020    TOE SURGERY Right 2023    Digital arthroplasties with pin fixation, toes 2, 3, right foot.       Body mass index is 43.27 kg/m².    Physical Exam:  side: left Knee:  General: Well-appearing female in no acute distress.  Awake, alert and oriented.  Pleasant and cooperative.  Respiratory: Non-labored breathing  Mood: Euthymic   Gait: Antalgic  Assistive Device: no device  Skin: warm, dry and intact without lesions    Tenderness to palpation over anterior and medial, Joint line.  Effusion: mild  ROM Extension: 0 Flexion: 120  Valgus Stress 0 degrees-Negative  Valgus Stress 30 degrees-Negative  Varus Stress 0 degrees-Negative  Valgus Stress 30 degrees-Negative  Instability in the AP plane at 90 degrees No  Lachmans 1+  Anterior Drawer-Negative  Extensor Mechanism-intact, Patellar tendon non-tender  5/5 knee extension  5/5 knee flexion, DF/PF/EHL  SILT in a hamlet/saph/per/tib distribution  Neuro L5-S1 sensation intact bilaterally, No clonus. 2+ symmetric patella and achilles reflexes bilateral.  2+ Femoral/DP/PT pulses bilaterally  Compartments supple and non-tender.  Extremities warm and well perfused.      Imaging:  [unfilled]    Imaging-4 view xrays of the side: left knee were reviewed  and interpreted in clinic today. The findings were reviewed with the patient. There are Advanced joint space narrowing with underlying osteophytic, sclerotic change and cystic changes. No fractures or dislocation. Mild soft tissue swelling and effusion noted.    Impression/Plan:    Betzaida Jc and I discussed the etiology of her symptoms. We discussed in detail a treatment plan that she is comfortable with.     Known Advanced Osteoarthritis side: left Knee. We reviewed an evidence-based approach to osteoarthritis of the knee.   2. I  discussed non-operative treatments for the patients' arthritis in detail and briefly discussed indications for surgery vs conservative treatment.  We discussed that she still needs to lose about 20 pounds before proceeding with surgery.  I did discuss with her that she can call to schedule surgery provided that she loses 10 more pounds.  If she does not lose the remainder 10 pounds prior to surgery she understands that her surgical case will be canceled with Dr.Steven Perrin.  She will follow-up with me when she loses 10 pounds for another weight check.  She will call the office to schedule when this does occur.  She also understands that our surgical schedule is booking into February and that she would need to go onto a cancellation list if she wanted to have surgery sooner.  She can also be referred back to Dr. Ginger naranjo if she wanted to have this done sooner.     All questions were answered.     Follow-up ANDRES Sanchez, PA-C, ATC  Orthopedic Physician Assisant  Adult Reconstruction and Total Joint Replacement  General Orthopedics  Department of Orthopaedic Surgery  [place] [id number]  [employer] messaging preferred

## 2024-08-09 NOTE — PROGRESS NOTES
Department of Medicine  Division of Pulmonary, Critical Care, and Sleep Medicine  Location  [facility], [address]    I was asked by No ref. provider found, to evaluate Betzaida Jc for asthma. I have independently interviewed and examined the patient in the office and reviewed available records.     Physician HPI (8/9/2024):  54 y.o. female who is a former patient of Dr. Ambriz. She was following with him for severe asthma, bronchiectasis, pulmonary nodules, BRIDGER.  She also has a past medical history significant for GERD, HFpEF, A-fib, PE, hypertension.    She states she has been on chronic prednisone therapy of 10 mg daily for over 2 years.  Since being started on prednisone, her symptoms have been under good control.  Triggers include breathing air conditioned air, and changes of seasons.  Last time she saw Dr. Ambriz was in December 2023, and at that time she was maintained on Trelegy Ellipta 200.  She states that she was unable to afford Trelegy and has been only on albuterol as needed.  She also takes Singulair at night.    She is also taking prednisone for ankle arthritis.  This is according to her podiatrist, however, I am unable to ascertain from podiatry's notes if this is indicated for ankle arthritis or not.  She states she used to have PFTs with Dr. Ambriz every 6 months, however, there are no records of spirometry documentation in his notes or in our system.    Her IgE levels have been low.  Her most recent eosinophil count was 110, however, she has had counts as high as 730.    Since being placed on chronic prednisone therapy, she has not had any exacerbations, but was a frequent exacerbater beforehand.    She is a lifetime non-smoker.  She works at [employer].  She also states she has had COVID 5 times.    Of note, she uses CPAP at night, but also uses 3 L/min oxygen bleed in.  She also has a history of nodules which have been stable since 2020.    PMH:  Past Medical History:   Diagnosis  "Date    Asthma ([facility])     \"Severe persistent asthma\" per pulmonology    Chest pain     Numerous work ups for CP.    Chronic allergic rhinitis     Chronic constipation     Collapse of right lung     Chronic RML collapse with associated bronchiectasis, seen on CT    Dependence on nocturnal oxygen therapy     2-3 L O2 nightly    Diverticulosis     Hiatal hernia with GERD     4cm per EGD 2023    History of venous thromboembolism 2010    6-8 weeks post L Achilles repair, LE DVT and unilateral PE. Treated w/ anticoagulation, then found to have possible PE in other lung    Hyperlipidemia     Hypertension     Migraine     Morbid obesity with BMI of 45.0-49.9, adult (Multi)     BRIDGER (obstructive sleep apnea)     partially CPAP compliant w/ 2-3L O2 bleed in    Paroxysmal atrial fibrillation (Multi)     Right renal stone     6 mm stone midpole caliceal system right kidney without obstruction, last seen 2023       PSH:  Past Surgical History:   Procedure Laterality Date    ACHILLES TENDON SURGERY Left 2010    BI BREAST RIGHT CORE NEEDLE BIOPSY       SECTION, CLASSIC      COLONOSCOPY      CT ANGIO CORONARY ART WITH HEARTFLOW IF SCORE >30%  2022    CT HEART CORONARY ANGIOGRAM 2022 DOCTOR OFFICE LEGACY    ESOPHAGOSCOPY / EGD  2023    KNEE ARTHROSCOPY W/ DEBRIDEMENT Right     MR HEAD ANGIO WO IV CONTRAST  2019    MR HEAD ANGIO WO IV CONTRAST 2019 [facility] ANCILLARY LEGACY    MR NECK ANGIO WO IV CONTRAST  2019    MR NECK ANGIO WO IV CONTRAST 2019 [facility] ANCILLARY LEGACY    PARTIAL HYSTERECTOMY  2006    fibroids/bleeding    SHOULDER ARTHROSCOPY W/ ROTATOR CUFF REPAIR Left 2020    TOE SURGERY Right 2023    Digital arthroplasties with pin fixation, toes 2, 3, right foot.       FHx:  Family History   Problem Relation Name Age of Onset    Thyroid cancer Mother      Kidney cancer Father      Diabetes Other      Hypertension Other      Stroke Other         Social " Hx:  Social History     Socioeconomic History    Marital status: Single   Tobacco Use    Smoking status: Never    Smokeless tobacco: Never   Vaping Use    Vaping status: Never Used   Substance and Sexual Activity    Alcohol use: Yes     Alcohol/week: 2.0 standard drinks of alcohol     Types: 2 Standard drinks or equivalent per week    Drug use: Never     Social Determinants of Health     Financial Resource Strain: Low Risk  (11/28/2023)    Overall Financial Resource Strain (CARDIA)     Difficulty of Paying Living Expenses: Not hard at all   Food Insecurity: No Food Insecurity (7/12/2024)    Received from [facility]    Hunger Vital Sign     Worried About Running Out of Food in the Last Year: Never true     Ran Out of Food in the Last Year: Never true   Transportation Needs: No Transportation Needs (7/12/2024)    Received from [facility]    PRAPARE - Transportation     Lack of Transportation (Medical): No     Lack of Transportation (Non-Medical): No   Physical Activity: Inactive (11/28/2023)    Exercise Vital Sign     Days of Exercise per Week: 0 days     Minutes of Exercise per Session: 0 min   Stress: No Stress Concern Present (11/28/2023)    [place] [employer] - Occupational Stress Questionnaire     Feeling of Stress : Not at all   Social Connections: Socially Isolated (11/28/2023)    Social Connection and Isolation Panel [NHANES]     Frequency of Communication with Friends and Family: More than three times a week     Frequency of Social Gatherings with Friends and Family: More than three times a week     Attends Congregational Services: Never     Active Member of Clubs or Organizations: No     Attends Club or Organization Meetings: Never     Marital Status:    Intimate Partner Violence: Not At Risk (11/28/2023)    Humiliation, Afraid, Rape, and Kick questionnaire     Fear of Current or Ex-Partner: No     Emotionally Abused: No     Physically Abused: No     Sexually Abused: No    Housing Stability: Low Risk  (11/28/2023)    Housing Stability Vital Sign     Unable to Pay for Housing in the Last Year: No     Number of Places Lived in the Last Year: 1     Unstable Housing in the Last Year: No       Immunization History:  Immunization History   Administered Date(s) Administered    Flu vaccine (IIV4), preservative free *Check age/dose* 08/23/2017, 09/25/2018, 09/19/2019, 09/16/2021    Flu vaccine, quadrivalent, no egg protein, age 6 month or greater (FLUCELVAX) 09/25/2020, 09/20/2022, 09/26/2023    Flu vaccine, trivalent, preservative free, no egg protein, age 18y+ (Flublok) 09/01/2020    Influenza, seasonal, injectable 09/15/2017    Pfizer Gray Cap SARS-CoV-2 01/24/2022, 07/12/2022    Pfizer Purple Cap SARS-CoV-2 03/31/2021, 04/29/2021    Pneumococcal conjugate vaccine, 20-valent (PREVNAR 20) 09/26/2023    Pneumococcal polysaccharide vaccine, 23-valent, age 2 years and older (PNEUMOVAX 23) 11/04/2020    Zoster vaccine, recombinant, adult (SHINGRIX) 06/10/2022, 08/21/2022       Current Medications:  Current Outpatient Medications   Medication Instructions    acetaminophen (TYLENOL) 975 mg, oral, Every 8 hours    albuterol 90 mcg/actuation inhaler Every 4 hours    apixaban (ELIQUIS) 5 mg, oral, 2 times daily    atorvastatin (LIPITOR) 40 mg, oral, Daily    azelastine (Astelin) 137 mcg (0.1 %) nasal spray 1 spray, Does not apply, Every 12 hours PRN    cholecalciferol (Vitamin D-3) 50,000 unit capsule oral    diclofenac sodium (Voltaren) 1 % gel gel 1 Application, Topical, 4 times daily PRN    flecainide (TAMBOCOR) 100 mg, oral, 2 times daily    ipratropium-albuteroL (Duo-Neb) 0.5-2.5 mg/3 mL nebulizer solution 3 mL, inhalation, Every 4 hours RT    Klor-Con M20 20 mEq ER tablet 20 mEq, oral, Daily, Take with torsemide    lidocaine (Lidoderm) 5 % patch     linaCLOtide (Linzess) 145 mcg capsule 1 tablet, oral, Daily PRN    lisinopril 40 mg, oral, Daily    loratadine (Claritin) 10 mg tablet 1 tablet,  "oral, Daily    meclizine (ANTIVERT) 25 mg, oral, Every 12 hours PRN    metFORMIN (Glucophage) 500 mg tablet TAKE 1 TABLET BY MOUTH TWICE A DAY WITH A MEAL FOR 30 DAYS    methocarbamol (Robaxin) 500 mg tablet TAKE 1 TABLET BY MOUTH FOUR TIMES DAILY AS NEEDED FOR BACK SPASM    metoprolol tartrate (LOPRESSOR) 100 mg, oral, 2 times daily    montelukast (SINGULAIR) 10 mg, oral, Every 24 hours    Mounjaro 2.5 mg, subcutaneous, Every 7 days    omeprazole (PriLOSEC) 40 mg DR capsule 1 capsule, Every 24 hours    ondansetron (ZOFRAN) 4 mg, oral, Every 6 hours PRN    predniSONE (DELTASONE) 10 mg, oral, Daily RT    topiramate (Topamax) 100 mg tablet oral    torsemide (DEMADEX) 20 mg, oral, Daily    traMADol (ULTRAM) 50 mg, oral    verapamil ER (VERALAN PM) 300 mg, oral, Nightly        Drug Allergies/Intolerances:  Allergies   Allergen Reactions    Penicillins Other, Shortness of breath and Unknown     asthma    Sulfa (Sulfonamide Antibiotics) Other     Asthma         Review of Systems:  Review of Systems   Constitutional:  Negative for fatigue.   Respiratory:  Positive for cough. Negative for shortness of breath and wheezing.    Cardiovascular:  Negative for chest pain and leg swelling.   Musculoskeletal:  Positive for arthralgias.   Allergic/Immunologic: Positive for environmental allergies.        All other review of systems are negative and/or non-contributory.    Physical Examination:  Vitals:    08/09/24 1340   BP: 107/71   BP Location: Right arm   Patient Position: Sitting   Pulse: 84   Resp: 18   Temp: 36.7 °C (98.1 °F)   SpO2: 97%   Weight: 119 kg (263 lb)   Height: 1.676 m (5' 6\")        GEN: appears well. No respiratory difficulties  EYES: DIEGO, EOMI  ENT: Mallampati III,   CV: RRR, no m/g/r  LUNGS: good effort, clear bilaterally, no w/r/r  EXT: no edema, cyanosis, clubbing    Exacerbation History      None in past 2 years    Pulmonary Function Test Results     None on file    Peak Flow and ACT     8/9/2024: " 23    Chest Radiograph     XR chest 2 views 05/30/2024    Narrative  Interpreted By:  Marvel Coleman,  STUDY:  XR CHEST 2 VIEWS;  5/30/2024 5:00 pm    INDICATION:  Signs/Symptoms:fever/chest pain.    COMPARISON:  Chest radiograph 04/30/2024    ACCESSION NUMBER(S):  [id number]    ORDERING CLINICIAN:  ROLDAN MG    FINDINGS:      CARDIOMEDIASTINAL SILHOUETTE:  Cardiomediastinal silhouette is unchanged.    LUNGS:  No consolidation, pneumothorax, or effusion.    ABDOMEN:  No remarkable upper abdominal findings.    BONES:  No acute osseous changes.    Remaining findings appear stable since prior comparison radiograph.    Impression  1.  No evidence of acute cardiopulmonary process.      Signed by: Marvel Coleman 5/30/2024 5:31 PM  Dictation workstation:   [id number]      Chest CT Scan     CT angio chest w and wo IV contrast 07/11/2024  COMPARISON:  12/12/2023    Evaluation for thromboembolic disease:  No filling defects to suggest acute pulmonary embolism.  The central  pulmonary arteries are within normal size limits.    Lower neck, lymph nodes, and mediastinum:  No pathologically enlarged lymph nodes in the chest. Visualized thyroid  gland is unremarkable. Small hiatal hernia. No acute mediastinal process.    Lung parenchyma, pleura, and airways:  Patent central airways. Eventration of both hemidiaphragms with minimal  compressive bibasilar atelectasis. Stable volume loss and  bronchiolectasis in the anteromedial right lower lobe.  No consolidations  or edema. No suspicious pulmonary nodules.  No pleural effusions.  No  pneumothorax.    Impression  IMPRESSION:  No acute pulmonary embolism.    Small, stable mixed-type hiatal hernia.     6/8/2024:  1.  No suspicious pulmonary nodules identified. Scattered 2-4 mm  nodules throughout the lungs have remained stable to at least  02/28/2020 and are consistent with benign etiology.  2. No acute intrathoracic pathology. Chronic collapse of the right  middle lobe is  unchanged compared to remote examinations.  3. Additional findings as above.    10/7/2023:  There is adequate contrast opacification of the pulmonary arterial  vasculature. No filling defect or vessel cutoff to indicate pulmonary  embolus.      The heart size is within normal limits.  No pericardial effusion is  identified. The aorta and pulmonary arteries are normal in caliber.  No thoracic aortic dissection      There are no pathologically enlarged mediastinal, hilar, or axillary  lymph nodes are seen.      The trachea and mainstem bronchi are patent. No suspicious pulmonary  nodules are seen. The lungs are clear. There is no evidence of  pneumothorax or pleural effusion.    7/7/2022:  No evidence of acute pulmonary embolism.     6 mm and 7 mm groundglass nodular opacities in the right middle lobe  along the fissure may be infectious/inflammatory etiology; follow-up  CT chest recommended in 6 to months to assess stability/resolution.    4/20/2021:  1.  No CT finding of acute pulmonary embolus or aortic dissection or  change from prior.  2. Chronic atelectasis in the right middle lobe.    Bronchoscopy     None    Labs     Lab Results   Component Value Date    WBC 6.7 05/30/2024    HGB 12.0 05/30/2024    HCT 38.0 05/30/2024    MCV 92 05/30/2024     05/30/2024     Lab Results   Component Value Date    BNP 30 06/08/2023     Lab Results   Component Value Date    IGE 18 03/30/2022    EOSABS 0.11 05/30/2024    EOSABS 0.73 (H) 04/30/2024    EOSABS 0.12 03/30/2024    EOSABS 0.15 11/29/2023    ICIGE 39.7 03/30/2022         Echocardiogram     No results found for this or any previous visit from the past 365 days.         ASSESSMENT & PLAN     Problem List Items Addressed This Visit       Severe persistent asthma (Multi) - Primary    Relevant Medications    benralizumab (Fasenra) 30 mg/mL injection    montelukast (Singulair) 10 mg tablet    Other Relevant Orders    Complete Pulmonary Function Test Pre/Post  Bronchodialator (Spirometry Pre/Post/DLCO/Lung Volumes)    Exhaled Nitric Oxide (FeNO)     Other Visit Diagnoses       BRIDGER (obstructive sleep apnea)        Relevant Orders    Referral to Adult Sleep Medicine             Summary:  54 y.o. female with severe persistent eosinophilic asthma who is on chronic prednisone. Her symptoms appear to be under good control, however, this is likely due to the fact that she is on daily prednisone.  She has had elevated eosinophil counts in the past.  She is a good candidate for benralizumab therapy.  This will hopefully be able to get her off prednisone.    Also instructed her to discuss with her podiatrist to find alternative therapy for her ankle arthritis other than prednisone.    Plan:  -Continue as needed albuterol  -Continue Singulair  -Continue prednisone for now until we get her benralizumab approved  -Will get formal PFT with FeNO testing  -No further surveillance for her stable lung nodules is required.  -Referral to sleep medicine to assess the need for O2 supplementation in addition to CPAP therapy at night. She may benefit from repeat titration off supplemental O2    Follow-up: 11/15/2024        Stuart Hodge DO  Staff Physician - Pulmonary & Critical Care  08/09/24 1:57 PM  Office number: [phone]   Fax number:  [phone]

## 2024-08-15 ENCOUNTER — HOSPITAL ENCOUNTER (OUTPATIENT)
Dept: RESPIRATORY THERAPY | Facility: CLINIC | Age: 54
Discharge: HOME | End: 2024-08-15
Payer: COMMERCIAL

## 2024-08-15 ENCOUNTER — APPOINTMENT (OUTPATIENT)
Dept: RESPIRATORY THERAPY | Facility: HOSPITAL | Age: 54
End: 2024-08-15
Payer: COMMERCIAL

## 2024-08-15 DIAGNOSIS — J45.50 SEVERE PERSISTENT ASTHMA, UNSPECIFIED WHETHER COMPLICATED (MULTI): ICD-10-CM

## 2024-08-15 PROCEDURE — 94060 EVALUATION OF WHEEZING: CPT

## 2024-08-15 PROCEDURE — 94729 DIFFUSING CAPACITY: CPT

## 2024-08-15 PROCEDURE — 94727 GAS DIL/WSHOT DETER LNG VOL: CPT

## 2024-08-23 ENCOUNTER — DOCUMENTATION (OUTPATIENT)
Dept: INFUSION THERAPY | Facility: CLINIC | Age: 54
End: 2024-08-23
Payer: COMMERCIAL

## 2024-08-23 NOTE — PROGRESS NOTES
CLINICAL CLEARANCE FOR OUTPATIENT INJECTION      Patient to be scheduled for New Start of Fasenra injections.    For Diagnosis: Asthma     Review of Baseline Labs…  CBC-D prior to inititation/first dose:    Lab Results   Component Value Date    WBC 6.7 05/30/2024    HGB 12.0 05/30/2024    HCT 38.0 05/30/2024    MCV 92 05/30/2024     05/30/2024      Eosinophils:   Lab Results   Component Value Date/Time    COLLEOS 1.10 06/03/2023 2000    EOSABS 0.11 05/30/2024 1610      (>150 at some point prior to initiation, may be currently lower d/t concurrent therapies)    Start date: anytime - insurance has approved. Patient will be called to schedule the appt.    Bill to schedule treatment as ordered per prescribing provider.     [facility]  [address] [place] [address] A, [place] [address]  Phone: [phone]

## 2024-08-29 ENCOUNTER — HOSPITAL ENCOUNTER (OUTPATIENT)
Dept: RESPIRATORY THERAPY | Facility: CLINIC | Age: 54
Discharge: HOME | End: 2024-08-29
Payer: COMMERCIAL

## 2024-08-29 DIAGNOSIS — J45.50 SEVERE PERSISTENT ASTHMA, UNSPECIFIED WHETHER COMPLICATED (MULTI): ICD-10-CM

## 2024-09-19 ENCOUNTER — LAB (OUTPATIENT)
Dept: LAB | Facility: LAB | Age: 54
End: 2024-09-19
Payer: COMMERCIAL

## 2024-09-19 ENCOUNTER — APPOINTMENT (OUTPATIENT)
Dept: INFUSION THERAPY | Facility: CLINIC | Age: 54
End: 2024-09-19
Payer: COMMERCIAL

## 2024-09-19 VITALS
HEART RATE: 72 BPM | TEMPERATURE: 97 F | OXYGEN SATURATION: 98 % | SYSTOLIC BLOOD PRESSURE: 130 MMHG | DIASTOLIC BLOOD PRESSURE: 90 MMHG | RESPIRATION RATE: 16 BRPM

## 2024-09-19 DIAGNOSIS — J45.50 SEVERE PERSISTENT ASTHMA, UNSPECIFIED WHETHER COMPLICATED (MULTI): ICD-10-CM

## 2024-09-19 LAB
BASOPHILS # BLD AUTO: 0.01 X10*3/UL (ref 0–0.1)
BASOPHILS NFR BLD AUTO: 0.2 %
EOSINOPHIL # BLD AUTO: 0.18 X10*3/UL (ref 0–0.7)
EOSINOPHIL NFR BLD AUTO: 3.7 %
ERYTHROCYTE [DISTWIDTH] IN BLOOD BY AUTOMATED COUNT: 13.8 % (ref 11.5–14.5)
HCT VFR BLD AUTO: 34.5 % (ref 36–46)
HGB BLD-MCNC: 10.7 G/DL (ref 12–16)
IMM GRANULOCYTES # BLD AUTO: 0.02 X10*3/UL (ref 0–0.7)
IMM GRANULOCYTES NFR BLD AUTO: 0.4 % (ref 0–0.9)
LYMPHOCYTES # BLD AUTO: 1.41 X10*3/UL (ref 1.2–4.8)
LYMPHOCYTES NFR BLD AUTO: 29.1 %
MCH RBC QN AUTO: 29.1 PG (ref 26–34)
MCHC RBC AUTO-ENTMCNC: 31 G/DL (ref 32–36)
MCV RBC AUTO: 94 FL (ref 80–100)
MONOCYTES # BLD AUTO: 0.61 X10*3/UL (ref 0.1–1)
MONOCYTES NFR BLD AUTO: 12.6 %
NEUTROPHILS # BLD AUTO: 2.61 X10*3/UL (ref 1.2–7.7)
NEUTROPHILS NFR BLD AUTO: 54 %
NRBC BLD-RTO: 0 /100 WBCS (ref 0–0)
PLATELET # BLD AUTO: 312 X10*3/UL (ref 150–450)
RBC # BLD AUTO: 3.68 X10*6/UL (ref 4–5.2)
WBC # BLD AUTO: 4.8 X10*3/UL (ref 4.4–11.3)

## 2024-09-19 PROCEDURE — 36415 COLL VENOUS BLD VENIPUNCTURE: CPT

## 2024-09-19 PROCEDURE — 85025 COMPLETE CBC W/AUTO DIFF WBC: CPT

## 2024-09-19 PROCEDURE — 96372 THER/PROPH/DIAG INJ SC/IM: CPT | Performed by: NURSE PRACTITIONER

## 2024-09-19 RX ORDER — ACETAMINOPHEN 325 MG/1
650 TABLET ORAL ONCE
Status: DISCONTINUED | OUTPATIENT
Start: 2024-09-19 | End: 2024-09-19 | Stop reason: HOSPADM

## 2024-09-19 RX ORDER — ALBUTEROL SULFATE 0.83 MG/ML
3 SOLUTION RESPIRATORY (INHALATION) AS NEEDED
OUTPATIENT
Start: 2024-10-17

## 2024-09-19 RX ORDER — ACETAMINOPHEN 325 MG/1
650 TABLET ORAL ONCE
OUTPATIENT
Start: 2024-10-17

## 2024-09-19 RX ORDER — EPINEPHRINE 0.3 MG/.3ML
0.3 INJECTION SUBCUTANEOUS EVERY 5 MIN PRN
OUTPATIENT
Start: 2024-10-17

## 2024-09-19 RX ORDER — DIPHENHYDRAMINE HYDROCHLORIDE 50 MG/ML
50 INJECTION INTRAMUSCULAR; INTRAVENOUS AS NEEDED
OUTPATIENT
Start: 2024-10-17

## 2024-09-19 RX ORDER — FAMOTIDINE 10 MG/ML
20 INJECTION INTRAVENOUS ONCE AS NEEDED
OUTPATIENT
Start: 2024-10-17

## 2024-09-19 ASSESSMENT — ENCOUNTER SYMPTOMS
DIZZINESS: 0
WHEEZING: 0
NUMBNESS: 0
PALPITATIONS: 0
EXTREMITY WEAKNESS: 0
COUGH: 0
WOUND: 0
LIGHT-HEADEDNESS: 0
SHORTNESS OF BREATH: 0
LEG SWELLING: 0

## 2024-09-19 NOTE — PROGRESS NOTES
[facility]   Infusion Clinic Note   Date: 2024   Name: Betzaida Jc  : 1970   MRN: [id number]          Reason for Visit: New Patient Visit and Injections (1st Fasenra 30mg subcutaneous injection)         Today: We administered benralizumab.       Visit Type: INJECTION       Ordered By: Stuart Valdes DO       Accompanied by: Self       Diagnosis: Severe persistent asthma, unspecified whether complicated (Multi)        Allergies:   Allergies as of 2024 - Reviewed 2024   Allergen Reaction Noted    Penicillins Other, Shortness of breath, and Unknown 10/11/2006    Sulfa (sulfonamide antibiotics) Other 2013          Current Medications has a current medication list which includes the following prescription(s): acetaminophen, albuterol, apixaban, atorvastatin, azelastine, fasenra, benralizumab, cholecalciferol, diclofenac sodium, flecainide, ipratropium-albuterol, klor-con m20, lidocaine, linaclotide, lisinopril, loratadine, meclizine, metformin, methocarbamol, metoprolol tartrate, montelukast, omeprazole, ondansetron, prednisone, mounjaro, topiramate, torsemide, and verapamil er, and the following Facility-Administered Medications: acetaminophen.       Vitals:   Vitals:    24 0713 24 0755   BP: 136/90 130/90   Pulse: 71 72   Resp: 16 16   Temp: 36.2 °C (97.2 °F) 36.1 °C (97 °F)   TempSrc: Oral Temporal   SpO2: 99% 98%             Infusion Pre-procedure Checklist:   - Allergies reviewed: yes   - Medications reviewed: yes       - Previous reaction to current treatment: n/a      Assess patient for the concerns below. Document provider notification as appropriate.  - Active or recent infection with/without current antibiotic use: no  - Recent or planned invasive dental work: no  - Recent or planned surgeries: no  - Recently received or plans to receive vaccinations: no  - Has treatment related toxicities: no  - Is pregnant:  no      Pain: 0   - Is  the pain different from normal: n/a   - Is your Doctor aware:  n/a                Fall Risk Screening: Hong Fall Risk  History of Falling, Immediate or Within 3 Months: No  Ambulatory Aid: Crutches/cane/walker  Intravenous Therapy/Heparin Lock: No  Gait/Transferring: Normal/bedrest/immobile  Mental Status: Oriented to own ability       Review Of Systems:  Review of Systems   Respiratory:  Negative for cough, shortness of breath and wheezing.         H/O asthma   Cardiovascular:  Negative for chest pain, leg swelling and palpitations.   Skin:  Negative for itching, rash and wound.   Neurological:  Negative for dizziness, extremity weakness, light-headedness and numbness.         ROS completed? Yes      Infusion Readiness:  - Assessment Concerns Related to Infusion: No  - Provider notified: n/a      Document Below Only If Indicated:   New Patient Education:    NEW PATIENT MEDICATION EDUCATION PT PROVIDED WITH WRITTEN (kSARIA PT EDUCATION SHEET) AND VERBAL EDUCATION REGARDING MEDICATION GIVEN. VERIFIED MEDICATION NAME WITH PATIENT AND DISCUSSED REASON FOR USE. BRIEFLY DISCUSSED HOW MEDICATION WORKS AND EDUCATED ON GOAL OF TREATMENT, FREQUENCY OF TREATMENT, ADVERSE RXN'S AND COMMON SIDE EFFECTS TO MONITOR FOR. INSTRUCTED PT TO ASSURE THAT ALL PROVIDERS INCLUDING DENTISTS ARE AWARE OF MEDICATION RECEIVED. DISCUSSED FLOW OF VISIT AND ORIENTED TO INFUSION CENTER. PT VERBALIZES UNDERSTANDING. CALL LIGHT PROVIDED AND PT AWARE TO ALERT STAFF OF ANY CONCERNS DURING TREATMENT.        Treatment Conditions & Drug Specific Questions:    Benralizumab  (FASENRA)    (Unless otherwise specified on patient specific therapy plan):     DRUG SPECIFIC QUESTIONS:  Are you still taking your maintenance asthma medications (ICS and LABAs)? Yes   (Patient should continue taking maintenance asthmas medications while on fasenra unless otherwise instructed by prescribing provider)      REMINDER:  Recommended Vitals/Observation:  Vitals: Obtain  vital signs prior to injection and at end of observation period.   Observation: Patient may leave 30 minutes post after the FIRST injection. Patients may leave immediately after injection for all subsequent injections.        Weight Based Drug Calculations:    WEIGHT BASED DRUGS: NOT APPLICABLE / FLAT DOSE          Initiated By: Neida King LPN

## 2024-09-19 NOTE — PATIENT INSTRUCTIONS
Today :We administered benralizumab. 1st Fasenra 30mg subcutaneous injection left upper arm    For:   1. Severe persistent asthma, unspecified whether complicated (Multi)       Your next appointment is due in:  4 weeks        Please read the  Medication Guide that was given to you and reviewed during todays visit.     (Tell all doctors including dentists that you are taking this medication)     Go to the emergency room or call 911 if:  -You have signs of allergic reaction:   -Rash, hives, itching.   -Swollen, blistered, peeling skin.   -Swelling of face, lips, mouth, tongue or throat.   -Tightness of chest, trouble breathing, swallowing or talking     Call your doctor:  - If injection site gets red, warm, swollen, itchy or leaks fluid or pus.     (Leave band aid on your injection site for at least 2 hours and keep area clean and dry  - If you get sick or have symptoms of infection or are not feeling well for any reason.    (Wash your hands often, stay away from people who are sick)  - If you have side effects from your medication that do not go away or are bothersome.     (Refer to the teaching your nurse gave you for side effects to call your doctor about)    - Common side effects may include:  stuffy nose, headache, feeling tired, muscle aches, upset stomach  - Before receiving any vaccines     - Call the [facility] at   If:  - You get sick, are on antibiotics, have had a recent vaccine, have surgery or dental work and your doctor wants your visit rescheduled.  - You need to cancel and reschedule your visit for any reason. Call at least 2 days before your visit if you need to cancel.   - Your insurance changes before your next visit.    (We will need to get approval from your new insurance. This can take up to two weeks.)     The [facility] is opened Monday thru Friday. We are closed on weekends and holidays.   Voice mail will take your call if the center is closed. If you leave a  message please allow 24 hours for a call back during weekdays. If you leave a message on a weekend/holiday, we will call you back the next business day.

## 2024-09-20 ENCOUNTER — OFFICE VISIT (OUTPATIENT)
Dept: SLEEP MEDICINE | Facility: CLINIC | Age: 54
End: 2024-09-20
Payer: COMMERCIAL

## 2024-09-20 ENCOUNTER — TELEPHONE (OUTPATIENT)
Dept: SLEEP MEDICINE | Facility: HOSPITAL | Age: 54
End: 2024-09-20

## 2024-09-20 VITALS
DIASTOLIC BLOOD PRESSURE: 80 MMHG | HEART RATE: 76 BPM | HEIGHT: 66 IN | WEIGHT: 263 LBS | BODY MASS INDEX: 42.27 KG/M2 | OXYGEN SATURATION: 96 % | SYSTOLIC BLOOD PRESSURE: 120 MMHG

## 2024-09-20 DIAGNOSIS — G47.33 OSA (OBSTRUCTIVE SLEEP APNEA): ICD-10-CM

## 2024-09-20 DIAGNOSIS — G47.33 OBSTRUCTIVE SLEEP APNEA: Primary | ICD-10-CM

## 2024-09-20 PROCEDURE — 3074F SYST BP LT 130 MM HG: CPT | Performed by: NURSE PRACTITIONER

## 2024-09-20 PROCEDURE — 99204 OFFICE O/P NEW MOD 45 MIN: CPT | Performed by: NURSE PRACTITIONER

## 2024-09-20 PROCEDURE — 3079F DIAST BP 80-89 MM HG: CPT | Performed by: NURSE PRACTITIONER

## 2024-09-20 PROCEDURE — 1036F TOBACCO NON-USER: CPT | Performed by: NURSE PRACTITIONER

## 2024-09-20 PROCEDURE — 99214 OFFICE O/P EST MOD 30 MIN: CPT | Performed by: NURSE PRACTITIONER

## 2024-09-20 PROCEDURE — 3008F BODY MASS INDEX DOCD: CPT | Performed by: NURSE PRACTITIONER

## 2024-09-20 ASSESSMENT — SLEEP AND FATIGUE QUESTIONNAIRES
HOW LIKELY ARE YOU TO NOD OFF OR FALL ASLEEP WHILE WATCHING TV: MODERATE CHANCE OF DOZING
HOW LIKELY ARE YOU TO NOD OFF OR FALL ASLEEP WHILE SITTING AND TALKING TO SOMEONE: WOULD NEVER DOZE
HOW LIKELY ARE YOU TO NOD OFF OR FALL ASLEEP WHEN YOU ARE A PASSENGER IN A CAR FOR AN HOUR WITHOUT A BREAK: WOULD NEVER DOZE
SATISFACTION_WITH_CURRENT_SLEEP_PATTERN: DISSATISFIED
SITING INACTIVE IN A PUBLIC PLACE LIKE A CLASS ROOM OR A MOVIE THEATER: WOULD NEVER DOZE
DIFFICULTY_FALLING_ASLEEP: MODERATE
HOW LIKELY ARE YOU TO NOD OFF OR FALL ASLEEP WHILE SITTING AND READING: SLIGHT CHANCE OF DOZING
WAKING_TOO_EARLY: SEVERE
SLEEP_PROBLEM_INTERFERES_DAILY_ACTIVITIES: MUCH
ESS-CHAD TOTAL SCORE: 5
WORRIED_DISTRESSED_DUE_TO_SLEEP: MUCH
HOW LIKELY ARE YOU TO NOD OFF OR FALL ASLEEP IN A CAR, WHILE STOPPED FOR A FEW MINUTES IN TRAFFIC: WOULD NEVER DOZE
HOW LIKELY ARE YOU TO NOD OFF OR FALL ASLEEP WHILE LYING DOWN TO REST IN THE AFTERNOON WHEN CIRCUMSTANCES PERMIT: MODERATE CHANCE OF DOZING
DIFFICULTY_STAYING_ASLEEP: SEVERE
HOW LIKELY ARE YOU TO NOD OFF OR FALL ASLEEP WHILE SITTING QUIETLY AFTER LUNCH WITHOUT ALCOHOL: WOULD NEVER DOZE
SLEEP_PROBLEM_NOTICEABLE_TO_OTHERS: A LITTLE

## 2024-09-20 ASSESSMENT — PATIENT HEALTH QUESTIONNAIRE - PHQ9
SUM OF ALL RESPONSES TO PHQ9 QUESTIONS 1 AND 2: 0
1. LITTLE INTEREST OR PLEASURE IN DOING THINGS: NOT AT ALL
2. FEELING DOWN, DEPRESSED OR HOPELESS: NOT AT ALL

## 2024-09-20 ASSESSMENT — ENCOUNTER SYMPTOMS
OCCASIONAL FEELINGS OF UNSTEADINESS: 0
LOSS OF SENSATION IN FEET: 0
DEPRESSION: 0

## 2024-09-20 ASSESSMENT — PAIN SCALES - GENERAL: PAINLEVEL: 0-NO PAIN

## 2024-09-20 NOTE — ASSESSMENT & PLAN NOTE
Severe per last sleep study at [facility] in 2021  On CPAP per [facility]. No download to review today. Requesting Karoline call [facility] to send one over.  Also weasr 3L/min bleed in. Unclear of reason for addition of bleed in, possibly asthma or afib symptoms.   -Will repeat sleep study with split night protocol given weight loss and oxygen desaturations tracked on apple watch and determine current BRIDGER severity, current air pressure needs on CPAP and oxygen bleed in needs. She is agreeable. A home sleep study would not be sufficient evaluation in her case due to complexity of respiratory equipment needs and determination of appropriate treatment settings. She already has a diagnosis of BRIDGER established, which a HSAT would only prove to re-establish.   -Plan for follow up in Jan after testing completed. Will call her when download received if immediate adjustments are determined to be needed. She is agreeable   -Requesting oRck send N30i instead of current traditional nasal. She is interested in smaller interface.  -Discussed Inspire and BMI limitations. She hopes to lose weight.

## 2024-09-20 NOTE — PATIENT INSTRUCTIONS
[facility] Sleep Medicine  [facility] [address]  [facility]  [address]  [facility] [address]       NAME: Betzaida Jc   DATE:  09/20/24    DIAGNOSIS:   1. BRIDGER (obstructive sleep apnea)  Referral to Adult Sleep Medicine    Positive Airway Pressure (PAP) Therapy    In-Center Sleep Study (Sleep Provider Only)          Thank you for coming to the Sleep Medicine Clinic today! Your sleep medicine provider today was: Mila Goodman, SONALI-CNP Below is a summary of your treatment plan, other important information, and our contact numbers:    TREATMENT PLAN:   - Follow-up in 2-3 months.  - If not already done, sign up for 'My Chart' and send prescription requests or messages through this      Scheduling a Sleep Study    Call the  Sleep Testing Center to speak with a sleep testing  to book your overnight sleep study procedure at one of our adult and pediatric-friendly sleep labs. Overnight sleep studies may be scheduled on a weekday or weekend.    We have child life services on a case by case basis at the [facility] location. We also perform daytime testing for shift workers on a case by case basis.    Locations for sleep studies are: [facility], [facility] ([facility]), [facility], [facility], [place], [place].    Bring your usual medications and nightly routine items for your sleep study. In order to fall asleep faster in sleep lab, we advise patients to wake up earlier on the morning of the scheduled testing and avoid napping prior to testing. Sometimes, your provider may prescribe a sleep aid to be taken at lights out in the sleep lab. If you are taking a sleep aid, please have somebody pick you up after the sleep testing.    Results of your sleep study will be given to the ordering clinician. Please contact their office for results or follow up as directed by your clinician.    For additional information about the  sleep medicine services, please call [phone]       Instructions - Common BRIDGER Recs: - For your sleep apnea, continue to use your PAP every night and use it whenever you are sleeping.   - Avoid alcohol or sedatives several hours prior to sleeping.   - Get additional supplies for your PAP (e.g., mask, hose, filters) every 3 months or as your insurance allows from your [employer] company. Replacement cushions for your PAP mask can be requested monthly if airseals are an issue.  - Remember to clean your mask, tubings, and water chamber regularly as instructed.  - Avoid driving or operating heavy machinery when drowsy. A person driving while sleepy is five (5) times more likely to have an accident. If you feel sleepy, pull over and take a short power nap (sleep for less than 30 minutes). Otherwise, ask somebody to drive you.    EASY WAYS TO IMPROVE YOUR SLEEP:  1. Go to bed and wake up at the same time every day.   Aim for 8 hours but some people need less, some need more.   Get out of bed if you are not sleeping.   Limit naps to 20 min or less.   2. Expose yourself to daylight and/ or bright light in the morning.   Go outside or spend time near a window each morning.   You can use a light box (found on Amazon) if you wake before the sunrise.   Limit light exposure in the evenings (including electronic usage).   Try meditation, reading, stretching, deep breathing, warm shower or bath, or yoga nidra as part of your bedtime routine. There are many great FREE, videos or audio tracks on YouTube/ [employer], etc for guidance.  3. Exercise, in some form, EVERY day, but not too close to bedtime. Consider making this part of your routine at the start of your day, followed by a cool shower.  4. Eat meals at roughly the same time every day. Make sure you are prioritizing fruits, vegetables, whole grains, lean proteins.  5. Time your caffeine intake. Make sure you are not drinking caffeine within 8 to 12 hours prior to your bedtime.   6.  Avoid marijuana, alcohol, and nicotine. They will reduce sleep quality in any quantity.  7. Learn to manage anxiety. Psychology services at  can be reached at [phone] to schedule an appointment.     IMPORTANT INFORMATION:     Call 911 for medical emergencies.  Our offices are generally open from Monday-Friday, 9 am - 5 pm.  If you need to get in touch with me, you may either call me and my team(number is below) or you can use [employer].  If a referral for a test, for CPAP, or for another specialist was made, and you have not heard about scheduling this within a week, please call scheduling at [phone]).  If you are unable to make your appointment for clinic or an overnight study, kindly call the office at least 48 hours in advance to cancel and reschedule.  If you are on CPAP, please bring your device's card to each clinic appointment unless told otherwise by your provider.  There are no supporting services by either the sleep doctors or their staff on weekends and Holidays, or after 5 PM on weekdays.   If you have been asked to come to a sleep study, make sure you bring toiletries, a comfy pillow, and any nighttime medications that you may regularly take. Also be sure to eat dinner before you arrive. We generally do not provide meals.      PRESCRIPTIONS:  We require 7 days advanced notice for prescription refills. If we do not receive the request in this time, we cannot guarantee that your medication will be refilled in time. Please contact the sleep nurses listed below for refills or request via [employer].     IMPORTANT PHONE NUMBERS:   Sleep Medicine Clinic Fax: [phone]  Appointments (for Pediatric Sleep Clinic): [phone] - option 1  Appointments (for Adult Sleep Clinic): [phone] - option 2  Appointments (For Sleep Studies): [phone] - option 3  Behavioral Sleep Medicine: [phone]  Sleep Surgery: [phone]  ENT (Otolaryngology): [phone]  Headache  Clinic (Neurology): [phone]  Neurology: [phone]  Psychiatry: [phone]  Pulmonary Function Testing (PFT) Center: [phone]  Pulmonary Medicine: [phone]  [employer] (DME): [phone]  [employer] (DME): [phone]  [facility] [phone]    Our Adult Sleep Medicine Team (Please do not hesitate to call the office or sleep nurse with any questions between appointments):    Adult Sleep Nurses (Meghan Salguero, UMBERTO and Karoline Gama RN):  For clinical questions and refilling prescriptions: [phone]  Email sleep diaries and other documents at: [email or web address]    Adult Sleep Medicine Secretaries:  Kimberly Montes (For Miguel/Rendon/Krise/Strohl/Yeh): P: [phone]  F: [phone]  Terrell Smith (Maxine)Office: [phone] option 4 Office Fax: [phone]  Crista Bowman (For Hill): P: [phone]  Fax: [phone]  Marleen De La Cruz (For Jurcevic/Blank): P: [phone]  F: [phone]  Lexi Javier (For [place]): P: [phone]  F: [phone]  Wilma Regalado (For Deb/Lamine/Zakhchristine): P: [phone]  F: [phone]  Cande Roach (For Schuyler/Gabriel): P: [phone]  F: [phone]     Adult Sleep Medicine Advanced Practice Providers:  Irwin Bridges ([place], [place])  Latoya Raman ([facility])  Mila Goodman CNP ([facility], [facility])  Cecille Eldridge, CNP ([place], [place], [facility])  Blanca Chapman ([facility], [place], [facility])  Dax Rios CNP ([facility])      Our Sleep Testing Center (STC) Locations:  Our team will contact you to schedule your sleep study, however, you can contact us as follow:  Main Phone Line (scheduling only): [phone]), option 3  Adult and Pediatric Locations  [facility] (6 years and older): [facility] - 4th floor ([address] [place] [address]) After hours line: [phone]  [facility]  "[place] (Main campus: All ages): [facility], 6th floor. After hours line: [phone]   [place] (5 years and older; younger considered on case-by-case basis): [address]; [address], [address]. Scheduling  After hours line: [phone]   [place] (6 years and older): [address]; [address]; [address]   [place] (6 years and older): [address] [facility], [address]  After hours line: [phone]   [facility] (13 years and older) in [place]: [address], 2nd floor  After hours line: [phone]   [place] (13 year and older): [address]  After hours line: [phone] (Home studies out of [facility])    Adult Only Locations:   [place] (18 years and older): [address] [facility], 2nd floor   [place] (18 years and older): [address] [facility]; 4th floor  After hours line: [phone]   [place] (18 years and older) at [place]: [address] [facility]  After hours line: [phone]        CONTACTING YOUR SLEEP MEDICINE PROVIDER:  Send a message directly to your provider through \"My Chart\", which is the email service through your  Records Account: [email or web address]   Call [phone] and leave a message. One of the administrative assistants will forward the message to your sleep medicine provider through \"My Chart\" and/or email.     Your sleep medicine provider for this visit was: Mila Goodman, SONALI-CNP    In the event that you are running more than 15 minutes late to your appointment, I will kindly ask you to reschedule.       "

## 2024-09-20 NOTE — PROGRESS NOTES
Patient: Betzaida Jc    [id number]  : 1970 -- AGE 54 y.o.    Provider: HAMIDA Blanton     Location [facility]   Service Date: 2024              [facility] Sleep Medicine Clinic  New Visit Note      HISTORY OF PRESENT ILLNESS     The patient's referring provider is: Stuart Hodge,     HISTORY OF PRESENT ILLNESS   Betzaida Jc is a 54 y.o. female who presents to a [facility] Sleep Medicine Clinic for a sleep medicine evaluation with concerns of BRIDGER. She works at [employer].    The patient has h/o atrial fibrillation, HTN, DVT, PE, obesity, GERD, hernia, arthritis, TIA, asthma, BRIDGER.    Past Sleep History  Patient has had several sleep studies in the past. Last was completed in  split night sleep study at [facility] per Dr. Dorsey. AHI was 68.6 with and SpO2 christo of 86%. CPAP was titrated from 5 to 10 cwp with recommendation for CPAP at 9 cwp.     States she was set up with CPAP at that time and has work consistently since. Notes oxygen bleed in was added some time last year per Dr. Dorsey for unclear reasons. DME [facility]     Current History    On today's visit, the patient reports recently meeting Dr. Hodge who recommended BRIDGER reevaluation. States she likely would benefit from repeat sleep study as she probably does not need a bleed in anymore. She is willing to repeat sleep testing. She is not sure why oxygen was ordered. Notes she has lost approximately 40 lb since the start of the year. Hoping to lose a little bit more. She is taking metformin and Mounjaro. Notes she has little appetite.   She sometimes does not take her CPAP when she travels. Wears nasal mask. Asking if there is any other option to use. Asking if she would be a candidate for Inspire. Does not wear her oxygen bleed in consistently. Supposed to run at 3L/min. States her oxygen levels on her watch are tracking well unless she lays flat in her  bed. Typically sleeps with her head elevated.     Sleep schedule  on weekdays / work days:  Usual Bedtime:    7:30 PM  Falls asleep around:  8:30 PM  # of awakenings: several bathroom trips   Wake time:  2:55 AM     Naps: after work occasionally    Preferred sleeping position: side     Sleep-related ROS:    Problems going to sleep: No problems going to sleep    Problems staying asleep Problems Staying Asleep: nocturia, breathing problems, palpitations, and environment    Breathing during sleep: snoring, witnessed apneas, gasping/choking for air, night sweats, wake with palpitations, and nocturnal reflux symptoms    Daytime Symptoms  On awakening patient reports: wake unrefreshed, morning headaches, and morning dry mouth    RLS screen:  RLSSCREEN: - Sensations: Patient has unusual sensations in their extremities that cause an urge to move them   - Frequency: frequently   - Relief: Symptoms ARE/ARENOT: are relieved with movement   - Circadian: Symptoms ARE/ARENOT: are not typically improved later in the night.   - Movement: Patient has not been told that their legs kick or jerk during sleep    Feels she needs to stretch her legs. Notes she is on her feet all day at work an needs knee replacements. Hoping to have left knee done if she can lose a little more weight.     Sleep-related behaviors:   dreams upon falling asleep  Restless sleep     Fatigue: symptoms bothersome, but easily able to carry out all usual work/school/family activities    ESS: 5   PRATIK: 18  FOSQ:  30      REVIEW OF SYSTEMS     REVIEW OF SYSTEMS  Review of Systems   All other systems reviewed and are negative.        ALLERGIES AND MEDICATIONS     ALLERGIES  Allergies   Allergen Reactions    Penicillins Other, Shortness of breath and Unknown     asthma    Sulfa (Sulfonamide Antibiotics) Other     Asthma        MEDICATIONS  Current Outpatient Medications   Medication Sig Dispense Refill    albuterol 90 mcg/actuation inhaler every 4 hours.      apixaban  "(Eliquis) 5 mg tablet Take 1 tablet (5 mg) by mouth twice a day.      atorvastatin (Lipitor) 40 mg tablet Take 1 tablet (40 mg) by mouth once daily.      benralizumab (Fasenra) 30 mg/mL injection Inject 1 Syringe (30 mg) under the skin every 28 (twenty-eight) days. 1 each 2    benralizumab (Fasenra) 30 mg/mL injection Inject 1 mL (30 mg) under the skin every 8 (eight) weeks. 1 mL 5    diclofenac sodium (Voltaren) 1 % gel gel Apply 1 Application topically 4 times a day as needed (pain). 100 g 0    flecainide (Tambocor) 100 mg tablet Take 1 tablet (100 mg) by mouth 2 times a day.      linaCLOtide (Linzess) 145 mcg capsule Take 1 tablet by mouth once daily as needed.      lisinopril 40 mg tablet Take 1 tablet (40 mg) by mouth once daily.      loratadine (Claritin) 10 mg tablet Take 1 tablet (10 mg) by mouth once daily.      meclizine (Antivert) 25 mg tablet Take 1 tablet (25 mg) by mouth every 12 hours if needed.      metFORMIN (Glucophage) 500 mg tablet TAKE 1 TABLET BY MOUTH TWICE A DAY WITH A MEAL FOR 30 DAYS      metoprolol tartrate (Lopressor) 100 mg tablet Take 1 tablet (100 mg) by mouth 2 times a day.      omeprazole (PriLOSEC) 40 mg DR capsule 1 capsule (40 mg) once every 24 hours.      ondansetron (Zofran) 4 mg tablet Take 1 tablet (4 mg) by mouth every 6 hours if needed for nausea or vomiting.      tirzepatide (Mounjaro) 2.5 mg/0.5 mL pen injector Inject 2.5 mg under the skin every 7 days.      torsemide (Demadex) 20 mg tablet Take 1 tablet (20 mg) by mouth once daily.       No current facility-administered medications for this visit.         PAST HISTORY     PAST MEDICAL HISTORY  Past Medical History:   Diagnosis Date    Asthma ([facility])     \"Severe persistent asthma\" per pulmonology    Chest pain     Numerous work ups for CP.    Chronic allergic rhinitis     Chronic constipation     Collapse of right lung     Chronic RML collapse with associated bronchiectasis, seen on CT    Dependence on nocturnal oxygen " therapy     2-3 L O2 nightly    Diverticulosis     Hiatal hernia with GERD     4cm per EGD 2023    History of venous thromboembolism 2010    6-8 weeks post L Achilles repair, LE DVT and unilateral PE. Treated w/ anticoagulation, then found to have possible PE in other lung    Hyperlipidemia     Hypertension     Migraine     Morbid obesity with BMI of 45.0-49.9, adult (Multi)     BRIDGER (obstructive sleep apnea)     partially CPAP compliant w/ 2-3L O2 bleed in    Paroxysmal atrial fibrillation (Multi)     Right renal stone     6 mm stone midpole caliceal system right kidney without obstruction, last seen 2023       PAST SURGICAL HISTORY:  Past Surgical History:   Procedure Laterality Date    ACHILLES TENDON SURGERY Left 2010    BI BREAST RIGHT CORE NEEDLE BIOPSY       SECTION, CLASSIC      COLONOSCOPY      CT ANGIO CORONARY ART WITH HEARTFLOW IF SCORE >30%  2022    CT HEART CORONARY ANGIOGRAM 2022 DOCTOR OFFICE LEGACY    ESOPHAGOSCOPY / EGD  2023    KNEE ARTHROSCOPY W/ DEBRIDEMENT Right     MR HEAD ANGIO WO IV CONTRAST  2019    MR HEAD ANGIO WO IV CONTRAST 2019 [facility] ANCILLARY LEGACY    MR NECK ANGIO WO IV CONTRAST  2019    MR NECK ANGIO WO IV CONTRAST 2019 [facility] ANCILLARY LEGACY    PARTIAL HYSTERECTOMY  2006    fibroids/bleeding    SHOULDER ARTHROSCOPY W/ ROTATOR CUFF REPAIR Left 2020    TOE SURGERY Right 2023    Digital arthroplasties with pin fixation, toes 2, 3, right foot.       FAMILY HISTORY  Family History   Problem Relation Name Age of Onset    Thyroid cancer Mother      Kidney cancer Father      Diabetes Other      Hypertension Other      Stroke Other         SOCIAL HISTORY  She  reports that she has never smoked. She has never used smokeless tobacco. She reports current alcohol use of about 2.0 standard drinks of alcohol per week. She reports that she does not use drugs. She currently lives alone.     Caffeine consumption:   Alcohol  "consumption: occasionally  Smoking: none  Marijuana:     PHYSICAL EXAM     VITAL SIGNS: /80 (BP Location: Left arm, Patient Position: Sitting, BP Cuff Size: Large adult)   Pulse 76   Ht 1.676 m (5' 6\")   Wt 119 kg (263 lb)   SpO2 96%   BMI 42.45 kg/m²      PREVIOUS WEIGHTS:  Wt Readings from Last 3 Encounters:   09/20/24 119 kg (263 lb)   08/09/24 119 kg (263 lb)   08/09/24 118 kg (260 lb)       Physical Exam  Physical Exam   Constitutional: Alert and oriented, cooperative, no obvious distress   HEENT: Non icteric or anemic, EOM WNL bilaterally   Neck: Supple, no JVD, no goiter, no adenopathy, no rigidity  Chest: CTA bilaterally, no wheezing, crackles, rubs   Cardiac: RRR, S1 and S2, no murmur, rub, thrill   Abdomen: Obese, Soft, nontender, no masses, no organomegaly   Extremities: No clubbing, no LL edema   Neuromuscular: Cranial nerves grossly intact, no focal deficits        RESULTS/DATA     Bicarbonate (mmol/L)   Date Value   07/02/2024 29   05/30/2024 27   04/30/2024 30       No results found for this or any previous visit from the past 365 days.       Failed to redirect to the Timeline version of the [employer] SmartLink.        ASSESSMENT/PLAN     Ms. Jc is a 54 y.o. female and She was referred to the [facility] Sleep Medicine Clinic for evaluation of BRIDGER    Problem List and Orders  Problem List Items Addressed This Visit             ICD-10-CM    Obstructive sleep apnea - Primary G47.33     [facility]  On CPAP per [facility]. No download to review today. Requesting Karoline call [facility] to send one over.  Also weasr 3L/min bleed in. Unclear of reason for addition of bleed in, possibly asthma or afib symptoms.   -Will repeat sleep study with split night protocol given weight loss and oxygen desaturations tracked on apple watch and determine current BRIDGER severity, current air pressure needs on CPAP and oxygen bleed in needs. She is agreeable. A home sleep study would " not be sufficient evaluation in her case due to complexity of respiratory equipment needs and determination of appropriate treatment settings. She already has a diagnosis of BRIDGER established, which a HSAT would only prove to re-establish.   -Plan for follow up in Jan after testing completed. Will call her when download received if immediate adjustments are determined to be needed. She is agreeable   -Requesting [employer] send N30i instead of current traditional nasal. She is interested in smaller interface.  -Discussed Inspire and BMI limitations. She hopes to lose weight.          BMI 40.0-44.9, adult (Multi) Z68.41     Continue gradual weight loss  Follow with PCP for further recommendations           Other Visit Diagnoses         Codes    BRIDGER (obstructive sleep apnea)     G47.33    Relevant Orders    Positive Airway Pressure (PAP) Therapy    In-Center Sleep Study (Sleep Provider Only)               RTC in Jan

## 2024-10-10 ENCOUNTER — TELEPHONE (OUTPATIENT)
Dept: RESPIRATORY THERAPY | Facility: CLINIC | Age: 54
End: 2024-10-10
Payer: COMMERCIAL

## 2024-10-10 NOTE — TELEPHONE ENCOUNTER
Pt just called to the [facility] stating that she went to urgent care. She said her bronchitis was acting up. Urgent care told her she might need a scan done and to reach out to her pulmonary doctor about it.

## 2024-10-11 DIAGNOSIS — J45.50 SEVERE PERSISTENT ASTHMA, UNSPECIFIED WHETHER COMPLICATED (MULTI): Primary | ICD-10-CM

## 2024-10-17 ENCOUNTER — APPOINTMENT (OUTPATIENT)
Dept: INFUSION THERAPY | Facility: CLINIC | Age: 54
End: 2024-10-17
Payer: COMMERCIAL

## 2024-10-17 VITALS
RESPIRATION RATE: 16 BRPM | TEMPERATURE: 97.1 F | BODY MASS INDEX: 40.96 KG/M2 | DIASTOLIC BLOOD PRESSURE: 65 MMHG | HEART RATE: 81 BPM | WEIGHT: 253.75 LBS | OXYGEN SATURATION: 95 % | SYSTOLIC BLOOD PRESSURE: 100 MMHG

## 2024-10-17 DIAGNOSIS — J45.50 SEVERE PERSISTENT ASTHMA, UNSPECIFIED WHETHER COMPLICATED (MULTI): ICD-10-CM

## 2024-10-17 PROCEDURE — 96372 THER/PROPH/DIAG INJ SC/IM: CPT | Performed by: NURSE PRACTITIONER

## 2024-10-17 RX ORDER — ACETAMINOPHEN 325 MG/1
650 TABLET ORAL ONCE
Status: DISCONTINUED | OUTPATIENT
Start: 2024-10-17 | End: 2024-10-17 | Stop reason: HOSPADM

## 2024-10-17 RX ORDER — FAMOTIDINE 10 MG/ML
20 INJECTION INTRAVENOUS ONCE AS NEEDED
OUTPATIENT
Start: 2024-11-14

## 2024-10-17 RX ORDER — ALBUTEROL SULFATE 0.83 MG/ML
3 SOLUTION RESPIRATORY (INHALATION) AS NEEDED
OUTPATIENT
Start: 2024-11-14

## 2024-10-17 RX ORDER — EPINEPHRINE 0.3 MG/.3ML
0.3 INJECTION SUBCUTANEOUS EVERY 5 MIN PRN
OUTPATIENT
Start: 2024-11-14

## 2024-10-17 RX ORDER — ACETAMINOPHEN 325 MG/1
650 TABLET ORAL ONCE
OUTPATIENT
Start: 2024-11-14

## 2024-10-17 RX ORDER — DIPHENHYDRAMINE HYDROCHLORIDE 50 MG/ML
50 INJECTION INTRAMUSCULAR; INTRAVENOUS AS NEEDED
OUTPATIENT
Start: 2024-11-14

## 2024-10-17 ASSESSMENT — ENCOUNTER SYMPTOMS
NUMBNESS: 0
EXTREMITY WEAKNESS: 0
COUGH: 0
SHORTNESS OF BREATH: 0
LEG SWELLING: 0
WOUND: 0
LIGHT-HEADEDNESS: 0
PALPITATIONS: 0
DIZZINESS: 0
WHEEZING: 0

## 2024-10-17 NOTE — PATIENT INSTRUCTIONS
Today :We administered benralizumab. Fasenra 30mg subcutaneous injection left upper arm  #2     For:   1. Severe persistent asthma, unspecified whether complicated (Multi)       Your next appointment is due in:  4 weeks       Please read the  Medication Guide that was given to you and reviewed during todays visit.     (Tell all doctors including dentists that you are taking this medication)     Go to the emergency room or call 911 if:  -You have signs of allergic reaction:   -Rash, hives, itching.   -Swollen, blistered, peeling skin.   -Swelling of face, lips, mouth, tongue or throat.   -Tightness of chest, trouble breathing, swallowing or talking     Call your doctor:  - If injection site gets red, warm, swollen, itchy or leaks fluid or pus.     (Leave band aid on your injection site for at least 2 hours and keep area clean and dry  - If you get sick or have symptoms of infection or are not feeling well for any reason.    (Wash your hands often, stay away from people who are sick)  - If you have side effects from your medication that do not go away or are bothersome.     (Refer to the teaching your nurse gave you for side effects to call your doctor about)    - Common side effects may include:  stuffy nose, headache, feeling tired, muscle aches, upset stomach  - Before receiving any vaccines     - Call the [facility] at   If:  - You get sick, are on antibiotics, have had a recent vaccine, have surgery or dental work and your doctor wants your visit rescheduled.  - You need to cancel and reschedule your visit for any reason. Call at least 2 days before your visit if you need to cancel.   - Your insurance changes before your next visit.    (We will need to get approval from your new insurance. This can take up to two weeks.)     The [facility] is opened Monday thru Friday. We are closed on weekends and holidays.   Voice mail will take your call if the center is closed. If you leave a  message please allow 24 hours for a call back during weekdays. If you leave a message on a weekend/holiday, we will call you back the next business day.

## 2024-10-17 NOTE — PROGRESS NOTES
[facility]   Infusion Clinic Note   Date: 2024   Name: Betzaida Jc  : 1970   MRN: [id number]          Reason for Visit: Injections (Fasenta 30mg subcutaneous injection/#2)         Today: We administered benralizumab.       Visit Type: INJECTION       Ordered By: Stuart Hodge MD       Accompanied by: Self       Diagnosis: Severe persistent asthma, unspecified whether complicated (Multi)        Allergies:   Allergies as of 10/17/2024 - Reviewed 10/17/2024   Allergen Reaction Noted    Penicillins Other, Shortness of breath, and Unknown 10/11/2006    Sulfa (sulfonamide antibiotics) Other 2013          Current Medications has a current medication list which includes the following prescription(s): albuterol, apixaban, atorvastatin, fasenra, benralizumab, diclofenac sodium, flecainide, linaclotide, lisinopril, loratadine, meclizine, metformin, metoprolol tartrate, omeprazole, ondansetron, mounjaro, and torsemide, and the following Facility-Administered Medications: acetaminophen.       Vitals:   Vitals:    10/17/24 0712   BP: 100/65   Pulse: 81   Resp: 16   Temp: 36.2 °C (97.1 °F)   TempSrc: Temporal   SpO2: 95%   Weight: 115 kg (253 lb 12 oz)             Infusion Pre-procedure Checklist:   - Allergies reviewed: yes   - Medications reviewed: yes       - Previous reaction to current treatment: no      Assess patient for the concerns below. Document provider notification as appropriate.  - Active or recent infection with/without current antibiotic use: no  - Recent or planned invasive dental work:  Process of getting implants  - Recent or planned surgeries: no  - Recently received or plans to receive vaccinations: no  - Has treatment related toxicities: no  - Is pregnant:  no      Pain: 0   - Is the pain different from normal: n/a   - Is your Doctor aware:  n/a                Fall Risk Screening: Gely Fall Risk  History of Falling, Immediate or Within 3 Months:  No  Ambulatory Aid: Walks without aid/bedrest/nurse assist  Intravenous Therapy/Heparin Lock: No  Gait/Transferring: Normal/bedrest/immobile  Mental Status: Oriented to own ability       Review Of Systems:  Review of Systems   Respiratory:  Negative for cough, shortness of breath and wheezing.    Cardiovascular:  Negative for chest pain, leg swelling and palpitations.   Skin:  Negative for itching, rash and wound.   Neurological:  Negative for dizziness, extremity weakness, light-headedness and numbness.         ROS completed? Yes      Infusion Readiness:  - Assessment Concerns Related to Infusion: No  - Provider notified: n/a      Document Below Only If Indicated:   New Patient Education:    N/A (returning patient for continuation of therapy. Ongoing education provided as needed.)        Treatment Conditions & Drug Specific Questions:    Benralizumab  (FASENRA)    (Unless otherwise specified on patient specific therapy plan):     DRUG SPECIFIC QUESTIONS:  Are you still taking your maintenance asthma medications (ICS and LABAs)? Yes   (Patient should continue taking maintenance asthmas medications while on fasenra unless otherwise instructed by prescribing provider)      REMINDER:  Recommended Vitals/Observation:  Vitals: Obtain vital signs prior to injection and at end of observation period.   Observation: Patient may leave 30 minutes post after the FIRST injection. Patients may leave immediately after injection for all subsequent injections.        Weight Based Drug Calculations:    WEIGHT BASED DRUGS: NOT APPLICABLE / FLAT DOSE          Initiated By: Neida King LPN

## 2024-11-14 ENCOUNTER — APPOINTMENT (OUTPATIENT)
Dept: INFUSION THERAPY | Facility: CLINIC | Age: 54
End: 2024-11-14
Payer: COMMERCIAL

## 2024-11-14 VITALS
TEMPERATURE: 97 F | HEART RATE: 76 BPM | DIASTOLIC BLOOD PRESSURE: 86 MMHG | OXYGEN SATURATION: 97 % | SYSTOLIC BLOOD PRESSURE: 147 MMHG | RESPIRATION RATE: 18 BRPM

## 2024-11-14 DIAGNOSIS — J45.50 SEVERE PERSISTENT ASTHMA, UNSPECIFIED WHETHER COMPLICATED (MULTI): ICD-10-CM

## 2024-11-14 PROCEDURE — 96372 THER/PROPH/DIAG INJ SC/IM: CPT | Performed by: NURSE PRACTITIONER

## 2024-11-14 RX ORDER — EPINEPHRINE 0.3 MG/.3ML
0.3 INJECTION SUBCUTANEOUS EVERY 5 MIN PRN
Status: CANCELLED | OUTPATIENT
Start: 2024-12-12

## 2024-11-14 RX ORDER — ACETAMINOPHEN 325 MG/1
650 TABLET ORAL ONCE
Status: DISCONTINUED | OUTPATIENT
Start: 2024-11-14 | End: 2024-11-14

## 2024-11-14 RX ORDER — DIPHENHYDRAMINE HYDROCHLORIDE 50 MG/ML
50 INJECTION INTRAMUSCULAR; INTRAVENOUS AS NEEDED
Status: CANCELLED | OUTPATIENT
Start: 2024-12-12

## 2024-11-14 RX ORDER — FAMOTIDINE 10 MG/ML
20 INJECTION INTRAVENOUS ONCE AS NEEDED
Status: CANCELLED | OUTPATIENT
Start: 2024-12-12

## 2024-11-14 RX ORDER — ACETAMINOPHEN 325 MG/1
650 TABLET ORAL ONCE
Status: CANCELLED | OUTPATIENT
Start: 2024-12-12

## 2024-11-14 RX ORDER — ALBUTEROL SULFATE 0.83 MG/ML
3 SOLUTION RESPIRATORY (INHALATION) AS NEEDED
Status: CANCELLED | OUTPATIENT
Start: 2024-12-12

## 2024-11-14 ASSESSMENT — ENCOUNTER SYMPTOMS
NUMBNESS: 0
LIGHT-HEADEDNESS: 0
PALPITATIONS: 0
EXTREMITY WEAKNESS: 0
WOUND: 0
SHORTNESS OF BREATH: 0
DIZZINESS: 1
LEG SWELLING: 0
COUGH: 1
WHEEZING: 0

## 2024-11-14 NOTE — PATIENT INSTRUCTIONS
Today :We administered benralizumab. Fasenra 30mg subcutaneous injection left upper arm    For:   1. Severe persistent asthma, unspecified whether complicated (Multi)         Your next appointment is due in:   Treatment completed      Please read the  Medication Guide that was given to you and reviewed during todays visit.     (Tell all doctors including dentists that you are taking this medication)     Go to the emergency room or call 911 if:  -You have signs of allergic reaction:   -Rash, hives, itching.   -Swollen, blistered, peeling skin.   -Swelling of face, lips, mouth, tongue or throat.   -Tightness of chest, trouble breathing, swallowing or talking     Call your doctor:  - If IV / injection site gets red, warm, swollen, itchy or leaks fluid or pus.     (Leave dressing on your IV site for at least 2 hours and keep area clean and dry  - If you get sick or have symptoms of infection or are not feeling well for any reason.    (Wash your hands often, stay away from people who are sick)  - If you have side effects from your medication that do not go away or are bothersome.     (Refer to the teaching your nurse gave you for side effects to call your doctor about)    - Common side effects may include:  stuffy nose, headache, feeling tired, muscle aches, upset stomach  - Before receiving any vaccines     - Call the [facility] at   If:  - You get sick, are on antibiotics, have had a recent vaccine, have surgery or dental work and your doctor wants your visit rescheduled.  - You need to cancel and reschedule your visit for any reason. Call at least 2 days before your visit if you need to cancel.   - Your insurance changes before your next visit.    (We will need to get approval from your new insurance. This can take up to two weeks.)     The [facility] is opened Monday thru Friday. We are closed on weekends and holidays.   Voice mail will take your call if the center is closed. If you  leave a message please allow 24 hours for a call back during weekdays. If you leave a message on a weekend/holiday, we will call you back the next business day.    A pharmacist is available Monday - Friday from 8:30AM to 3:30PM to help answer any questions you may have about your prescriptions(s). Please call pharmacy at:    [facility]: [phone]  [facility]: [phone]  [facility]: [phone]

## 2024-11-14 NOTE — PROGRESS NOTES
[facility]   Infusion Clinic Note   Date: 2024   Name: Betzaida Jc  : 1970   MRN: [id number]          Reason for Visit: Follow-up and Injections (3rd Fasenra 30mg subcutaneous/Injection )         Today: We administered benralizumab.       Visit Type: INJECTION       Ordered By: Stuart Hodge DO       Accompanied by:Self       Diagnosis: Severe persistent asthma, unspecified whether complicated (Multi)        Allergies:   Allergies as of 2024 - Reviewed 2024   Allergen Reaction Noted    Penicillins Other, Shortness of breath, and Unknown 10/11/2006    Sulfa (sulfonamide antibiotics) Other 2013          Current Medications has a current medication list which includes the following prescription(s): albuterol, apixaban, atorvastatin, fasenra, benralizumab, diclofenac sodium, flecainide, linaclotide, lisinopril, loratadine, meclizine, metformin, metoprolol tartrate, omeprazole, ondansetron, mounjaro, and torsemide, and the following Facility-Administered Medications: acetaminophen.       Vitals:   Vitals:    24 0800   BP: 147/86   Pulse: 76   Resp: 18   Temp: 36.1 °C (97 °F)   TempSrc: Temporal   SpO2: 97%             Infusion Pre-procedure Checklist:   - Allergies reviewed: yes   - Medications reviewed: yes       - Previous reaction to current treatment: no      Assess patient for the concerns below. Document provider notification as appropriate.  - Active or recent infection with/without current antibiotic use:  Yes - recent sinus infection.  Patient educated. Verbalized understanding.  - Recent or planned invasive dental work: no  - Recent or planned surgeries: no  - Recently received or plans to receive vaccinations: no  - Has treatment related toxicities: no  - Is pregnant:  no      Pain: 0   - Is the pain different from normal: no   - Is your Doctor aware:  n/a                Fall Risk Screening: Gely Fall Risk  History of Falling, Immediate  or Within 3 Months: No  Ambulatory Aid: Walks without aid/bedrest/nurse assist  Intravenous Therapy/Heparin Lock: No  Gait/Transferring: Normal/bedrest/immobile  Mental Status: Oriented to own ability       Review Of Systems:  Review of Systems   Respiratory:  Positive for cough. Negative for shortness of breath and wheezing.         Coughing due to sinus    Cardiovascular:  Negative for chest pain, leg swelling and palpitations.   Skin:  Negative for itching, rash and wound.   Neurological:  Positive for dizziness. Negative for extremity weakness, light-headedness and numbness.        Chronic dizziness.  H/O:  vertigo         ROS completed? Yes      Infusion Readiness:  - Assessment Concerns Related to Infusion: No  - Provider notified: n/a      Document Below Only If Indicated:   New Patient Education:    N/A (returning patient for continuation of therapy. Ongoing education provided as needed.)        Treatment Conditions & Drug Specific Questions:    Benralizumab  (FASENRA)    (Unless otherwise specified on patient specific therapy plan):     DRUG SPECIFIC QUESTIONS:  Are you still taking your maintenance asthma medications (ICS and LABAs)? Yes   (Patient should continue taking maintenance asthmas medications while on fasenra unless otherwise instructed by prescribing provider)      REMINDER:  Recommended Vitals/Observation:  Vitals: Obtain vital signs prior to injection and at end of observation period.   Observation: Patient may leave 30 minutes post after the FIRST injection. Patients may leave immediately after injection for all subsequent injections.        Weight Based Drug Calculations:    WEIGHT BASED DRUGS: NOT APPLICABLE / FLAT DOSE          Initiated By: Ashley Umanzor LPN

## 2024-11-15 ENCOUNTER — OFFICE VISIT (OUTPATIENT)
Dept: PULMONOLOGY | Facility: CLINIC | Age: 54
End: 2024-11-15
Payer: COMMERCIAL

## 2024-11-15 VITALS
BODY MASS INDEX: 39.37 KG/M2 | WEIGHT: 245 LBS | HEIGHT: 66 IN | DIASTOLIC BLOOD PRESSURE: 82 MMHG | HEART RATE: 78 BPM | SYSTOLIC BLOOD PRESSURE: 128 MMHG | OXYGEN SATURATION: 100 % | TEMPERATURE: 98 F | RESPIRATION RATE: 18 BRPM

## 2024-11-15 DIAGNOSIS — R05.8 UPPER AIRWAY COUGH SYNDROME: Primary | ICD-10-CM

## 2024-11-15 DIAGNOSIS — J45.50 SEVERE PERSISTENT ASTHMA, UNSPECIFIED WHETHER COMPLICATED (MULTI): ICD-10-CM

## 2024-11-15 PROCEDURE — 3008F BODY MASS INDEX DOCD: CPT | Performed by: INTERNAL MEDICINE

## 2024-11-15 PROCEDURE — 1036F TOBACCO NON-USER: CPT | Performed by: INTERNAL MEDICINE

## 2024-11-15 PROCEDURE — 3079F DIAST BP 80-89 MM HG: CPT | Performed by: INTERNAL MEDICINE

## 2024-11-15 PROCEDURE — 99215 OFFICE O/P EST HI 40 MIN: CPT | Performed by: INTERNAL MEDICINE

## 2024-11-15 PROCEDURE — 3074F SYST BP LT 130 MM HG: CPT | Performed by: INTERNAL MEDICINE

## 2024-11-15 RX ORDER — DIPHENHYDRAMINE HYDROCHLORIDE 50 MG/ML
50 INJECTION INTRAMUSCULAR; INTRAVENOUS AS NEEDED
OUTPATIENT
Start: 2024-11-15

## 2024-11-15 RX ORDER — EPINEPHRINE 0.3 MG/.3ML
0.3 INJECTION SUBCUTANEOUS EVERY 5 MIN PRN
OUTPATIENT
Start: 2024-11-15

## 2024-11-15 RX ORDER — FLUTICASONE PROPIONATE 50 MCG
2 SPRAY, SUSPENSION (ML) NASAL DAILY
Qty: 16 G | Refills: 6 | Status: SHIPPED | OUTPATIENT
Start: 2024-11-15 | End: 2025-05-14

## 2024-11-15 RX ORDER — ALBUTEROL SULFATE 0.83 MG/ML
3 SOLUTION RESPIRATORY (INHALATION) AS NEEDED
OUTPATIENT
Start: 2024-11-15

## 2024-11-15 RX ORDER — BUDESONIDE AND FORMOTEROL FUMARATE DIHYDRATE 80; 4.5 UG/1; UG/1
2 AEROSOL RESPIRATORY (INHALATION) 2 TIMES DAILY PRN
Qty: 10.2 G | Refills: 5 | Status: SHIPPED | OUTPATIENT
Start: 2024-11-15 | End: 2025-05-14

## 2024-11-15 RX ORDER — FAMOTIDINE 10 MG/ML
20 INJECTION INTRAVENOUS ONCE AS NEEDED
OUTPATIENT
Start: 2024-11-15

## 2024-11-15 ASSESSMENT — ASTHMA QUESTIONNAIRES
QUESTION_4 LAST FOUR WEEKS HOW OFTEN HAVE YOU USED YOUR RESCUE INHALER OR NEBULIZER MEDICATION (SUCH AS ALBUTEROL): NOT AT ALL
QUESTION_1 LAST FOUR WEEKS HOW MUCH OF THE TIME DID YOUR ASTHMA KEEP YOU FROM GETTING AS MUCH DONE AT WORK, SCHOOL OR AT HOME: NONE OF THE TIME
QUESTION_5 LAST FOUR WEEKS HOW WOULD YOU RATE YOUR ASTHMA CONTROL: WELL CONTROLLED
QUESTION_2 LAST FOUR WEEKS HOW OFTEN HAVE YOU HAD SHORTNESS OF BREATH: 1 OR 2 TIMES PER WEEK
ACT_TOTALSCORE: 19

## 2024-11-15 NOTE — PROGRESS NOTES
Department of Medicine  Division of Pulmonary, Critical Care, and Sleep Medicine  Follow Up  [facility], [address]    Betzaida Jc is a 54 y.o. female who returns as a follow up for asthma. Last visit was on 8/9/2024.    Physician HPI (11/15/2024):  In October she called stating that her bronchitis is acting up and went to urgent care who told her she may need a CT scan.  I had ordered a chest x-ray, which she did not complete. Due to high eosinophil counts and difficult to control symptoms, we started her on benralizumab at last visit.  She had completed all 3 induction doses. She had PFT done which was negative for obstruction but did show hyperinflation.  Her FeNO testing was negative.  She is also followed up with sleep medicine for scheduling her for a repeat PSG, but this may not happen until after December 2024.    She went up going to [facility] emergency room on 10/10/2024 for cough and bodyaches for 3 days.  She was prescribed antibiotics.  Chest x-ray was read as being negative.  Blood work was negative for active infection.  Despite completing Fasenra, she has not noticed an appreciable improvement in her breathing.  She also states that she has been having headaches and sinus congestion and difficulty sleeping night despite wearing her CPAP machine.  She feels that she is suffocating with the mask.  She also endorses that she has lost a significant amount of weight over the past year (50 pounds). She works at [employer] and exposed to multiple people on a daily basis.    Per previous notes:  8/9/2024:  54 y.o. female who is a former patient of Dr. Ambriz. She was following with him for severe asthma, bronchiectasis, pulmonary nodules, BRIDGER.  She also has a past medical history significant for GERD, HFpEF, A-fib, PE, hypertension.     She states she has been on chronic prednisone therapy of 10 mg daily for over 2 years.  Since being started on prednisone, her symptoms have been under  good control.  Triggers include breathing air conditioned air, and changes of seasons.  Last time she saw Dr. Ambriz was in December 2023, and at that time she was maintained on Trelegy Ellipta 200.  She states that she was unable to afford Trelegy and has been only on albuterol as needed.  She also takes Singulair at night.     She is also taking prednisone for ankle arthritis.  This is according to her podiatrist, however, I am unable to ascertain from podiatry's notes if this is indicated for ankle arthritis or not.  She states she used to have PFTs with Dr. Ambriz every 6 months, however, there are no records of spirometry documentation in his notes or in our system.     Her IgE levels have been low.  Her most recent eosinophil count was 110, however, she has had counts as high as 730.     Since being placed on chronic prednisone therapy, she has not had any exacerbations, but was a frequent exacerbater beforehand.     She is a lifetime non-smoker.  She works at [employer].  She also states she has had COVID 5 times.     Of note, she uses CPAP at night, but also uses 3 L/min oxygen bleed in.  She also has a history of nodules which have been stable since 2020.    I have personally reviewed PMH, PSH, Family History in the HISTORY tab of this chart, and unless noted above are not pertinent to the presenting complaint.  I have personally reviewed Social History as provided in the electronic medical record.    Immunization History:  Immunization History   Administered Date(s) Administered    Flu vaccine (IIV4), preservative free *Check age/dose* 08/23/2017, 09/25/2018, 09/19/2019, 09/16/2021    Flu vaccine, quadrivalent, no egg protein, age 6 month or greater (FLUCELVAX) 09/25/2020, 09/20/2022, 09/26/2023    Flu vaccine, trivalent, preservative free, no egg protein, age 18y+ (Flublok) 09/01/2020    Influenza, seasonal, injectable 09/15/2017    Pfizer Gray Cap SARS-CoV-2 01/24/2022, 07/12/2022    Pfizer Purple Cap  "SARS-CoV-2 03/31/2021, 04/29/2021    Pneumococcal conjugate vaccine, 20-valent (PREVNAR 20) 09/26/2023    Pneumococcal polysaccharide vaccine, 23-valent, age 2 years and older (PNEUMOVAX 23) 11/04/2020    Zoster vaccine, recombinant, adult (SHINGRIX) 06/10/2022, 08/21/2022       Current Medications:  Current Outpatient Medications   Medication Instructions    albuterol 90 mcg/actuation inhaler Every 4 hours    apixaban (ELIQUIS) 5 mg, oral, 2 times daily    atorvastatin (LIPITOR) 40 mg, oral, Daily    benralizumab (FASENRA) 30 mg, subcutaneous, Every 8 weeks    diclofenac sodium (Voltaren) 1 % gel gel 1 Application, Topical, 4 times daily PRN    Fasenra 30 mg, subcutaneous, Every 28 days    flecainide (TAMBOCOR) 100 mg, oral, 2 times daily    linaCLOtide (Linzess) 145 mcg capsule 1 tablet, oral, Daily PRN    lisinopril 40 mg, oral, Daily    loratadine (Claritin) 10 mg tablet 1 tablet, oral, Daily    meclizine (ANTIVERT) 25 mg, oral, Every 12 hours PRN    metFORMIN (Glucophage) 500 mg tablet TAKE 1 TABLET BY MOUTH TWICE A DAY WITH A MEAL FOR 30 DAYS    metoprolol tartrate (LOPRESSOR) 100 mg, oral, 2 times daily    Mounjaro 2.5 mg, subcutaneous, Every 7 days    omeprazole (PriLOSEC) 40 mg DR capsule 1 capsule, Every 24 hours    ondansetron (ZOFRAN) 4 mg, oral, Every 6 hours PRN    torsemide (DEMADEX) 20 mg, oral, Daily        Drug Allergies/Intolerances:  Allergies   Allergen Reactions    Penicillins Other, Shortness of breath and Unknown     asthma    Sulfa (Sulfonamide Antibiotics) Other     Asthma         Review of Systems:  Review of Systems     All other review of systems are negative and/or non-contributory.    Physical Examination:  Vitals:    11/15/24 1322   BP: 128/82   BP Location: Right arm   Patient Position: Sitting   Pulse: 78   Resp: 18   Temp: 36.7 °C (98 °F)   SpO2: 100%   Weight: 111 kg (245 lb)   Height: 1.676 m (5' 6\")          GEN: appears overall well  ENT: marked turbinate erythema and edema " bilaterally  CV: RRR, no m/g/r  LUNGS: good effort, clear bilaterally, no w/r/r  EXT: no edema, cyanosis, clubbing  NEURO: strength equal bilaterally, sensation grossly intact        Exacerbation History     2024: URI    Pulmonary Function Test Results     8/15/2024:  FVC: 4.05 L (135%)  FEV1: 3.07 L (128%)  FEV1/FVC: 76  T.31 L (135%)  DLCO: 130%  No significant bronchodilator response.  FeNO: 15ppb    O2 Requirements     6MWT: none    Sleep Study     None    CAT score     N/A    Peak Flow and ACT     11/15/2024: 19  2024: 23     Chest Radiograph     10/10/2024:  Lungs and pleura: Mild bibasilar atelectasis.  No consolidation.    Previously reported nodule/nodular density in the right lower lobe is not   as well-seen on this exam. No pleural effusion. No pneumothorax.     Chest CT Scan     CT angio chest w and wo IV contrast 2024  Evaluation for thromboembolic disease:  No filling defects to suggest acute pulmonary embolism.  The central  pulmonary arteries are within normal size limits.     Lower neck, lymph nodes, and mediastinum:  No pathologically enlarged lymph nodes in the chest. Visualized thyroid  gland is unremarkable. Small hiatal hernia. No acute mediastinal process.     Lung parenchyma, pleura, and airways:  Patent central airways. Eventration of both hemidiaphragms with minimal  compressive bibasilar atelectasis. Stable volume loss and  bronchiolectasis in the anteromedial right lower lobe.  No consolidations  or edema. No suspicious pulmonary nodules.  No pleural effusions.  No  pneumothorax.     Impression  IMPRESSION:  No acute pulmonary embolism.     Small, stable mixed-type hiatal hernia.      2024:  1.  No suspicious pulmonary nodules identified. Scattered 2-4 mm  nodules throughout the lungs have remained stable to at least  2020 and are consistent with benign etiology.  2. No acute intrathoracic pathology. Chronic collapse of the right  middle lobe is unchanged  compared to remote examinations.  3. Additional findings as above.     10/7/2023:  There is adequate contrast opacification of the pulmonary arterial  vasculature. No filling defect or vessel cutoff to indicate pulmonary  embolus.      The heart size is within normal limits.  No pericardial effusion is  identified. The aorta and pulmonary arteries are normal in caliber.  No thoracic aortic dissection      There are no pathologically enlarged mediastinal, hilar, or axillary  lymph nodes are seen.      The trachea and mainstem bronchi are patent. No suspicious pulmonary  nodules are seen. The lungs are clear. There is no evidence of  pneumothorax or pleural effusion.     7/7/2022:  No evidence of acute pulmonary embolism.     6 mm and 7 mm groundglass nodular opacities in the right middle lobe  along the fissure may be infectious/inflammatory etiology; follow-up  CT chest recommended in 6 to months to assess stability/resolution.     4/20/2021:  1.  No CT finding of acute pulmonary embolus or aortic dissection or  change from prior.  2. Chronic atelectasis in the right middle lobe.    Bronchoscopy     None    Labs     Lab Results   Component Value Date    WBC 4.8 09/19/2024    HGB 10.7 (L) 09/19/2024    HCT 34.5 (L) 09/19/2024    MCV 94 09/19/2024     09/19/2024     Lab Results   Component Value Date    BNP 30 06/08/2023     Lab Results   Component Value Date    IGE 18 03/30/2022    EOSABS 0.18 09/19/2024    ICIGE 39.7 03/30/2022       Echocardiogram     No results found for this or any previous visit from the past 365 days.       ASSESSMENT & PLAN     Problem List Items Addressed This Visit       Severe persistent asthma    Relevant Medications    budesonide-formoteroL (Symbicort) 80-4.5 mcg/actuation inhaler    benralizumab (Fasenra) 30 mg/mL injection    benralizumab (Fasenra) 30 mg/mL injection     Other Visit Diagnoses       Upper airway cough syndrome    -  Primary    Relevant Medications    fluticasone  (Flonase) 50 mcg/actuation nasal spray             SUMMARY:  54 y.o. female with severe asthma (eosinophilic type).  Difficult to tell if she has been responding to the benralizumab injections or not due to recent past URI and exacerbation.  Her current symptoms are likely due to persistent upper airway cough syndrome and persistent nasal sinus inflammation.  The difficulties tolerating CPAP may be due to improvements in her BRIDGER due to her recent weight loss.  Her previously noted AHI was over 60. She is pending left knee arthroplasty in February 2025.    Plan:  -Continue on benralizumab q8week maintenance dosing. Therapy plan and Rx updated  -Symbicort PRN  -Flonase for post-nasal drip  -Avoid triggers  -Get PSG    Follow-up: 3/28/2025        Stuart Hodge DO  Staff Physician - Pulmonary & Critical Care  11/15/24 1:27 PM  Office number: [phone]   Fax number:  [phone]

## 2024-11-21 ENCOUNTER — LAB (OUTPATIENT)
Dept: LAB | Facility: LAB | Age: 54
End: 2024-11-21
Payer: COMMERCIAL

## 2024-11-21 DIAGNOSIS — I48.0 PAROXYSMAL ATRIAL FIBRILLATION (MULTI): ICD-10-CM

## 2024-11-21 DIAGNOSIS — M15.9 POLYOSTEOARTHRITIS, UNSPECIFIED: ICD-10-CM

## 2024-11-21 DIAGNOSIS — I11.0 HYPERTENSIVE HEART DISEASE WITH HEART FAILURE: Primary | ICD-10-CM

## 2024-11-21 DIAGNOSIS — E11.8 TYPE 2 DIABETES MELLITUS WITH UNSPECIFIED COMPLICATIONS: ICD-10-CM

## 2024-11-21 DIAGNOSIS — E55.9 VITAMIN D DEFICIENCY, UNSPECIFIED: ICD-10-CM

## 2024-11-21 LAB
25(OH)D3 SERPL-MCNC: 91 NG/ML (ref 30–100)
ALBUMIN SERPL BCP-MCNC: 3.8 G/DL (ref 3.4–5)
ALP SERPL-CCNC: 80 U/L (ref 33–110)
ALT SERPL W P-5'-P-CCNC: 10 U/L (ref 7–45)
ANION GAP SERPL CALC-SCNC: 12 MMOL/L (ref 10–20)
AST SERPL W P-5'-P-CCNC: 12 U/L (ref 9–39)
BILIRUB SERPL-MCNC: 0.4 MG/DL (ref 0–1.2)
BUN SERPL-MCNC: 18 MG/DL (ref 6–23)
CALCIUM SERPL-MCNC: 9.6 MG/DL (ref 8.6–10.6)
CHLORIDE SERPL-SCNC: 104 MMOL/L (ref 98–107)
CK SERPL-CCNC: 137 U/L (ref 0–215)
CO2 SERPL-SCNC: 27 MMOL/L (ref 21–32)
CREAT SERPL-MCNC: 0.8 MG/DL (ref 0.5–1.05)
EGFRCR SERPLBLD CKD-EPI 2021: 89 ML/MIN/1.73M*2
EST. AVERAGE GLUCOSE BLD GHB EST-MCNC: 126 MG/DL
GLUCOSE SERPL-MCNC: 119 MG/DL (ref 74–99)
HBA1C MFR BLD: 6 %
POTASSIUM SERPL-SCNC: 3.9 MMOL/L (ref 3.5–5.3)
PROT SERPL-MCNC: 6.6 G/DL (ref 6.4–8.2)
RHEUMATOID FACT SER NEPH-ACNC: <10 IU/ML (ref 0–15)
SODIUM SERPL-SCNC: 139 MMOL/L (ref 136–145)

## 2024-11-21 PROCEDURE — 86235 NUCLEAR ANTIGEN ANTIBODY: CPT

## 2024-11-21 PROCEDURE — 86038 ANTINUCLEAR ANTIBODIES: CPT

## 2024-11-21 PROCEDURE — 80053 COMPREHEN METABOLIC PANEL: CPT

## 2024-11-21 PROCEDURE — 86431 RHEUMATOID FACTOR QUANT: CPT

## 2024-11-21 PROCEDURE — 82306 VITAMIN D 25 HYDROXY: CPT

## 2024-11-21 PROCEDURE — 83036 HEMOGLOBIN GLYCOSYLATED A1C: CPT

## 2024-11-21 PROCEDURE — 86225 DNA ANTIBODY NATIVE: CPT

## 2024-11-21 PROCEDURE — 36415 COLL VENOUS BLD VENIPUNCTURE: CPT

## 2024-11-21 PROCEDURE — 82550 ASSAY OF CK (CPK): CPT

## 2024-11-26 LAB
ANA PATTERN 2: ABNORMAL
ANA PATTERN: ABNORMAL
ANA SER QL HEP2 SUBST: POSITIVE
ANA TITER 2: ABNORMAL
ANA TITR SER IF: ABNORMAL {TITER}
CENTROMERE B AB SER-ACNC: <0.2 AI
CHROMATIN AB SERPL-ACNC: <0.2 AI
CYTOPLASMIC PATTERN: PRESENT
DSDNA AB SER-ACNC: <1 IU/ML
ENA JO1 AB SER QL IA: <0.2 AI
ENA RNP AB SER IA-ACNC: <0.2 AI
ENA SCL70 AB SER QL IA: <0.2 AI
ENA SM AB SER IA-ACNC: <0.2 AI
ENA SM+RNP AB SER QL IA: <0.2 AI
ENA SS-A AB SER IA-ACNC: <0.2 AI
ENA SS-B AB SER IA-ACNC: <0.2 AI
RIBOSOMAL P AB SER-ACNC: 0.2 AI

## 2024-12-05 ENCOUNTER — OFFICE VISIT (OUTPATIENT)
Dept: ORTHOPEDIC SURGERY | Facility: CLINIC | Age: 54
End: 2024-12-05
Payer: COMMERCIAL

## 2024-12-05 ENCOUNTER — HOSPITAL ENCOUNTER (OUTPATIENT)
Dept: RADIOLOGY | Facility: CLINIC | Age: 54
Discharge: HOME | End: 2024-12-05
Payer: COMMERCIAL

## 2024-12-05 VITALS — BODY MASS INDEX: 39.21 KG/M2 | WEIGHT: 244 LBS | HEIGHT: 66 IN

## 2024-12-05 DIAGNOSIS — M17.12 UNILATERAL PRIMARY OSTEOARTHRITIS, LEFT KNEE: ICD-10-CM

## 2024-12-05 PROCEDURE — 99214 OFFICE O/P EST MOD 30 MIN: CPT | Performed by: PHYSICIAN ASSISTANT

## 2024-12-05 PROCEDURE — 73562 X-RAY EXAM OF KNEE 3: CPT | Mod: LT

## 2024-12-05 RX ORDER — LANCETS 30 GAUGE
EACH MISCELLANEOUS
COMMUNITY
Start: 2024-11-19

## 2024-12-05 RX ORDER — MONTELUKAST SODIUM 10 MG/1
TABLET ORAL
COMMUNITY
Start: 2024-11-08

## 2024-12-05 RX ORDER — LANCETS 33 GAUGE
EACH MISCELLANEOUS
COMMUNITY
Start: 2024-12-02

## 2024-12-05 RX ORDER — BLOOD-GLUCOSE METER
EACH MISCELLANEOUS
COMMUNITY
Start: 2024-11-19

## 2024-12-05 RX ORDER — ERGOCALCIFEROL 1.25 MG/1
1 CAPSULE ORAL
COMMUNITY
Start: 2024-11-08

## 2024-12-05 RX ORDER — METOCLOPRAMIDE 5 MG/1
TABLET ORAL
COMMUNITY
Start: 2024-12-03

## 2024-12-05 RX ORDER — DIPHENHYDRAMINE HCL 25 MG
25-50 CAPSULE ORAL EVERY 4 HOURS PRN
COMMUNITY
Start: 2024-12-02 | End: 2024-12-16

## 2024-12-05 RX ORDER — UBIQUINOL 100 MG
CAPSULE ORAL
COMMUNITY
Start: 2024-11-19

## 2024-12-05 ASSESSMENT — ENCOUNTER SYMPTOMS
NERVOUS/ANXIOUS: 0
LIGHT-HEADEDNESS: 0
FATIGUE: 0
DIFFICULTY URINATING: 0
WOUND: 0
HEMATURIA: 0
COLOR CHANGE: 0
FEVER: 0
RESPIRATORY NEGATIVE: 1
CHEST TIGHTNESS: 0
CARDIOVASCULAR NEGATIVE: 1
CONFUSION: 0
AGITATION: 0
CONSTITUTIONAL NEGATIVE: 1
BLOOD IN STOOL: 0
WHEEZING: 0
COUGH: 0
BACK PAIN: 0
ALLERGIC/IMMUNOLOGIC NEGATIVE: 1
DYSURIA: 0
FREQUENCY: 0
WEAKNESS: 0
ACTIVITY CHANGE: 0
SHORTNESS OF BREATH: 0
NAUSEA: 0
JOINT SWELLING: 1
DIZZINESS: 0
VOMITING: 0
CHILLS: 0
EYES NEGATIVE: 1
ARTHRALGIAS: 0
PALPITATIONS: 0
HEMATOLOGIC/LYMPHATIC NEGATIVE: 1
ENDOCRINE NEGATIVE: 1
ABDOMINAL PAIN: 0

## 2024-12-05 NOTE — PROGRESS NOTES
"      ANDRES Baig, TAYLOR, ATC  Adult Reconstruction and Joint Replacement Surgery  Phone: [phone]     Fax:[phone]            Chief Complaint   Patient presents with    Left Knee - Follow-up, Pain     Discuss Surgery       HPI:      Betzaida Jc is a pleasant 54 y.o. year-old female who is seen today for evaluation of left knee pain.  She is fed up with her quality of life.  She has failed a greater than 3-month conservative treatment including physical therapy, NSAIDs and injections.  She has lost over 60 pounds and now meets criteria for surgery at a BMI of 39.38.  She is currently on Mounjaro.  She is scheduled for a left total knee arthroplasty in February as a [facility] patient.      Review of Systems   Constitutional: Negative.  Negative for activity change, chills, fatigue and fever.   HENT: Negative.     Eyes: Negative.    Respiratory: Negative.  Negative for cough, chest tightness, shortness of breath and wheezing.    Cardiovascular: Negative.  Negative for palpitations.   Gastrointestinal:  Negative for abdominal pain, blood in stool, nausea and vomiting.   Endocrine: Negative.  Negative for cold intolerance and polyuria.   Genitourinary: Negative.  Negative for difficulty urinating, dysuria, frequency, hematuria and urgency.   Musculoskeletal:  Positive for gait problem and joint swelling. Negative for arthralgias and back pain.   Skin: Negative.  Negative for color change, pallor, rash and wound.   Allergic/Immunologic: Negative.  Negative for environmental allergies.   Neurological:  Negative for dizziness, weakness and light-headedness.   Hematological: Negative.    Psychiatric/Behavioral:  Negative for agitation, confusion and suicidal ideas. The patient is not nervous/anxious.    All other systems reviewed and are negative.      There were no vitals filed for this visit.    Past Medical History:   Diagnosis Date    Asthma     \"Severe persistent asthma\" per pulmonology    Chest pain  "    Numerous work ups for CP.    Chronic allergic rhinitis     Chronic constipation     Collapse of right lung     Chronic RML collapse with associated bronchiectasis, seen on CT    Dependence on nocturnal oxygen therapy     2-3 L O2 nightly    Diverticulosis     Hiatal hernia with GERD     4cm per EGD 8/2023    History of venous thromboembolism 06/2010    6-8 weeks post L Achilles repair, LE DVT and unilateral PE. Treated w/ anticoagulation, then found to have possible PE in other lung    Hyperlipidemia     Hypertension     Migraine     Morbid obesity with BMI of 45.0-49.9, adult (Multi)     BRIDGER (obstructive sleep apnea)     partially CPAP compliant w/ 2-3L O2 bleed in    Paroxysmal atrial fibrillation (Multi)     Right renal stone     6 mm stone midpole caliceal system right kidney without obstruction, last seen 6/2023     Patient Active Problem List   Diagnosis    Atrial fibrillation (Multi)    DVT (deep venous thrombosis) (Multi)    Dizziness    Cyst of maxillary sinus    Claudication (CMS-HCC)    Chronic paroxysmal hemicrania, not intractable    Benign lipomatous neoplasm of skin and subcutaneous tissue of right arm    Anterior tibialis tendinitis of right lower extremity    Anal skin tag    Headache    Acute otalgia, right    Abnormal mammogram    Ear pressure, right    Left knee DJD    Effusion of left knee    Other seborrheic keratosis    Inflamed seborrheic keratosis    Incarcerated epigastric hernia    Impingement syndrome of left shoulder    Hypertension    History of DVT (deep vein thrombosis)    Hearing loss of right ear    Head contusion    Hammer toe of left foot    Gastroesophageal reflux disease    Gassiness    Flu syndrome    Fever    Fear of flying    Biceps tendinitis of left upper extremity    Osteoma    Obstructive sleep apnea    Nausea    Nocturia    Obesity    Morbid obesity (Multi)    Liver lesion    Left rotator cuff tear    Pulmonary embolism    S/P abdominal hysterectomy    Shortness of  breath at rest    Shoulder swelling    Stress incontinence in female    Skin changes due to chronic exposure to nonionizing radiation, unspecified    Sinus pressure    TIA (transient ischemic attack)    Trismus    Chest pain of uncertain etiology    Severe persistent asthma    BMI 40.0-44.9, adult (Multi)       Medication Documentation Review Audit       Reviewed by Carla Little LPN (Licensed Nurse) on 12/05/24 at 1346      Medication Order Taking? Sig Documenting Provider Last Dose Status   albuterol 90 mcg/actuation inhaler [id number] Yes every 4 hours. Historical Provider, MD  Active   Alcohol Prep Pads pads, medicated [id number] Yes USE TO TEST BLOOD SUGAR ONCE DAILY Historical Provider, MD  Active   apixaban (Eliquis) 5 mg tablet [id number] Yes Take 1 tablet (5 mg) by mouth twice a day. Historical Provider, MD  Active   atorvastatin (Lipitor) 40 mg tablet [id number] Yes Take 1 tablet (40 mg) by mouth once daily. Historical Provider, MD  Active   benralizumab (Fasenra) 30 mg/mL injection [id number] Yes Inject 1 Syringe (30 mg) under the skin every 28 (twenty-eight) days. Vytas Vaitkus, DO  Active   benralizumab (Fasenra) 30 mg/mL injection [id number] Yes Inject 1 Syringe (30 mg) under the skin every 8 (eight) weeks. Vytas Vaitkus, DO  Active   benralizumab (Fasenra) 30 mg/mL injection [id number] Yes Inject 1 mL (30 mg) under the skin every 8 (eight) weeks. Vytas Vaitkus, DO  Active   budesonide-formoteroL (Symbicort) 80-4.5 mcg/actuation inhaler [id number] Yes Inhale 2 puffs 2 times a day as needed (shortness of breath). Rinse mouth with water after use to reduce aftertaste and incidence of candidiasis. Do not swallow. Vytas Vaitkus, DO  Active   diclofenac sodium (Voltaren) 1 % gel gel [id number] Yes Apply 1 Application topically 4 times a day as needed (pain). Blanca Hernandez MD  Active   diphenhydrAMINE (BENADryl) 25 mg capsule [id number] Yes Take 1-2 capsules (25-50 mg) by mouth every 4 hours if needed.  Historical Provider, MD  Active   ergocalciferol (Vitamin D-2) 1.25 MG (43006 UT) capsule [id number] Yes Take 1 capsule (1,250 mcg) by mouth every 7 days. Historical Provider, MD  Active   flecainide (Tambocor) 100 mg tablet [id number] Yes Take 1 tablet (100 mg) by mouth 2 times a day. Historical Provider, MD  Active   fluticasone (Flonase) 50 mcg/actuation nasal spray [id number] Yes Administer 2 sprays into each nostril once daily. Shake gently. Before first use, prime pump. After use, clean tip and replace cap. Stuart Hodge,   Active   linaCLOtide (Linzess) 145 mcg capsule [id number] Yes Take 1 tablet by mouth once daily as needed. Historical Provider, MD  Active   lisinopril 40 mg tablet [id number] Yes Take 1 tablet (40 mg) by mouth once daily. Historical Provider, MD  Active   loratadine (Claritin) 10 mg tablet [id number] Yes Take 1 tablet (10 mg) by mouth once daily. Historical Provider, MD  Active   meclizine (Antivert) 25 mg tablet [id number] Yes Take 1 tablet (25 mg) by mouth every 12 hours if needed. Historical Provider, MD  Active   metFORMIN (Glucophage) 500 mg tablet [id number] Yes TAKE 1 TABLET BY MOUTH TWICE A DAY WITH A MEAL FOR 30 DAYS Historical Provider, MD  Active   metoclopramide (Reglan) 5 mg tablet [id number] Yes  Historical Provider, MD  Active   metoprolol tartrate (Lopressor) 100 mg tablet [id number] Yes Take 1 tablet (100 mg) by mouth 2 times a day. Historical Provider, MD  Active   montelukast (Singulair) 10 mg tablet [id number] Yes TAKE 1 TABLET (10 MG) BY MOUTH ONCE EVERY 24 HOURS. Historical Provider, MD  Active   omeprazole (PriLOSEC) 40 mg DR capsule [id number] Yes 1 capsule (40 mg) once every 24 hours. Historical Provider, MD  Active   ondansetron (Zofran) 4 mg tablet [id number] Yes Take 1 tablet (4 mg) by mouth every 6 hours if needed for nausea or vomiting. Historical Provider, MD  Active   OneTouch Delica Plus Lancet 33 gauge misc [id number] Yes USE 1 LANCET TO TEST BLOOD SUGAR THREE TIMES  DAILY Historical Provider, MD  Active   OneTouch Verio Flex meter misc [id number] Yes TEST BLOOD SUGAR ONCE DAILY Historical Provider, MD  Active   OneTouch Verio test strips strip [id number] Yes USE TO TEST BLOOD SUGAR ONCE DAILY Historical Provider, MD  Active   tirzepatide (Mounjaro) 2.5 mg/0.5 mL pen injector [id number] Yes Inject 2.5 mg under the skin every 7 days. Historical Provider, MD  Active   torsemide (Demadex) 20 mg tablet [id number] Yes Take 1 tablet (20 mg) by mouth once daily. Mary Severino PA-C  Active                    Allergies   Allergen Reactions    Penicillins Other, Shortness of breath and Unknown     asthma    Sulfa (Sulfonamide Antibiotics) Other     Asthma        Social History     Socioeconomic History    Marital status: Single     Spouse name: Not on file    Number of children: Not on file    Years of education: Not on file    Highest education level: Not on file   Occupational History    Not on file   Tobacco Use    Smoking status: Never    Smokeless tobacco: Never   Vaping Use    Vaping status: Never Used   Substance and Sexual Activity    Alcohol use: Yes     Alcohol/week: 2.0 standard drinks of alcohol     Types: 2 Standard drinks or equivalent per week    Drug use: Never    Sexual activity: Not on file   Other Topics Concern    Not on file   Social History Narrative    Not on file     Social Drivers of Health     Financial Resource Strain: Low Risk  (11/28/2023)    Overall Financial Resource Strain (CARDIA)     Difficulty of Paying Living Expenses: Not hard at all   Food Insecurity: No Food Insecurity (12/1/2024)    Received from [facility]    Hunger Vital Sign     Worried About Running Out of Food in the Last Year: Never true     Ran Out of Food in the Last Year: Never true   Transportation Needs: No Transportation Needs (7/12/2024)    Received from [facility]    PRAPARE - Transportation     Lack of Transportation (Medical): No     Lack of Transportation (Non-Medical):  No   Physical Activity: Inactive (2023)    Exercise Vital Sign     Days of Exercise per Week: 0 days     Minutes of Exercise per Session: 0 min   Stress: No Stress Concern Present (2023)    [place] [employer] - Occupational Stress Questionnaire     Feeling of Stress : Not at all   Social Connections: Socially Isolated (2023)    Social Connection and Isolation Panel [NHANES]     Frequency of Communication with Friends and Family: More than three times a week     Frequency of Social Gatherings with Friends and Family: More than three times a week     Attends Worship Services: Never     Active Member of Clubs or Organizations: No     Attends Club or Organization Meetings: Never     Marital Status:    Intimate Partner Violence: Not At Risk (2023)    Humiliation, Afraid, Rape, and Kick questionnaire     Fear of Current or Ex-Partner: No     Emotionally Abused: No     Physically Abused: No     Sexually Abused: No   Housing Stability: Low Risk  (2023)    Housing Stability Vital Sign     Unable to Pay for Housing in the Last Year: No     Number of Places Lived in the Last Year: 1     Unstable Housing in the Last Year: No       Past Surgical History:   Procedure Laterality Date    ACHILLES TENDON SURGERY Left 2010    BI BREAST RIGHT CORE NEEDLE BIOPSY       SECTION, CLASSIC      COLONOSCOPY      CT ANGIO CORONARY ART WITH HEARTFLOW IF SCORE >30%  2022    CT HEART CORONARY ANGIOGRAM 2022 DOCTOR OFFICE LEGACY    ESOPHAGOSCOPY / EGD  2023    KNEE ARTHROSCOPY W/ DEBRIDEMENT Right     MR HEAD ANGIO WO IV CONTRAST  2019    MR HEAD ANGIO WO IV CONTRAST 2019 [facility] ANCILLARY LEGACY    MR NECK ANGIO WO IV CONTRAST  2019    MR NECK ANGIO WO IV CONTRAST 2019 [facility]CILLARY LEGACY    PARTIAL HYSTERECTOMY  2006    fibroids/bleeding    SHOULDER ARTHROSCOPY W/ ROTATOR CUFF REPAIR Left 2020    TOE SURGERY Right  02/17/2023    Digital arthroplasties with pin fixation, toes 2, 3, right foot.       Body mass index is 39.38 kg/m².    A1C  Lab Results   Component Value Date    HGBA1C 6.0 (H) 11/21/2024    DTKVOSUX5C 126 11/21/2024       Physical Exam:  side: left Knee:  General: Well-appearing female in no acute distress.  Awake, alert and oriented.  Pleasant and cooperative.  Respiratory: Non-labored breathing  Mood: Euthymic   Gait: Antalgic  Assistive Device: no device  Skin: warm, dry and intact without lesions    Tenderness to palpation over anterior and medial, Joint line.  Effusion: mild  ROM Extension: 0 Flexion: 110  Valgus Stress 0 degrees-Negative  Valgus Stress 30 degrees-Negative  Varus Stress 0 degrees-Negative  Valgus Stress 30 degrees-Negative  Instability in the AP plane at 90 degrees No  Lachmans 1+  Anterior Drawer-Negative  Mcmurrays negative  Pain with deep flexion-Negative  Grind-Negative   Patella Crepitus-Negative, Facets nontender  J-Sign- Negative  Patella Apprehension-Negative  Patella Tilt-Negative  Extensor Mechanism-intact, Patellar tendon non-tender  5/5 knee extension  5/5 knee flexion, DF/PF/EHL  SILT in a hamlet/saph/per/tib distribution  Neuro L5-S1 sensation intact bilaterally, No clonus. 2+ symmetric patella and achilles reflexes bilateral.  2+ Femoral/DP/PT pulses bilaterally  Compartments supple and non-tender.  Extremities warm and well perfused.    side: right Knee: Unaffected  No Tenderness to palpation over Joint line.  Effusion: mild  ROM Extension: 0 Flexion: 110  Valgus Stress 0 degrees-Negative  Valgus Stress 30 degrees-Negative  Varus Stress 0 degrees-Negative  Valgus Stress 30 degrees-Negative  Instability in the AP plane at 90 degrees No  Lachmans 1+  Anterior Drawer-Negative  Mcmurrays negative  Pain with deep flexion-Negative  Grind-Negative   Patella Crepitus-Negative, Facets nontender  J-Sign- Negative  Patella Apprehension-Negative  Patella Tilt-Negative  Extensor  Mechanism-intact, Patellar tendon non-tender  5/5 knee extension  5/5 knee flexion, DF/PF/EHL  SILT in a hamlet/saph/per/tib distribution  Neuro L5-S1 sensation intact bilaterally, No clonus. 2+ symmetric patella and achilles reflexes bilateral.  2+ Femoral/DP/PT pulses bilaterally  Compartments supple and non-tender.  Extremities warm and well perfused.      Imaging:    Imaging-4 view xrays of the side: left knee were reviewed and interpreted in clinic today. The findings were reviewed with the patient. There are Advanced joint space narrowing with underlying osteophytic, sclerotic change and cystic changes. No fractures or dislocation. Mild soft tissue swelling and effusion noted.    Impression/Plan:  Betzaida Jc and I discussed the etiology of symptoms.  We discussed in detail a treatment plan that he/she is comfortable with.      Advanced Osteoarthritis side: left Knee. We reviewed an evidence-based approach to osteoarthritis of the knee.    I discussed non-operative treatments for the patients' arthritis in detail and briefly discussed indications for surgery. The patient should try and delay joint replacement surgery for as long as possible. If the patients' joint problem affects their quality of life and cause significant restrictions of their activities, they may want to consider joint replacement surgery. I reviewed the importance of adopting a low impact lifestyle and avoidance of joint overloading activities. We discussed the role of weight related issues and anti-inflammatories, including their risks, and need for regular monitoring for side effects of these medications.     The Patient has elected to proceed with left total knee arthroplasty.  She was given an appointment to follow-up for a surgical consult with Dr.Steven Perrin.  We will need to submit a C9 prior to surgery.    All questions were answered.    Follow-up NORM Genao, BECCAS, PA-C, ATC  Orthopedic Physician Assisant  Adult  Reconstruction and Total Joint Replacement  General Orthopedics  Department of Orthopaedic Surgery  [facility]  [id number] [place] [address]  Chidi messaging preferred

## 2024-12-19 ENCOUNTER — APPOINTMENT (OUTPATIENT)
Dept: ORTHOPEDIC SURGERY | Facility: CLINIC | Age: 54
End: 2024-12-19
Payer: COMMERCIAL

## 2024-12-19 ENCOUNTER — OFFICE VISIT (OUTPATIENT)
Dept: ORTHOPEDIC SURGERY | Facility: CLINIC | Age: 54
End: 2024-12-19
Payer: COMMERCIAL

## 2024-12-19 VITALS — BODY MASS INDEX: 37.77 KG/M2 | HEIGHT: 66 IN | WEIGHT: 235 LBS

## 2024-12-19 DIAGNOSIS — M17.12 PRIMARY OSTEOARTHRITIS OF LEFT KNEE: Primary | ICD-10-CM

## 2024-12-19 PROCEDURE — 99214 OFFICE O/P EST MOD 30 MIN: CPT | Performed by: ORTHOPAEDIC SURGERY

## 2024-12-19 NOTE — PROGRESS NOTES
Patient is a 54-year-old female presents today for discussion of upcoming left total knee arthroplasty having decided to proceed with surgery.  She is on blood thinners for history of atrial fibrillation but does have a history of DVT.  She takes Tylenol for pain relief.  She has had previous injections.  She has a history of previous arthroscopy with Dr. Lamar.  She rates her pain as 10 out of 10.  She has difficulty walking sort of distance or going downstairs.  She has lost a significant amount of weight.  She would like to proceed with total knee arthroplasty which is indicated at this time.    Left knee:  AAOx3, NAD, walks with a moderate antalgic gait  Varus allignment  Range of motion lacks 10 degrees of full extension and flexes to 110 degrees  Stable to varus/valgus/anterior/posterior stress through out the range of motion  Slight laxity with varus stress  Diffuse medial  joint line tenderness to palpation  Moderate effusion  SILT in a hamlet/saph/per/tib distribution  5/5 knee extension/df/pf/ehl  ½ dorsalis pedis and posterior tibial pulse  no popliteal lymphadenopathy  no other overlying lesions  mood: euthymic  Respirations non labored    Plain films were reviewed by myself in clinic today.  They have advanced osteoarthritis of their knee with significant joint space narrowing, bone on bone changes, underlying sclerosis and tricompartmental osteophytic change.    Patient is now failed a greater than 3-month trial of reasonable conservative treatment and wishes proceed with surgery which is indicated at this time.  Discussed the risk benefits alternatives to surgery.  All of their questions were answered.    Procedure: left total knee  [place] [facility] [place]10: M17.12  CPT: 46842  Stay: overnight  Equipment: [facility]    I talked with the patient at length about risks, limitations, benefits and alternatives to total knee replacement today. I reviewed concerns about implant wear, loosening, breakage,  infection and infection prophylaxis, DVT, PE, death and other medical and anesthetic complications of surgery. We talked about the potential for persistent pain following surgery since there are many possible causes for knee and leg pain. The patient was advised that knee replacement will only relieve pain that is coming from the knee. We talked about limited range of motion following knee replacement and the importance of physical therapy and their motivation. We talked about improvements in pain management post-operatively and our accelerated rehab program. We talked about wound healing issues and neurovascular complications of surgery. I reviewed functional and activity restrictions in detail. We discussed the fact that many of our patients are able to go home in 1 day or less depending on their health, mobility, pre-op preparation, individual home situation and personal preference.  The patient has identified their personal goals of their joint replacement surgery and recovery and we have discussed them. These are documented in our [employer] patient engagement platform. In addition, we have discussed the advantages and disadvantages of various implant and fixation options, as well as various surgical approaches.  The basic concepts of the joint replacement procedure has been reviewed with the patient and the patient has been provided the opportunity to see an actual implant either in the office or in our pre-op education class.  All of the patients questions were answered. The patient can call my office to schedule surgery and the pre-op teaching class. I told the patient that they should contact their primary care physician to discuss fitness for surgery.    This note was created using voice recognition software and was not corrected for typographical or grammatical errors.

## 2025-01-09 ENCOUNTER — APPOINTMENT (OUTPATIENT)
Dept: INFUSION THERAPY | Facility: CLINIC | Age: 55
End: 2025-01-09
Payer: COMMERCIAL

## 2025-01-09 VITALS
TEMPERATURE: 96.9 F | DIASTOLIC BLOOD PRESSURE: 83 MMHG | HEART RATE: 79 BPM | RESPIRATION RATE: 16 BRPM | SYSTOLIC BLOOD PRESSURE: 136 MMHG | OXYGEN SATURATION: 96 %

## 2025-01-09 DIAGNOSIS — J45.50 SEVERE PERSISTENT ASTHMA, UNSPECIFIED WHETHER COMPLICATED (MULTI): ICD-10-CM

## 2025-01-09 PROCEDURE — 96372 THER/PROPH/DIAG INJ SC/IM: CPT | Performed by: NURSE PRACTITIONER

## 2025-01-09 RX ORDER — ALBUTEROL SULFATE 0.83 MG/ML
3 SOLUTION RESPIRATORY (INHALATION) AS NEEDED
OUTPATIENT
Start: 2025-03-06

## 2025-01-09 RX ORDER — DIPHENHYDRAMINE HYDROCHLORIDE 50 MG/ML
50 INJECTION INTRAMUSCULAR; INTRAVENOUS AS NEEDED
OUTPATIENT
Start: 2025-03-06

## 2025-01-09 RX ORDER — FAMOTIDINE 10 MG/ML
20 INJECTION INTRAVENOUS ONCE AS NEEDED
OUTPATIENT
Start: 2025-03-06

## 2025-01-09 RX ORDER — EPINEPHRINE 0.3 MG/.3ML
0.3 INJECTION SUBCUTANEOUS EVERY 5 MIN PRN
OUTPATIENT
Start: 2025-03-06

## 2025-01-09 ASSESSMENT — ENCOUNTER SYMPTOMS
NUMBNESS: 0
COUGH: 0
WHEEZING: 0
SHORTNESS OF BREATH: 0
PALPITATIONS: 0
LIGHT-HEADEDNESS: 0
DIZZINESS: 0
LEG SWELLING: 0
WOUND: 0
EXTREMITY WEAKNESS: 0

## 2025-01-09 NOTE — PROGRESS NOTES
[facility]   Infusion Clinic Note   Date: 2025   Name: Betzaida Jc  : 1970   MRN: [id number]          Reason for Visit: Injections (Every 56 days Fasenra 30mg subcutaneous injection)         Today: We administered benralizumab.       Visit Type: INJECTION       Ordered By: Stuart Hodge DO       Accompanied by: Self       Diagnosis: Severe persistent asthma, unspecified whether complicated (Multi)        Allergies:   Allergies as of 2025 - Reviewed 2025   Allergen Reaction Noted   • Penicillins Other, Shortness of breath, and Unknown 10/11/2006   • Sulfa (sulfonamide antibiotics) Other 2013          Current Medications has a current medication list which includes the following prescription(s): albuterol, alcohol prep pads, apixaban, atorvastatin, fasenra, benralizumab, benralizumab, budesonide-formoterol, diclofenac sodium, diphenhydramine, ergocalciferol, flecainide, fluticasone, linaclotide, lisinopril, loratadine, meclizine, metformin, metoclopramide, metoprolol tartrate, montelukast, omeprazole, ondansetron, onetouch delica plus lancet, onetouch verio flex meter, onetouch verio test strips, mounjaro, and torsemide.       Vitals:   Vitals:    25 0758   BP: 136/83   Pulse: 79   Resp: 16   Temp: 36.1 °C (96.9 °F)   TempSrc: Temporal   SpO2: 96%             Infusion Pre-procedure Checklist:   - Allergies reviewed: yes   - Medications reviewed: yes       - Previous reaction to current treatment: no      Assess patient for the concerns below. Document provider notification as appropriate.  - Active or recent infection with/without current antibiotic use: no  - Recent or planned invasive dental work: no  - Recent or planned surgeries: no  - Recently received or plans to receive vaccinations: no  - Has treatment related toxicities: no  - Is pregnant:  no      Pain: 0   - Is the pain different from normal: n/a   - Is your Doctor aware:  n/a                 Fall Risk Screening: Hong Fall Risk  History of Falling, Immediate or Within 3 Months: No  Secondary Diagnosis: Yes  Ambulatory Aid: Walks without aid/bedrest/nurse assist  Intravenous Therapy/Heparin Lock: No  Gait/Transferring: Normal/bedrest/immobile  Mental Status: Oriented to own ability  Hong Fall Risk Score: 15       Review Of Systems:  Review of Systems   Respiratory:  Negative for cough, shortness of breath and wheezing.         H/O asthma   Cardiovascular:  Negative for chest pain, leg swelling and palpitations.   Skin:  Negative for itching, rash and wound.   Neurological:  Negative for dizziness, extremity weakness, light-headedness and numbness.         ROS completed? Yes      Infusion Readiness:  - Assessment Concerns Related to Infusion: No  - Provider notified: n/a      Document Below Only If Indicated:   New Patient Education:    N/A (returning patient for continuation of therapy. Ongoing education provided as needed.)        Treatment Conditions & Drug Specific Questions:    Benralizumab  (FASENRA)    (Unless otherwise specified on patient specific therapy plan):     DRUG SPECIFIC QUESTIONS:  Are you still taking your maintenance asthma medications (ICS and LABAs)? Yes   (Patient should continue taking maintenance asthmas medications while on fasenra unless otherwise instructed by prescribing provider)      REMINDER:  Recommended Vitals/Observation:  Vitals: Obtain vital signs prior to injection and at end of observation period.   Observation: Patient may leave 30 minutes post after the FIRST injection. Patients may leave immediately after injection for all subsequent injections.        Weight Based Drug Calculations:    WEIGHT BASED DRUGS: NOT APPLICABLE / FLAT DOSE          Initiated By: Neida King LPN

## 2025-01-09 NOTE — PATIENT INSTRUCTIONS
Today :We administered benralizumab. Fasenra 30mg subcutaneous injection left upper arm    For:   1. Severe persistent asthma, unspecified whether complicated (Multi)       Your next appointment is due in:  56 days        Please read the  Medication Guide that was given to you and reviewed during todays visit.     (Tell all doctors including dentists that you are taking this medication)     Go to the emergency room or call 911 if:  -You have signs of allergic reaction:   -Rash, hives, itching.   -Swollen, blistered, peeling skin.   -Swelling of face, lips, mouth, tongue or throat.   -Tightness of chest, trouble breathing, swallowing or talking     Call your doctor:  - If injection site gets red, warm, swollen, itchy or leaks fluid or pus.     (Leave band aid on your injection site for at least 2 hours and keep area clean and dry  - If you get sick or have symptoms of infection or are not feeling well for any reason.    (Wash your hands often, stay away from people who are sick)  - If you have side effects from your medication that do not go away or are bothersome.     (Refer to the teaching your nurse gave you for side effects to call your doctor about)    - Common side effects may include:  stuffy nose, headache, feeling tired, muscle aches, upset stomach  - Before receiving any vaccines     - Call the [facility] at   If:  - You get sick, are on antibiotics, have had a recent vaccine, have surgery or dental work and your doctor wants your visit rescheduled.  - You need to cancel and reschedule your visit for any reason. Call at least 2 days before your visit if you need to cancel.   - Your insurance changes before your next visit.    (We will need to get approval from your new insurance. This can take up to two weeks.)     The [facility] is opened Monday thru Friday. We are closed on weekends and holidays.   Voice mail will take your call if the center is closed. If you leave a  message please allow 24 hours for a call back during weekdays. If you leave a message on a weekend/holiday, we will call you back the next business day.    A pharmacist is available Monday - Friday from 8:30AM to 3:30PM to help answer any questions you may have about your prescriptions(s). Please call pharmacy at:    [facility]: [phone]  [facility]: [phone]  [facility]: [phone]

## 2025-01-16 ENCOUNTER — LAB (OUTPATIENT)
Dept: LAB | Facility: LAB | Age: 55
End: 2025-01-16
Payer: COMMERCIAL

## 2025-01-16 DIAGNOSIS — E11.8 TYPE 2 DIABETES MELLITUS WITH UNSPECIFIED COMPLICATIONS: ICD-10-CM

## 2025-01-16 DIAGNOSIS — I11.0 HYPERTENSIVE HEART DISEASE WITH HEART FAILURE: Primary | ICD-10-CM

## 2025-01-16 DIAGNOSIS — M15.9 POLYOSTEOARTHRITIS, UNSPECIFIED: ICD-10-CM

## 2025-01-16 LAB
ALBUMIN SERPL BCP-MCNC: 3.4 G/DL (ref 3.4–5)
ALP SERPL-CCNC: 85 U/L (ref 33–110)
ALT SERPL W P-5'-P-CCNC: 14 U/L (ref 7–45)
ANION GAP SERPL CALC-SCNC: 12 MMOL/L (ref 10–20)
AST SERPL W P-5'-P-CCNC: 14 U/L (ref 9–39)
BILIRUB SERPL-MCNC: 0.3 MG/DL (ref 0–1.2)
BUN SERPL-MCNC: 15 MG/DL (ref 6–23)
CALCIUM SERPL-MCNC: 8.8 MG/DL (ref 8.6–10.6)
CHLORIDE SERPL-SCNC: 108 MMOL/L (ref 98–107)
CO2 SERPL-SCNC: 28 MMOL/L (ref 21–32)
CREAT SERPL-MCNC: 0.66 MG/DL (ref 0.5–1.05)
EGFRCR SERPLBLD CKD-EPI 2021: >90 ML/MIN/1.73M*2
ERYTHROCYTE [DISTWIDTH] IN BLOOD BY AUTOMATED COUNT: 14.3 % (ref 11.5–14.5)
GLUCOSE SERPL-MCNC: 94 MG/DL (ref 74–99)
HCT VFR BLD AUTO: 32.2 % (ref 36–46)
HGB BLD-MCNC: 10 G/DL (ref 12–16)
INR PPP: 1.3 (ref 0.9–1.1)
MCH RBC QN AUTO: 28 PG (ref 26–34)
MCHC RBC AUTO-ENTMCNC: 31.1 G/DL (ref 32–36)
MCV RBC AUTO: 90 FL (ref 80–100)
NRBC BLD-RTO: 0 /100 WBCS (ref 0–0)
PLATELET # BLD AUTO: 338 X10*3/UL (ref 150–450)
POTASSIUM SERPL-SCNC: 4.1 MMOL/L (ref 3.5–5.3)
PROT SERPL-MCNC: 6.4 G/DL (ref 6.4–8.2)
PROTHROMBIN TIME: 14.4 SECONDS (ref 9.8–12.8)
RBC # BLD AUTO: 3.57 X10*6/UL (ref 4–5.2)
SODIUM SERPL-SCNC: 144 MMOL/L (ref 136–145)
WBC # BLD AUTO: 6.1 X10*3/UL (ref 4.4–11.3)

## 2025-01-16 PROCEDURE — 85610 PROTHROMBIN TIME: CPT

## 2025-01-16 PROCEDURE — 85027 COMPLETE CBC AUTOMATED: CPT

## 2025-01-16 PROCEDURE — 80053 COMPREHEN METABOLIC PANEL: CPT

## 2025-01-20 PROBLEM — M17.12 UNILATERAL PRIMARY OSTEOARTHRITIS, LEFT KNEE: Status: ACTIVE | Noted: 2025-01-20

## 2025-01-23 ENCOUNTER — APPOINTMENT (OUTPATIENT)
Dept: SLEEP MEDICINE | Facility: CLINIC | Age: 55
End: 2025-01-23
Payer: COMMERCIAL

## 2025-01-23 ENCOUNTER — APPOINTMENT (OUTPATIENT)
Dept: ORTHOPEDIC SURGERY | Facility: CLINIC | Age: 55
End: 2025-01-23
Payer: COMMERCIAL

## 2025-02-03 ENCOUNTER — HOSPITAL ENCOUNTER (OUTPATIENT)
Dept: RADIOLOGY | Facility: HOSPITAL | Age: 55
Discharge: HOME | End: 2025-02-03
Payer: COMMERCIAL

## 2025-02-03 ENCOUNTER — PRE-ADMISSION TESTING (OUTPATIENT)
Dept: PREADMISSION TESTING | Facility: HOSPITAL | Age: 55
End: 2025-02-03
Payer: COMMERCIAL

## 2025-02-03 ENCOUNTER — EDUCATION (OUTPATIENT)
Dept: ORTHOPEDIC SURGERY | Facility: HOSPITAL | Age: 55
End: 2025-02-03
Payer: COMMERCIAL

## 2025-02-03 VITALS
DIASTOLIC BLOOD PRESSURE: 96 MMHG | OXYGEN SATURATION: 99 % | HEART RATE: 75 BPM | SYSTOLIC BLOOD PRESSURE: 150 MMHG | HEIGHT: 66 IN | WEIGHT: 227.29 LBS | RESPIRATION RATE: 18 BRPM | TEMPERATURE: 98.1 F | BODY MASS INDEX: 36.53 KG/M2

## 2025-02-03 DIAGNOSIS — M17.12 UNILATERAL PRIMARY OSTEOARTHRITIS, LEFT KNEE: ICD-10-CM

## 2025-02-03 DIAGNOSIS — Z01.818 PREOP TESTING: Primary | ICD-10-CM

## 2025-02-03 LAB
ALBUMIN SERPL BCP-MCNC: 4.1 G/DL (ref 3.4–5)
ALP SERPL-CCNC: 89 U/L (ref 33–110)
ALT SERPL W P-5'-P-CCNC: 11 U/L (ref 7–45)
ANION GAP SERPL CALC-SCNC: 12 MMOL/L (ref 10–20)
AST SERPL W P-5'-P-CCNC: 13 U/L (ref 9–39)
BASOPHILS # BLD AUTO: 0.01 X10*3/UL (ref 0–0.1)
BASOPHILS NFR BLD AUTO: 0.2 %
BILIRUB SERPL-MCNC: 0.4 MG/DL (ref 0–1.2)
BUN SERPL-MCNC: 15 MG/DL (ref 6–23)
CALCIUM SERPL-MCNC: 9.4 MG/DL (ref 8.6–10.3)
CHLORIDE SERPL-SCNC: 99 MMOL/L (ref 98–107)
CO2 SERPL-SCNC: 33 MMOL/L (ref 21–32)
CREAT SERPL-MCNC: 0.8 MG/DL (ref 0.5–1.05)
EGFRCR SERPLBLD CKD-EPI 2021: 88 ML/MIN/1.73M*2
EOSINOPHIL # BLD AUTO: 0 X10*3/UL (ref 0–0.7)
EOSINOPHIL NFR BLD AUTO: 0 %
ERYTHROCYTE [DISTWIDTH] IN BLOOD BY AUTOMATED COUNT: 14.1 % (ref 11.5–14.5)
EST. AVERAGE GLUCOSE BLD GHB EST-MCNC: 117 MG/DL
GLUCOSE SERPL-MCNC: 91 MG/DL (ref 74–99)
HBA1C MFR BLD: 5.7 %
HCT VFR BLD AUTO: 36.6 % (ref 36–46)
HGB BLD-MCNC: 11.4 G/DL (ref 12–16)
IMM GRANULOCYTES # BLD AUTO: 0 X10*3/UL (ref 0–0.7)
IMM GRANULOCYTES NFR BLD AUTO: 0 % (ref 0–0.9)
LYMPHOCYTES # BLD AUTO: 1.74 X10*3/UL (ref 1.2–4.8)
LYMPHOCYTES NFR BLD AUTO: 37.6 %
MCH RBC QN AUTO: 27.5 PG (ref 26–34)
MCHC RBC AUTO-ENTMCNC: 31.1 G/DL (ref 32–36)
MCV RBC AUTO: 88 FL (ref 80–100)
MONOCYTES # BLD AUTO: 0.61 X10*3/UL (ref 0.1–1)
MONOCYTES NFR BLD AUTO: 13.2 %
NEUTROPHILS # BLD AUTO: 2.27 X10*3/UL (ref 1.2–7.7)
NEUTROPHILS NFR BLD AUTO: 49 %
NRBC BLD-RTO: ABNORMAL /100{WBCS}
PLATELET # BLD AUTO: 356 X10*3/UL (ref 150–450)
POTASSIUM SERPL-SCNC: 3.8 MMOL/L (ref 3.5–5.3)
PROT SERPL-MCNC: 7.2 G/DL (ref 6.4–8.2)
RBC # BLD AUTO: 4.15 X10*6/UL (ref 4–5.2)
SODIUM SERPL-SCNC: 140 MMOL/L (ref 136–145)
WBC # BLD AUTO: 4.6 X10*3/UL (ref 4.4–11.3)

## 2025-02-03 PROCEDURE — 36415 COLL VENOUS BLD VENIPUNCTURE: CPT

## 2025-02-03 PROCEDURE — 87081 CULTURE SCREEN ONLY: CPT | Mod: BEALAB

## 2025-02-03 PROCEDURE — 73562 X-RAY EXAM OF KNEE 3: CPT | Mod: LT

## 2025-02-03 PROCEDURE — 77073 BONE LENGTH STUDIES: CPT

## 2025-02-03 RX ORDER — CHLORHEXIDINE GLUCONATE 40 MG/ML
SOLUTION TOPICAL DAILY
Qty: 470 ML | Refills: 0 | Status: SHIPPED | OUTPATIENT
Start: 2025-02-03 | End: 2025-02-08

## 2025-02-03 RX ORDER — CHLORHEXIDINE GLUCONATE ORAL RINSE 1.2 MG/ML
15 SOLUTION DENTAL DAILY
Qty: 30 ML | Refills: 0 | Status: SHIPPED | OUTPATIENT
Start: 2025-02-03 | End: 2025-02-05

## 2025-02-03 RX ORDER — FOLIC ACID 1 MG/1
TABLET ORAL DAILY
COMMUNITY

## 2025-02-03 ASSESSMENT — DUKE ACTIVITY SCORE INDEX (DASI)
CAN YOU WALK INDOORS, SUCH AS AROUND YOUR HOUSE: YES
CAN YOU RUN A SHORT DISTANCE: NO
CAN YOU PARTICIPATE IN STRENOUS SPORTS LIKE SWIMMING, SINGLES TENNIS, FOOTBALL, BASKETBALL, OR SKIING: NO
CAN YOU DO YARD WORK LIKE RAKING LEAVES, WEEDING OR PUSHING A MOWER: NO
CAN YOU DO MODERATE WORK AROUND THE HOUSE LIKE VACUUMING, SWEEPING FLOORS OR CARRYING GROCERIES: YES
CAN YOU PARTICIPATE IN MODERATE RECREATIONAL ACTIVITIES LIKE GOLF, BOWLING, DANCING, DOUBLES TENNIS OR THROWING A BASEBALL OR FOOTBALL: NO
CAN YOU TAKE CARE OF YOURSELF (EAT, DRESS, BATHE, OR USE TOILET): YES
CAN YOU HAVE SEXUAL RELATIONS: YES
CAN YOU WALK A BLOCK OR TWO ON LEVEL GROUND: YES
CAN YOU DO HEAVY WORK AROUND THE HOUSE LIKE SCRUBBING FLOORS OR LIFTING AND MOVING HEAVY FURNITURE: NO
CAN YOU DO LIGHT WORK AROUND THE HOUSE LIKE DUSTING OR WASHING DISHES: YES
CAN YOU CLIMB A FLIGHT OF STAIRS OR WALK UP A HILL: YES
DASI METS SCORE: 5.7
TOTAL_SCORE: 24.2

## 2025-02-03 ASSESSMENT — PAIN DESCRIPTION - DESCRIPTORS: DESCRIPTORS: ACHING;THROBBING

## 2025-02-03 ASSESSMENT — PAIN SCALES - GENERAL: PAINLEVEL_OUTOF10: 8

## 2025-02-03 ASSESSMENT — PAIN - FUNCTIONAL ASSESSMENT: PAIN_FUNCTIONAL_ASSESSMENT: 0-10

## 2025-02-03 NOTE — CPM/PAT H&P
"CPM/PAT Evaluation       Name: Betzaida Jc (Betzaida Jc)  /Age: 1970/55 y.o.     In-Person       Chief Complaint: Unilateral primary osteoarthritis, left knee     HPI  Patient is an alert and oriented 55 year old female scheduled for a left total knee arthroplasty on 2025 with Dr. Perrin for unilateral primary osteoarthritis, left knee. She endorses left knee pain that she currently rates at a 8/10 which worsens on ambulation and activity. She is ambulatory without assistive devices with a current METS score of 5.7. PMHX includes OA, obesity, HTN, HLD, T2DM, asthma, DVT/PE, GERD, kidney stones, BRIDGER, and chronic nausea. Presents to [facility] PAT today for perioperative risk stratification and optimization.     Past Medical History:   Diagnosis Date    Asthma     \"Severe persistent asthma\" per pulmonology    Chest pain     Numerous work ups for CP.    Chronic allergic rhinitis     Chronic constipation     Collapse of right lung     Chronic RML collapse with associated bronchiectasis, seen on CT    Dependence on nocturnal oxygen therapy     2-3 L O2 nightly    Diverticulosis     Hiatal hernia with GERD     4cm per EGD 2023    History of venous thromboembolism 2010    6-8 weeks post L Achilles repair, LE DVT and unilateral PE. Treated w/ anticoagulation, then found to have possible PE in other lung    Hyperlipidemia     Hypertension     Migraine     Morbid obesity with BMI of 45.0-49.9, adult (Multi)     BRIDGER (obstructive sleep apnea)     partially CPAP compliant w/ 2-3L O2 bleed in    Paroxysmal atrial fibrillation (Multi)     Right renal stone     6 mm stone midpole caliceal system right kidney without obstruction, last seen 2023     Past Surgical History:   Procedure Laterality Date    ACHILLES TENDON SURGERY Left 2010    BI BREAST RIGHT CORE NEEDLE BIOPSY       SECTION, CLASSIC      COLONOSCOPY      CT ANGIO CORONARY ART WITH HEARTFLOW IF SCORE >30%  2022    CT HEART " CORONARY ANGIOGRAM 11/18/2022 [facility]CTOR OFF[facility]    ESOPHAGOSCOPY / EGD  08/30/2023    KNEE ARTHROSCOPY W/ DEBRIDEMENT Right     MR HEAD ANGIO WO IV CONTRAST  04/05/2019    [place] HEAD ANG[place] [facility] [place]    MR NECK ANGIO WO IV CONTRAST  04/05/2019    MR NECK ANGIO WO IV CONTRAST [place] [facility] [place]    PARTIAL HYSTERECTOMY  11/30/2006    fibroids/bleeding    SHOULDER ARTHROSCOPY W/ ROTATOR CUFF REPAIR Left 12/30/2020    TOE SURGERY Right 02/17/2023    Digital arthroplasties with pin fixation, toes 2, 3, right foot.     Patient  has no history on file for sexual activity.    Family History   Problem Relation Name Age of Onset    Thyroid cancer Mother      Kidney cancer Father      Diabetes Other      Hypertension Other      Stroke Other       Allergies   Allergen Reactions    Penicillins Other, Shortness of breath and Unknown     asthma    Sulfa (Sulfonamide Antibiotics) Other     Asthma      Prior to Admission medications    Medication Sig Start Date End Date Taking? Authorizing Provider   apixaban (Eliquis) 5 mg tablet Take 1 tablet (5 mg) by mouth twice a day. 4/21/23  Yes Historical Provider, MD   atorvastatin (Lipitor) 40 mg tablet Take 1 tablet (40 mg) by mouth once daily. 9/19/20  Yes Historical Provider, MD   benralizumab (Fasenra) 30 mg/mL injection Inject 1 Syringe (30 mg) under the skin every 28 (twenty-eight) days. 8/9/24  Yes Stuart Hodge,    cyanocobalamin, vitamin B-12, (VITAMIN B-12 ORAL) Take by mouth once daily.   Yes Historical Provider, MD   ergocalciferol (Vitamin D-2) 1.25 MG (18681 UT) capsule Take 1 capsule (1,250 mcg) by mouth every 7 days. 11/8/24  Yes Historical Provider, MD   flecainide (Tambocor) 100 mg tablet Take 1 tablet (100 mg) by mouth 2 times a day. 10/14/15  Yes Historical Provider, MD   folic acid (Folvite) 1 mg tablet Take by mouth once daily.   Yes Historical Provider, MD   linaCLOtide (Linzess) 145 mcg capsule Take 1 tablet by mouth  once daily as needed. 8/18/15  Yes Historical Provider, MD   lisinopril 40 mg tablet Take 1 tablet (40 mg) by mouth once daily.   Yes Historical Provider, MD   metFORMIN (Glucophage) 500 mg tablet TAKE 1 TABLET BY MOUTH TWICE A DAY WITH A MEAL FOR 30 DAYS 3/30/24  Yes Historical Provider, MD   metoprolol tartrate (Lopressor) 100 mg tablet Take 1 tablet (100 mg) by mouth 2 times a day.   Yes Historical Provider, MD   montelukast (Singulair) 10 mg tablet TAKE 1 TABLET (10 MG) BY MOUTH ONCE EVERY 24 HOURS. 11/8/24  Yes Historical Provider, MD   omeprazole (PriLOSEC) 40 mg DR capsule 1 capsule (40 mg) once every 24 hours.   Yes Historical Provider, MD   tirzepatide 7.5 mg/0.5 mL pen injector Inject 7.5 mg under the skin every 7 days.   Yes Historical Provider, MD   torsemide (Demadex) 20 mg tablet Take 1 tablet (20 mg) by mouth once daily. 11/29/23  Yes Mary Severino PA-C   albuterol 90 mcg/actuation inhaler every 4 hours if needed. 1/19/22   Historical Provider, MD   Alcohol Prep Pads pads, medicated USE TO TEST BLOOD SUGAR ONCE DAILY 11/19/24   Historical Provider, MD   benralizumab (Fasenra) 30 mg/mL injection Inject 1 Syringe (30 mg) under the skin every 8 (eight) weeks. 11/15/24   Stuart Hodge, DO   benralizumab (Fasenra) 30 mg/mL injection Inject 1 mL (30 mg) under the skin every 8 (eight) weeks. 11/15/24   Stuart Hodge, DO   budesonide-formoteroL (Symbicort) 80-4.5 mcg/actuation inhaler Inhale 2 puffs 2 times a day as needed (shortness of breath). Rinse mouth with water after use to reduce aftertaste and incidence of candidiasis. Do not swallow.  Patient not taking: Reported on 2/3/2025 11/15/24 5/14/25  Stuart Hodeg DO   diclofenac sodium (Voltaren) 1 % gel gel Apply 1 Application topically 4 times a day as needed (pain).  Patient not taking: Reported on 2/3/2025 10/8/23   Blanca Hernandez MD   diphenhydrAMINE (BENADryl) 25 mg capsule Take 1-2 capsules (25-50 mg) by mouth every 4 hours if needed.  12/2/24 12/16/24  Historical Provider, MD   fluticasone (Flonase) 50 mcg/actuation nasal spray Administer 2 sprays into each nostril once daily. Shake gently. Before first use, prime pump. After use, clean tip and replace cap.  Patient not taking: Reported on 2/3/2025 11/15/24 5/14/25  Stuart Hodge DO   loratadine (Claritin) 10 mg tablet Take 1 tablet (10 mg) by mouth once daily.  Patient not taking: Reported on 2/3/2025 2/29/20   Historical Provider, MD   meclizine (Antivert) 25 mg tablet Take 1 tablet (25 mg) by mouth every 12 hours if needed.    Historical Provider, MD   ondansetron (Zofran) 4 mg tablet Take 1 tablet (4 mg) by mouth every 6 hours if needed for nausea or vomiting.  Patient not taking: Reported on 2/3/2025 1/4/21   Historical Provider, MD   OneTouch Delica Plus Lancet 33 gauge misc USE 1 LANCET TO TEST BLOOD SUGAR THREE TIMES DAILY 12/2/24   Historical Provider, MD   OneTouch Verio Flex meter misc TEST BLOOD SUGAR ONCE DAILY 11/19/24   Historical Provider, MD   OneTouch Verio test strips strip USE TO TEST BLOOD SUGAR ONCE DAILY 11/19/24   Historical Provider, MD   metoclopramide (Reglan) 5 mg tablet  12/3/24 2/3/25  Historical Provider, MD       Review of Systems   Constitutional: Negative for chills, decreased appetite, diaphoresis, fever and malaise/fatigue.   Eyes:  Negative for blurred vision and double vision.   Cardiovascular:  Negative for chest pain, claudication, cyanosis, dyspnea on exertion, irregular heartbeat, leg swelling, near-syncope and palpitations.   Respiratory:  Negative for cough, hemoptysis, shortness of breath and wheezing.    Endocrine: Negative for cold intolerance, heat intolerance, polydipsia, polyphagia and polyuria.   Gastrointestinal:  Negative for abdominal pain, constipation, diarrhea, dysphagia, nausea and vomiting.   Genitourinary:  Negative for bladder incontinence, dysuria, hematuria, incomplete emptying, nocturia, frequency, pelvic pain and urgency.  "  Neurological:  Negative for headaches, light-headedness, paresthesias, sensory change and weakness.   Psychiatric/Behavioral:  Negative for altered mental status.    Musculoskeletal: Negative for myalgias. Positive for arthralgias.     Vitals and nursing note reviewed.     Physical exam  Constitutional:       Appearance: Normal appearance. She is Obese.   HENT:      Head: Normocephalic and atraumatic.      Mouth/Throat:      Mouth: Mucous membranes are moist.      Pharynx: Oropharynx is clear.   Eyes:      Extraocular Movements: Extraocular movements intact.      Conjunctiva/sclera: Conjunctivae normal.      Pupils: Pupils are equal, round, and reactive to light.   Cardiovascular:      PMI at left midclavicular line. Normal rate. Regular rhythm. Normal S1. Normal S2.       Murmurs: There is no murmur.      No gallop.  No click. No rub.       No audible carotid bruit     No lower extremity edema on exam  Pulmonary:      Effort: Pulmonary effort is normal.      Breath sounds: Normal breath sounds.   Abdominal:      General: Abdomen is flat. Bowel sounds are normal.      Palpations: Abdomen is soft and non-tender  Musculoskeletal:      Cervical back: Normal range of motion and neck supple.      Knee, left: Limited ROM  Skin:     General: Skin is warm and dry.      Capillary Refill: Capillary refill takes less than 2 seconds.   Neurological:      General: No focal deficit present.      Mental Status: She is alert and oriented to person, place, and time. Mental status is at baseline.   Psychiatric:         Mood and Affect: Mood normal.         Behavior: Behavior normal.         Thought Content: Thought content normal.         Judgment: Judgment normal.     Vitals and nursing note reviewed. Physical exam within normal limits.     Visit Vitals  BP (!) 150/96   Pulse 75   Temp 36.7 °C (98.1 °F) (Temporal)   Resp 18   Ht 1.676 m (5' 6\")   Wt 103 kg (227 lb 4.7 oz)   SpO2 99%   BMI 36.69 kg/m²   OB Status Postmenopausal "   Smoking Status Never   BSA 2.19 m²     DASI Risk Score      Flowsheet Row Pre-Admission Testing from 2/3/2025 in [facility]   Can you take care of yourself (eat, dress, bathe, or use toilet)?  2.75 filed at 02/03/2025 1409   Can you walk indoors, such as around your house? 1.75 filed at 02/03/2025 1409   Can you walk a block or two on level ground?  2.75 filed at 02/03/2025 1409   Can you climb a flight of stairs or walk up a hill? 5.5 filed at 02/03/2025 1409   Can you run a short distance? 0 filed at 02/03/2025 1409   Can you do light work around the house like dusting or washing dishes? 2.7 filed at 02/03/2025 1409   Can you do moderate work around the house like vacuuming, sweeping floors or carrying groceries? 3.5 filed at 02/03/2025 1409   Can you do heavy work around the house like scrubbing floors or lifting and moving heavy furniture?  0 filed at 02/03/2025 1409   Can you do yard work like raking leaves, weeding or pushing a mower? 0 filed at 02/03/2025 1409   Can you have sexual relations? 5.25 filed at 02/03/2025 1409   Can you participate in moderate recreational activities like golf, bowling, dancing, doubles tennis or throwing a baseball or football? 0 filed at 02/03/2025 1409   Can you participate in strenous sports like swimming, singles tennis, football, basketball, or skiing? 0 filed at 02/03/2025 1409   DASI SCORE 24.2 filed at 02/03/2025 1409   METS Score (Will be calculated only when all the questions are answered) 5.7 filed at 02/03/2025 1409          Caprini DVT Assessment      Flowsheet Row Pre-Admission Testing from 2/3/2025 in [facility] ED to Hosp-Admission (Discharged) from 11/28/2023 in [facility] 2 with Fern Lima MD   DVT Score (IF A SCORE IS NOT CALCULATING, MUST SELECT A BMI TO COMPLETE) 12 filed at 02/03/2025 1408 7 filed at 11/28/2023 6989   Medical Factors History of DVT/PE filed at 02/03/2025 1408 --   Surgical Factors  Elective major lower extremity arthroplasty filed at 02/03/2025 1408 --   BMI (BMI MUST BE CHOSEN) 31-40 (Obesity) filed at 02/03/2025 1408 41-50 (Morbid obesity) filed at 11/28/2023 1339   RETIRED: History -- SVT, DVT/PE filed at 11/28/2023 1339   RETIRED: Age -- 40-59 years filed at 11/28/2023 1339          Modified Frailty Index      Flowsheet Row Pre-Admission Testing from 2/3/2025 in [facility]   Non-independent functional status (problems with dressing, bathing, personal grooming, or cooking) 0 filed at 02/03/2025 1409   History of diabetes mellitus  0.0909 filed at 02/03/2025 1409   History of COPD 0 filed at 02/03/2025 1409   History of CHF No filed at 02/03/2025 1409   History of MI 0 filed at 02/03/2025 1409   History of Percutaneous Coronary Intervention, Cardiac Surgery, or Angina No filed at 02/03/2025 1409   Hypertension requiring the use of medication  0.0909 filed at 02/03/2025 1409   Peripheral vascular disease 0 filed at 02/03/2025 1409   Impaired sensorium (cognitive impairement or loss, clouding, or delirium) 0 filed at 02/03/2025 1409   TIA or CVA withouy residual deficit 0.0909 filed at 02/03/2025 1409   Cerebrovascular accident with deficit 0 filed at 02/03/2025 1409   Modified Frailty Index Calculator .2727 filed at 02/03/2025 1409          CHADS2 Stroke Risk  Current as of about an hour ago        12.5% 3 to 100%: High Risk   2 to < 3%: Medium Risk   0 to < 2%: Low Risk     No Change          This score determines the patient's risk of having a stroke if the patient has atrial fibrillation.          Points Metrics   1 Has Congestive Heart Failure:  Yes      Patients with congestive heart failure get 1 point.    Current as of about an hour ago   1 Has Hypertension:  Yes     Patients with hypertension get 1 point.    Current as of about an hour ago   0 Age:  55     Patients who are 75 years of age or older get 1 point.    Current as of about an hour ago   1 Has Diabetes  Excluding Gestational Diabetes:  Yes     Patients with diabetes get 1 point.    Current as of about an hour ago   2 Had Stroke:  No  Had TIA:  Yes  Had Thromboembolism:  No     Patients who have had a stroke, TIA, or thromboembolism get 2 points.    Current as of about an hour ago             Revised Cardiac Risk Index      Flowsheet Row Pre-Admission Testing from 2/3/2025 in [facility]   High-Risk Surgery (Intraperitoneal, Intrathoracic,Suprainguinal vascular) 0 filed at 02/03/2025 1409   History of ischemic heart disease (History of MI, History of positive exercuse test, Current chest paint considered due to myocardial ischemia, Use of nitrate therapy, ECG with pathological Q Waves) 0 filed at 02/03/2025 1409   History of congestive heart failure (pulmonary edemia, bilateral rales or S3 gallop, Paroxysmal nocturnal dyspnea, CXR showing pulmonary vascular redistribution) 0 filed at 02/03/2025 1409   History of cerebrovascular disease (Prior TIA or stroke) 1 filed at 02/03/2025 1409   Pre-operative insulin treatment 0 filed at 02/03/2025 1409   Pre-operative creatinine>2 mg/dl 0 filed at 02/03/2025 1409   Revised Cardiac Risk Calculator 1 filed at 02/03/2025 1409          Apfel Simplified Score    No data to display       Risk Analysis Index Results This Encounter    No data found in the last 10 encounters.       Stop Bang Score      Flowsheet Row Pre-Admission Testing from 2/3/2025 in [facility]   Do you snore loudly? 0 filed at 02/03/2025 1325   Do you often feel tired or fatigued after your sleep? 1 filed at 02/03/2025 1325   Has anyone ever observed you stop breathing in your sleep? 1 filed at 02/03/2025 1325   Do you have or are you being treated for high blood pressure? 1 filed at 02/03/2025 1325   Recent BMI (Calculated) 36.7 filed at 02/03/2025 1325   Is BMI greater than 35 kg/m2? 1=Yes filed at 02/03/2025 1325   Age older than 50 years old? 1=Yes filed at 02/03/2025 1325    Is your neck circumference greater than 17 inches (Male) or 16 inches (Female)? 1 filed at 02/03/2025 1325   Gender - Male 0=No filed at 02/03/2025 1325   STOP-BANG Total Score 6 filed at 02/03/2025 1325          Prodigy: High Risk  Total Score: 0          ARISCAT Score for Postoperative Pulmonary Complications      Flowsheet Row Pre-Admission Testing from 2/3/2025 in [facility]   Age Calculated Score 3 filed at 02/03/2025 1409   Preoperative SpO2 0 filed at 02/03/2025 1409   Respiratory infection in the last month Either upper or lower (i.e., URI, bronchitis, pneumonia), with fever and antibiotic treatment 0 filed at 02/03/2025 1409   Preoperative anemia (Hgb less than 10 g/dl) 0 filed at 02/03/2025 1409   Surgical incision  0 filed at 02/03/2025 1409   Duration of surgery  16 filed at 02/03/2025 1409   Emergency Procedure  0 filed at 02/03/2025 1409   ARISCAT Total Score  19 filed at 02/03/2025 1409          Bianchi Perioperative Risk for Myocardial Infarction or Cardiac Arrest (EMMA)      Flowsheet Row Pre-Admission Testing from 2/3/2025 in [facility]   Calculated Age Score 1.06 filed at 02/03/2025 1409   Functional Status  0 filed at 02/03/2025 1409   ASA Class  -1.92 filed at 02/03/2025 1409   Creatinine 0 filed at 02/03/2025 1409   Type of Procedure  0.80 filed at 02/03/2025 1409   EMMA Total Score  -5.31 filed at 02/03/2025 1409   EMMA % 0.49 filed at 02/03/2025 1409          Assessment & Plan:    Neuro:  No significant findings on chart review or clinical presentation and evaluation.   History of Transient ischemic attack-Occurred 2020. States she presented with unilateral weakness. Lasted 1-2 days and resolved. No neuro deficits in PAT    HEENT/Airway:  No significant findings on chart review or clinical presentation and evaluation.   History of Obstructive sleep apnea-Managed with CPAP-compliant  STOP-BANG Score-6 points high risk for BRIDGER    Mallampati::  II    TM  distance::  >3 FB    Neck ROM::  Full  Dentures-denies  Crowns-reports x 12  Implants-reports x 1    Cardiovascular:  No significant findings on chart review or clinical presentation and evaluation.   History of Atrial fibrillation-Managed with Eliquis, Metoprolol, and Flecainide. Last EKG SR. HS RRR  History of Hypertension-Managed with Lisinopril, Torsemide, and Metoprolol. BP in PAT was 150/96  History of Hyperlipidemia-Managed with Atorvastatin  History of DVT/PE-2010, cause determined to be from achilles surgery. Managed with Eliquis. No reoccurrence.   METS: 5.7  RCRI: 1 point, 6.0% risk for postoperative MACE   EMMA: 0.49% risk for postoperative MACE  EKG -normal sinus rhythm, left axis deviation, and septal infarct Rate-74 No acute changes.     Pulmonary:  No significant findings on chart review or clinical presentation and evaluation.   History of Asthma-Managed with Albuterol, Montelukast, and Symbicort. LSCTAB with a resting SPO2 of 99% RA.  ARISCAT: <26 points, 1.6% risk of in-hospital postoperative pulmonary complication  PRODIGY: High risk for opioid induced respiratory depression  Smoking History-She has never smoked.  Discussed smoking cessation and deep breathing handout given    Renal/Urinary:  No diagnosis or significant findings on chart review or clinical presentation and evaluation, however, the patient is at increased risk of perioperative renal complications secondary to HTN and diabetes. Preventative measures include BP monitoring, medication compliance, and hydration management.   CMP-Reviewed, stable  Creatinine-0.80  GFR-88    Endocrine:  No significant findings on chart review or clinical presentation and evaluation.   History of B8SI-Fffggge with Metformin and Mounjaro  [id number]-2.7%    Hematologic/Immunology:  No diagnosis or significant findings on chart review or clinical presentation and evaluation.  The patient is not a Jehovah’s witness and will accept blood and blood products if  medically indicated.   History of previous blood transfusions No  CBC-Reviewed, stable  Positive for Anemia-HGB 11.4. Normocytic, normochromic. Baseline  Caprini Score 12, patient at High for postoperative DVT. Pt supplied education/VTE handout  Anticoagulation use: Yes   Eliquis(DVT/PE, AF)-OK to DC 72 hours preoperatively per Dr Lorenzo. Patient aware and has no questions at this time. See clearance document under encounters.     Gastrointestinal:   No significant findings on chart review or clinical presentation and evaluation.   History of GERD w/Hiatal hernia-Managed with Omeprazole  Recreational drug use: alcohol  Alcohol use social drinker    Infectious disease:   No diagnosis or significant findings on chart review or clinical presentation and evaluation.   Prescription provided for CHG body wash and dental rinse. CHG use instructions reviewed and provided to patient.  Staph screen collected-Negative    Musculoskeletal:   No significant findings on chart review or clinical presentation and evaluation.   Positive for Obesity-BMI 36.69  JHFRAT score-5 points. low risk for falls    Anesthesia:  ASA 3 - Patient with moderate systemic disease with functional limitations  Anticipated anesthesia-Consult  History of General anesthesia- yes  Complications- Post op SOB  No family history of anesthesia complications    Abnormalities noted on PAT evaluation: No    Labs & Imaging ordered:  CBC, CMP, HBA1C, MRSA, EKG  Nickel/metal allergy-negative  Shellfish allergy-negative    Overall, patient Moderate Risk for the scheduled Moderate Risk surgery. Discussed with patient medication instructions, NPO guidelines, and any questions or concerns.     Face to face time spent 45 minutes

## 2025-02-03 NOTE — GROUP NOTE
In addition to the group class activities, Betzaida Jc had the following lab work completed:  No orders of the defined types were placed in this encounter.      A new History and Physical was not completed.    This class lasted approximately 2 hours and had 6 participants. The nurse instructor covered the following topics:    MyChart Enrollment  Communication with Care Team  My Chart is the best form of communication to reach all of your caregivers  You can send messages to specific care givers, or a care team  Continued Education  You will be enrolled in a Joint Replacement care plan to receive additional education before and after surgery  You can review a short recording of the class content - [email or web address]  Access to Medical Records  You can access test results, office notes, appointments, etc.  You can connect to other healthcare systems who use [employer] ([facility], [facility], [facility], etc.)  Enlighted  Program Information  Consent to Enroll    Background/Understanding of Joint Replacement Surgery  Potential for same day discharge  Any questions or concerns to be directed to the surgeon's office  Not all patients are appropriate for same day discharge  All patients will be required to meet discharge criteria prior to leaving our care    How to Prepare for Surgery  Use of Recreational Products (Nicotine, Alcohol, THC, CBD, Drugs, Etc.)  The ultimate goal is to quit using thee products!  Stop several weeks before surgery  Such products slow down the healing process and increase risk of post-op infection and complications  Clearance for Surgery  Preadmission Testing - Appropriateness for anesthesia  Medical Clearance by Specialists  Dental Clearance  Cracked/Broken/Loose teeth left untreated may postpone surgery  The importance of post-op antibiotics for dental visits per surgeon protocol  Preadmission Testing  **Potential for postponed surgery if appropriate clearance is not  obtained  Medication Instruction  Follow instructions provided by the doctor who prescribes your medication (typically, but not limited to cardiologist)  Preadmission testing will provide additional instructions during your appointment on what to stop and what to take as you get closer to surgery  For clarification of these instructions, please call preadmission testing directly - [phone]  Tips for Preparing the home for discharge from the hospital  Care Partner  Requirement for surgery, the patient must have a plan to have help at home  Potential for postponed surgery if plan for home support cannot be established  Your Care Partner does not need medical training  How the care partner can help after surgery  CHG Body Wash/Mouth Wash  Follow the instructions given at preadmission testing  Body wash is to be used on the body and hair for 5 washes  Mouthwash is to be used the night before and morning of surgery  **This is a system-wide protocol developed by infectious disease professionals, we will not alter our recommendations for those with sensitive skin or those who have special hair needs.  Please follow the instructions as they are written as this will provide the best infection prevention measures for surgery.  Should you have an allergy to one of the products, please discuss with your preadmission team**    What to Expect in the Hospital/At Home  Morning of Surgery NPO Guidelines  Nothing to eat after midnight  Water can be consumed up to 2 hours prior to arrival  Surgical and Post-Surgical Care Team  Surgical Team  Anesthesia Team  Nursing  Physical Therapy  Care Coordinating  Pharmacy  Hospital Arrival Instructions  Arrive at the time provided to you  Consider traffic patterns (rush-hour) based on arrival time  Have arrangements made for a ride home  If discharging same day, care partner should remain at the hospital  Recovering after Surgery  Recovery Room - Visitors are not brought back  Transition to  hospital room - 2nd Floor, Visitors will be directed to your room  The presence of and strategies for controlling surgical pain and swelling  The importance of early mobility  Side effects after surgery  What to expect if staying overnight    Discharge Planning  The intended plan for discharge will be for patients to discharge home  All patients require a care partner (family, friend, neighbor, etc.) to stay with the patient for the first few nights after surgery  The inability to secure help at home may postpone surgery  Home Care Services set up per surgeon order  Physical Therapy  Occupational Therapy  **If desired, private duty care can be arranged by the patient ahead of time**  Outpatient Physical Therapy per surgeon order    Recovering at Home  Wound Care  Follow wound care instructions found in your discharge paperwork  Bandage is water-resistant and you may shower with the bandage  Do not scrub directly over the bandage  Do not submerge in water until cleared (bathtub, hot tub, pool, etc.)    Post-Op Risk Prevention  Infection Prevention  Promptly seek treatment for any infections post-operatively  Routine dental visits must be postponed for 3 months after surgery  Your surgeon may require antibiotics prior to future dental visits  Any concerns for infection not related directly to the knee or the hip should be managed by your primary care provider  Blood Clots  Be sure to complete the course of blood thinning medication as prescribed by your surgeon  Movement every 1-2 hours during the day is encouraged to prevent blood clots  Monitor for signs of blood clots  Wear compression stockings as prescribed by your surgeon  Constipation  Constipation is common following surgery  Drink plenty of fluids  Take stool softener/laxative as prescribed by your surgeon  Move around frequently  Eat foods high in fiber  Fall Prevention  Prepare home ahead of time to clear space to move with walker  Remove throw rugs and  electrical cords from walkways  Install railings near any stairways with more than 2 steps  Use night lights for increased visibility at night  Continue to use your assistive device until cleared by surgeon or physical therapy  Dislocation Prevention - Not all procedures will have dislocation precautions  Follow dislocation precautions provided by your surgeon  It is OK to resume sexual activity about 6 weeks following surgery  Be sure to follow any dislocation precautions assigned    Durable Medical Equipment  Cold Therapy  Breg Cold Therapy Machines  Ice/Gel Packs  Assistive Devices  Folding Walker with Wheels (in the front only)  No Rollators  Crutches if approved by Physical Therapy and Surgeon after surgery  Hip Kits  Raised Toilet Seats  Additional Compression Stockings    Joint Preservation  Healthy Activities when Cleared  Walking  Swimming  Bike Riding  Activities to Avoid  Refrain from repetitive motions which have a high impact on the joint  Gradual Progression  Progress activity slowly, listen to your body  Common Findings - NORMAL after surgery  Clicking/Grinding  Numbness near incision    Physical Therapy  Prehabilitation exercises  START TODAY ON BOTH LEGS  Surgery Specific Precautions  Follow surgery specific precautions found in your discharge paperwork    Follow-Up Visit  All patients will see their surgeon for a follow up visit after surgery  The visit may range from 2-6 weeks after surgery and is surgeon specific      Please don't hesitate to reach out if you have any additional questions or concerns.    Lashae Boone MBA, BSN, RN-BC, ONC  SHELBY Paredes, RN  Julisa Hager, UMBERTO  Orthopedic Program Navigators  [facility]   [phone]

## 2025-02-03 NOTE — H&P (VIEW-ONLY)
"CPM/PAT Evaluation       Name: Betzaida Jc (Betzaida Jc)  /Age: 1970/55 y.o.     In-Person       Chief Complaint: Unilateral primary osteoarthritis, left knee     HPI  Patient is an alert and oriented 55 year old female scheduled for a left total knee arthroplasty on 2025 with Dr. Perrin for unilateral primary osteoarthritis, left knee. She endorses left knee pain that she currently rates at a 8/10 which worsens on ambulation and activity. She is ambulatory without assistive devices with a current METS score of 5.7. PMHX includes OA, obesity, HTN, HLD, T2DM, asthma, DVT/PE, GERD, kidney stones, BRIDGER, and chronic nausea. Presents to [facility] PAT today for perioperative risk stratification and optimization.     Past Medical History:   Diagnosis Date    Asthma     \"Severe persistent asthma\" per pulmonology    Chest pain     Numerous work ups for CP.    Chronic allergic rhinitis     Chronic constipation     Collapse of right lung     Chronic RML collapse with associated bronchiectasis, seen on CT    Dependence on nocturnal oxygen therapy     2-3 L O2 nightly    Diverticulosis     Hiatal hernia with GERD     4cm per EGD 2023    History of venous thromboembolism 2010    6-8 weeks post L Achilles repair, LE DVT and unilateral PE. Treated w/ anticoagulation, then found to have possible PE in other lung    Hyperlipidemia     Hypertension     Migraine     Morbid obesity with BMI of 45.0-49.9, adult (Multi)     BRIDGER (obstructive sleep apnea)     partially CPAP compliant w/ 2-3L O2 bleed in    Paroxysmal atrial fibrillation (Multi)     Right renal stone     6 mm stone midpole caliceal system right kidney without obstruction, last seen 2023     Past Surgical History:   Procedure Laterality Date    ACHILLES TENDON SURGERY Left 2010    BI BREAST RIGHT CORE NEEDLE BIOPSY       SECTION, CLASSIC      COLONOSCOPY      CT ANGIO CORONARY ART WITH HEARTFLOW IF SCORE >30%  2022    CT HEART " CORONARY ANGIOGRAM 11/18/2022 DOCTOR OFFICE LEGACY    ESOPHAGOSCOPY / EGD  08/30/2023    KNEE ARTHROSCOPY W/ DEBRIDEMENT Right     MR HEAD ANGIO WO IV CONTRAST  04/05/2019    MR HEAD ANGIO WO IV CONTRAST 4/5/2019 [facility] ANCILLARY LEGACY    MR NECK ANGIO WO IV CONTRAST  04/05/2019    MR NECK ANGIO WO IV CONTRAST 4/5/2019 [facility] ANCILLARY LEGACY    PARTIAL HYSTERECTOMY  11/30/2006    fibroids/bleeding    SHOULDER ARTHROSCOPY W/ ROTATOR CUFF REPAIR Left 12/30/2020    TOE SURGERY Right 02/17/2023    Digital arthroplasties with pin fixation, toes 2, 3, right foot.     Patient  has no history on file for sexual activity.    Family History   Problem Relation Name Age of Onset    Thyroid cancer Mother      Kidney cancer Father      Diabetes Other      Hypertension Other      Stroke Other       Allergies   Allergen Reactions    Penicillins Other, Shortness of breath and Unknown     asthma    Sulfa (Sulfonamide Antibiotics) Other     Asthma      Prior to Admission medications    Medication Sig Start Date End Date Taking? Authorizing Provider   apixaban (Eliquis) 5 mg tablet Take 1 tablet (5 mg) by mouth twice a day. 4/21/23  Yes Historical Provider, MD   atorvastatin (Lipitor) 40 mg tablet Take 1 tablet (40 mg) by mouth once daily. 9/19/20  Yes Historical Provider, MD   benralizumab (Fasenra) 30 mg/mL injection Inject 1 Syringe (30 mg) under the skin every 28 (twenty-eight) days. 8/9/24  Yes Stuart Hodge,    cyanocobalamin, vitamin B-12, (VITAMIN B-12 ORAL) Take by mouth once daily.   Yes Historical Provider, MD   ergocalciferol (Vitamin D-2) 1.25 MG (38847 UT) capsule Take 1 capsule (1,250 mcg) by mouth every 7 days. 11/8/24  Yes Historical Provider, MD   flecainide (Tambocor) 100 mg tablet Take 1 tablet (100 mg) by mouth 2 times a day. 10/14/15  Yes Historical Provider, MD   folic acid (Folvite) 1 mg tablet Take by mouth once daily.   Yes Historical Provider, MD   linaCLOtide (Linzess) 145 mcg capsule Take 1 tablet by mouth  once daily as needed. 8/18/15  Yes Historical Provider, MD   lisinopril 40 mg tablet Take 1 tablet (40 mg) by mouth once daily.   Yes Historical Provider, MD   metFORMIN (Glucophage) 500 mg tablet TAKE 1 TABLET BY MOUTH TWICE A DAY WITH A MEAL FOR 30 DAYS 3/30/24  Yes Historical Provider, MD   metoprolol tartrate (Lopressor) 100 mg tablet Take 1 tablet (100 mg) by mouth 2 times a day.   Yes Historical Provider, MD   montelukast (Singulair) 10 mg tablet TAKE 1 TABLET (10 MG) BY MOUTH ONCE EVERY 24 HOURS. 11/8/24  Yes Historical Provider, MD   omeprazole (PriLOSEC) 40 mg DR capsule 1 capsule (40 mg) once every 24 hours.   Yes Historical Provider, MD   tirzepatide 7.5 mg/0.5 mL pen injector Inject 7.5 mg under the skin every 7 days.   Yes Historical Provider, MD   torsemide (Demadex) 20 mg tablet Take 1 tablet (20 mg) by mouth once daily. 11/29/23  Yes Mary Severino PA-C   albuterol 90 mcg/actuation inhaler every 4 hours if needed. 1/19/22   Historical Provider, MD   Alcohol Prep Pads pads, medicated USE TO TEST BLOOD SUGAR ONCE DAILY 11/19/24   Historical Provider, MD   benralizumab (Fasenra) 30 mg/mL injection Inject 1 Syringe (30 mg) under the skin every 8 (eight) weeks. 11/15/24   Stuart Hodge, DO   benralizumab (Fasenra) 30 mg/mL injection Inject 1 mL (30 mg) under the skin every 8 (eight) weeks. 11/15/24   Stuart Hodge, DO   budesonide-formoteroL (Symbicort) 80-4.5 mcg/actuation inhaler Inhale 2 puffs 2 times a day as needed (shortness of breath). Rinse mouth with water after use to reduce aftertaste and incidence of candidiasis. Do not swallow.  Patient not taking: Reported on 2/3/2025 11/15/24 5/14/25  Stuart Hodge DO   diclofenac sodium (Voltaren) 1 % gel gel Apply 1 Application topically 4 times a day as needed (pain).  Patient not taking: Reported on 2/3/2025 10/8/23   Blanca Hernandez MD   diphenhydrAMINE (BENADryl) 25 mg capsule Take 1-2 capsules (25-50 mg) by mouth every 4 hours if needed.  12/2/24 12/16/24  Historical Provider, MD   fluticasone (Flonase) 50 mcg/actuation nasal spray Administer 2 sprays into each nostril once daily. Shake gently. Before first use, prime pump. After use, clean tip and replace cap.  Patient not taking: Reported on 2/3/2025 11/15/24 5/14/25  Stuart Hodge DO   loratadine (Claritin) 10 mg tablet Take 1 tablet (10 mg) by mouth once daily.  Patient not taking: Reported on 2/3/2025 2/29/20   Historical Provider, MD   meclizine (Antivert) 25 mg tablet Take 1 tablet (25 mg) by mouth every 12 hours if needed.    Historical Provider, MD   ondansetron (Zofran) 4 mg tablet Take 1 tablet (4 mg) by mouth every 6 hours if needed for nausea or vomiting.  Patient not taking: Reported on 2/3/2025 1/4/21   Historical Provider, MD   OneTouch Delica Plus Lancet 33 gauge misc USE 1 LANCET TO TEST BLOOD SUGAR THREE TIMES DAILY 12/2/24   Historical Provider, MD   OneTouch Verio Flex meter misc TEST BLOOD SUGAR ONCE DAILY 11/19/24   Historical Provider, MD   OneTouch Verio test strips strip USE TO TEST BLOOD SUGAR ONCE DAILY 11/19/24   Historical Provider, MD   metoclopramide (Reglan) 5 mg tablet  12/3/24 2/3/25  Historical Provider, MD       Review of Systems   Constitutional: Negative for chills, decreased appetite, diaphoresis, fever and malaise/fatigue.   Eyes:  Negative for blurred vision and double vision.   Cardiovascular:  Negative for chest pain, claudication, cyanosis, dyspnea on exertion, irregular heartbeat, leg swelling, near-syncope and palpitations.   Respiratory:  Negative for cough, hemoptysis, shortness of breath and wheezing.    Endocrine: Negative for cold intolerance, heat intolerance, polydipsia, polyphagia and polyuria.   Gastrointestinal:  Negative for abdominal pain, constipation, diarrhea, dysphagia, nausea and vomiting.   Genitourinary:  Negative for bladder incontinence, dysuria, hematuria, incomplete emptying, nocturia, frequency, pelvic pain and urgency.  "  Neurological:  Negative for headaches, light-headedness, paresthesias, sensory change and weakness.   Psychiatric/Behavioral:  Negative for altered mental status.    Musculoskeletal: Negative for myalgias. Positive for arthralgias.     Vitals and nursing note reviewed.     Physical exam  Constitutional:       Appearance: Normal appearance. She is Obese.   HENT:      Head: Normocephalic and atraumatic.      Mouth/Throat:      Mouth: Mucous membranes are moist.      Pharynx: Oropharynx is clear.   Eyes:      Extraocular Movements: Extraocular movements intact.      Conjunctiva/sclera: Conjunctivae normal.      Pupils: Pupils are equal, round, and reactive to light.   Cardiovascular:      PMI at left midclavicular line. Normal rate. Regular rhythm. Normal S1. Normal S2.       Murmurs: There is no murmur.      No gallop.  No click. No rub.       No audible carotid bruit     No lower extremity edema on exam  Pulmonary:      Effort: Pulmonary effort is normal.      Breath sounds: Normal breath sounds.   Abdominal:      General: Abdomen is flat. Bowel sounds are normal.      Palpations: Abdomen is soft and non-tender  Musculoskeletal:      Cervical back: Normal range of motion and neck supple.      Knee, left: Limited ROM  Skin:     General: Skin is warm and dry.      Capillary Refill: Capillary refill takes less than 2 seconds.   Neurological:      General: No focal deficit present.      Mental Status: She is alert and oriented to person, place, and time. Mental status is at baseline.   Psychiatric:         Mood and Affect: Mood normal.         Behavior: Behavior normal.         Thought Content: Thought content normal.         Judgment: Judgment normal.     Vitals and nursing note reviewed. Physical exam within normal limits.     Visit Vitals  BP (!) 150/96   Pulse 75   Temp 36.7 °C (98.1 °F) (Temporal)   Resp 18   Ht 1.676 m (5' 6\")   Wt 103 kg (227 lb 4.7 oz)   SpO2 99%   BMI 36.69 kg/m²   OB Status Postmenopausal "   Smoking Status Never   BSA 2.19 m²     DASI Risk Score      Flowsheet Row Pre-Admission Testing from 2/3/2025 in [facility]   Can you take care of yourself (eat, dress, bathe, or use toilet)?  2.75 filed at 02/03/2025 1409   Can you walk indoors, such as around your house? 1.75 filed at 02/03/2025 1409   Can you walk a block or two on level ground?  2.75 filed at 02/03/2025 1409   Can you climb a flight of stairs or walk up a hill? 5.5 filed at 02/03/2025 1409   Can you run a short distance? 0 filed at 02/03/2025 1409   Can you do light work around the house like dusting or washing dishes? 2.7 filed at 02/03/2025 1409   Can you do moderate work around the house like vacuuming, sweeping floors or carrying groceries? 3.5 filed at 02/03/2025 1409   Can you do heavy work around the house like scrubbing floors or lifting and moving heavy furniture?  0 filed at 02/03/2025 1409   Can you do yard work like raking leaves, weeding or pushing a mower? 0 filed at 02/03/2025 1409   Can you have sexual relations? 5.25 filed at 02/03/2025 1409   Can you participate in moderate recreational activities like golf, bowling, dancing, doubles tennis or throwing a baseball or football? 0 filed at 02/03/2025 1409   Can you participate in strenous sports like swimming, singles tennis, football, basketball, or skiing? 0 filed at 02/03/2025 1409   DASI SCORE 24.2 filed at 02/03/2025 1409   METS Score (Will be calculated only when all the questions are answered) 5.7 filed at 02/03/2025 1409          Caprini DVT Assessment      Flowsheet Row Pre-Admission Testing from 2/3/2025 in [facility] ED to Hosp-Admission (Discharged) from 11/28/2023 in [facility] 2 with Fern Lima MD   DVT Score (IF A SCORE IS NOT CALCULATING, MUST SELECT A BMI TO COMPLETE) 12 filed at 02/03/2025 1408 7 filed at 11/28/2023 [id number]   Medical Factors History of DVT/PE filed at 02/03/2025 1408 --   Surgical Factors  Elective major lower extremity arthroplasty filed at 02/03/2025 1408 --   BMI (BMI MUST BE CHOSEN) 31-40 (Obesity) filed at 02/03/2025 1408 41-50 (Morbid obesity) filed at 11/28/2023 1339   RETIRED: History -- SVT, DVT/PE filed at 11/28/2023 1339   RETIRED: Age -- 40-59 years filed at 11/28/2023 1339          Modified Frailty Index      Flowsheet Row Pre-Admission Testing from 2/3/2025 in [facility]   Non-independent functional status (problems with dressing, bathing, personal grooming, or cooking) 0 filed at 02/03/2025 1409   History of diabetes mellitus  0.0909 filed at 02/03/2025 1409   History of COPD 0 filed at 02/03/2025 1409   History of CHF No filed at 02/03/2025 1409   History of MI 0 filed at 02/03/2025 1409   History of Percutaneous Coronary Intervention, Cardiac Surgery, or Angina No filed at 02/03/2025 1409   Hypertension requiring the use of medication  0.0909 filed at 02/03/2025 1409   Peripheral vascular disease 0 filed at 02/03/2025 1409   Impaired sensorium (cognitive impairement or loss, clouding, or delirium) 0 filed at 02/03/2025 1409   TIA or CVA withouy residual deficit 0.0909 filed at 02/03/2025 1409   Cerebrovascular accident with deficit 0 filed at 02/03/2025 1409   Modified Frailty Index Calculator .2727 filed at 02/03/2025 1409          CHADS2 Stroke Risk  Current as of about an hour ago        12.5% 3 to 100%: High Risk   2 to < 3%: Medium Risk   0 to < 2%: Low Risk     No Change          This score determines the patient's risk of having a stroke if the patient has atrial fibrillation.          Points Metrics   1 Has Congestive Heart Failure:  Yes      Patients with congestive heart failure get 1 point.    Current as of about an hour ago   1 Has Hypertension:  Yes     Patients with hypertension get 1 point.    Current as of about an hour ago   0 Age:  55     Patients who are 75 years of age or older get 1 point.    Current as of about an hour ago   1 Has Diabetes  Excluding Gestational Diabetes:  Yes     Patients with diabetes get 1 point.    Current as of about an hour ago   2 Had Stroke:  No  Had TIA:  Yes  Had Thromboembolism:  No     Patients who have had a stroke, TIA, or thromboembolism get 2 points.    Current as of about an hour ago             Revised Cardiac Risk Index      Flowsheet Row Pre-Admission Testing from 2/3/2025 in [facility]   High-Risk Surgery (Intraperitoneal, Intrathoracic,Suprainguinal vascular) 0 filed at 02/03/2025 1409   History of ischemic heart disease (History of MI, History of positive exercuse test, Current chest paint considered due to myocardial ischemia, Use of nitrate therapy, ECG with pathological Q Waves) 0 filed at 02/03/2025 1409   History of congestive heart failure (pulmonary edemia, bilateral rales or S3 gallop, Paroxysmal nocturnal dyspnea, CXR showing pulmonary vascular redistribution) 0 filed at 02/03/2025 1409   History of cerebrovascular disease (Prior TIA or stroke) 1 filed at 02/03/2025 1409   Pre-operative insulin treatment 0 filed at 02/03/2025 1409   Pre-operative creatinine>2 mg/dl 0 filed at 02/03/2025 1409   Revised Cardiac Risk Calculator 1 filed at 02/03/2025 1409          Apfel Simplified Score    No data to display       Risk Analysis Index Results This Encounter    No data found in the last 10 encounters.       Stop Bang Score      Flowsheet Row Pre-Admission Testing from 2/3/2025 in [facility]   Do you snore loudly? 0 filed at 02/03/2025 1325   Do you often feel tired or fatigued after your sleep? 1 filed at 02/03/2025 1325   Has anyone ever observed you stop breathing in your sleep? 1 filed at 02/03/2025 1325   Do you have or are you being treated for high blood pressure? 1 filed at 02/03/2025 1325   Recent BMI (Calculated) 36.7 filed at 02/03/2025 1325   Is BMI greater than 35 kg/m2? 1=Yes filed at 02/03/2025 1325   Age older than 50 years old? 1=Yes filed at 02/03/2025 1325    Is your neck circumference greater than 17 inches (Male) or 16 inches (Female)? 1 filed at 02/03/2025 1325   Gender - Male 0=No filed at 02/03/2025 1325   STOP-BANG Total Score 6 filed at 02/03/2025 1325          Prodigy: High Risk  Total Score: 0          ARISCAT Score for Postoperative Pulmonary Complications      Flowsheet Row Pre-Admission Testing from 2/3/2025 in [facility]   Age Calculated Score 3 filed at 02/03/2025 1409   Preoperative SpO2 0 filed at 02/03/2025 1409   Respiratory infection in the last month Either upper or lower (i.e., URI, bronchitis, pneumonia), with fever and antibiotic treatment 0 filed at 02/03/2025 1409   Preoperative anemia (Hgb less than 10 g/dl) 0 filed at 02/03/2025 1409   Surgical incision  0 filed at 02/03/2025 1409   Duration of surgery  16 filed at 02/03/2025 1409   Emergency Procedure  0 filed at 02/03/2025 1409   ARISCAT Total Score  19 filed at 02/03/2025 1409          Bianchi Perioperative Risk for Myocardial Infarction or Cardiac Arrest (EMMA)      Flowsheet Row Pre-Admission Testing from 2/3/2025 in [facility]   Calculated Age Score 1.06 filed at 02/03/2025 1409   Functional Status  0 filed at 02/03/2025 1409   ASA Class  -1.92 filed at 02/03/2025 1409   Creatinine 0 filed at 02/03/2025 1409   Type of Procedure  0.80 filed at 02/03/2025 1409   EMMA Total Score  -5.31 filed at 02/03/2025 1409   EMMA % 0.49 filed at 02/03/2025 1409          Assessment & Plan:    Neuro:  No significant findings on chart review or clinical presentation and evaluation.   History of Transient ischemic attack-Occurred 2020. States she presented with unilateral weakness. Lasted 1-2 days and resolved. No neuro deficits in PAT    HEENT/Airway:  No significant findings on chart review or clinical presentation and evaluation.   History of Obstructive sleep apnea-Managed with CPAP-compliant  STOP-BANG Score-6 points high risk for BRIDGER    Mallampati::  II    TM  distance::  >3 FB    Neck ROM::  Full  Dentures-denies  Crowns-reports x 12  Implants-reports x 1    Cardiovascular:  No significant findings on chart review or clinical presentation and evaluation.   History of Atrial fibrillation-Managed with Eliquis, Metoprolol, and Flecainide. Last EKG SR. HS RRR  History of Hypertension-Managed with Lisinopril, Torsemide, and Metoprolol. BP in PAT was 150/96  History of Hyperlipidemia-Managed with Atorvastatin  History of DVT/PE-2010, cause determined to be from achilles surgery. Managed with Eliquis. No reoccurrence.   METS: 5.7  RCRI: 1 point, 6.0% risk for postoperative MACE   EMMA: 0.49% risk for postoperative MACE  EKG -normal sinus rhythm, left axis deviation, and septal infarct Rate-74 No acute changes.     Pulmonary:  No significant findings on chart review or clinical presentation and evaluation.   History of Asthma-Managed with Albuterol, Montelukast, and Symbicort. LSCTAB with a resting SPO2 of 99% RA.  ARISCAT: <26 points, 1.6% risk of in-hospital postoperative pulmonary complication  PRODIGY: High risk for opioid induced respiratory depression  Smoking History-She has never smoked.  Discussed smoking cessation and deep breathing handout given    Renal/Urinary:  No diagnosis or significant findings on chart review or clinical presentation and evaluation, however, the patient is at increased risk of perioperative renal complications secondary to HTN and diabetes. Preventative measures include BP monitoring, medication compliance, and hydration management.   CMP-Reviewed, stable  Creatinine-0.80  GFR-88    Endocrine:  No significant findings on chart review or clinical presentation and evaluation.   History of [id number] with Metformin and Mounjaro  HOZ0F-3.7%    Hematologic/Immunology:  No diagnosis or significant findings on chart review or clinical presentation and evaluation.  The patient is not a Jehovah’s witness and will accept blood and blood products if  medically indicated.   History of previous blood transfusions No  CBC-Reviewed, stable  Positive for Anemia-HGB 11.4. Normocytic, normochromic. Baseline  Caprini Score 12, patient at High for postoperative DVT. Pt supplied education/VTE handout  Anticoagulation use: Yes   Eliquis(DVT/PE, AF)-OK to DC 72 hours preoperatively per Dr Lorenzo. Patient aware and has no questions at this time. See clearance document under encounters.     Gastrointestinal:   No significant findings on chart review or clinical presentation and evaluation.   History of GERD w/Hiatal hernia-Managed with Omeprazole  Recreational drug use: alcohol  Alcohol use social drinker    Infectious disease:   No diagnosis or significant findings on chart review or clinical presentation and evaluation.   Prescription provided for CHG body wash and dental rinse. CHG use instructions reviewed and provided to patient.  Staph screen collected-Negative    Musculoskeletal:   No significant findings on chart review or clinical presentation and evaluation.   Positive for Obesity-BMI 36.69  JHFRAT score-5 points. low risk for falls    Anesthesia:  ASA 3 - Patient with moderate systemic disease with functional limitations  Anticipated anesthesia-Consult  History of General anesthesia- yes  Complications- Post op SOB  No family history of anesthesia complications    Abnormalities noted on PAT evaluation: No    Labs & Imaging ordered:  CBC, CMP, HBA1C, MRSA, EKG  Nickel/metal allergy-negative  Shellfish allergy-negative    Overall, patient Moderate Risk for the scheduled Moderate Risk surgery. Discussed with patient medication instructions, NPO guidelines, and any questions or concerns.     Face to face time spent 45 minutes

## 2025-02-03 NOTE — PREPROCEDURE INSTRUCTIONS
Medication List            Accurate as of February 3, 2025  1:46 PM. Always use your most recent med list.                albuterol 90 mcg/actuation inhaler  Medication Adjustments for Surgery: Take/Use as prescribed     Alcohol Prep Pads pads, medicated  Generic drug: alcohol swabs  Medication Adjustments for Surgery: Take/Use as prescribed     apixaban 5 mg tablet  Commonly known as: Eliquis  Additional Medication Adjustments for Surgery: Other (Comment)  Notes to patient: Follow prescriber preoperative instructions     atorvastatin 40 mg tablet  Commonly known as: Lipitor  Medication Adjustments for Surgery: Take/Use as prescribed     budesonide-formoteroL 80-4.5 mcg/actuation inhaler  Commonly known as: Symbicort  Inhale 2 puffs 2 times a day as needed (shortness of breath). Rinse mouth with water after use to reduce aftertaste and incidence of candidiasis. Do not swallow.  Medication Adjustments for Surgery: Take/Use as prescribed     * chlorhexidine 4 % external liquid  Commonly known as: Hibiclens  Apply topically once daily for 5 days.  Medication Adjustments for Surgery: Take/Use as prescribed     * chlorhexidine 0.12 % solution  Commonly known as: Peridex  Use 15 mL in the mouth or throat once daily for 2 doses. 15 ML night before surgery and 15 ML morning of surgery. Swish and spit  Medication Adjustments for Surgery: Take/Use as prescribed     diclofenac sodium 1 % gel  Commonly known as: Voltaren  Apply 1 Application topically 4 times a day as needed (pain).  Additional Medication Adjustments for Surgery: Take last dose 7 days before surgery  Notes to patient: Last dose preoperatively 2/11/2025     diphenhydrAMINE 25 mg capsule  Commonly known as: BENADryl  Medication Adjustments for Surgery: Do Not take on the morning of surgery  Notes to patient: Last dose preoperatively 2/18/2025     ergocalciferol 1.25 MG (31713 UT) capsule  Commonly known as: Vitamin D-2  Additional Medication Adjustments for  Surgery: Take last dose 7 days before surgery  Notes to patient: Last dose preoperatively 2/11/2025     * Fasenra 30 mg/mL injection  Generic drug: benralizumab  Inject 1 Syringe (30 mg) under the skin every 28 (twenty-eight) days.  Additional Medication Adjustments for Surgery: Other (Comment)  Notes to patient: Follow prescriber preoperative instructions     * benralizumab 30 mg/mL injection  Commonly known as: Fasenra  Inject 1 Syringe (30 mg) under the skin every 8 (eight) weeks.  Additional Medication Adjustments for Surgery: Other (Comment)  Notes to patient: Follow prescriber preoperative instructions     * benralizumab 30 mg/mL injection  Commonly known as: Fasenra  Inject 1 mL (30 mg) under the skin every 8 (eight) weeks.  Additional Medication Adjustments for Surgery: Other (Comment)  Notes to patient: Follow prescriber preoperative instructions     flecainide 100 mg tablet  Commonly known as: Tambocor  Medication Adjustments for Surgery: Take/Use as prescribed     fluticasone 50 mcg/actuation nasal spray  Commonly known as: Flonase  Administer 2 sprays into each nostril once daily. Shake gently. Before first use, prime pump. After use, clean tip and replace cap.  Medication Adjustments for Surgery: Take/Use as prescribed     folic acid 1 mg tablet  Commonly known as: Folvite  Additional Medication Adjustments for Surgery: Take last dose 7 days before surgery  Notes to patient: Last dose preoperatively 2/11/2025     linaCLOtide 145 mcg capsule  Commonly known as: Linzess  Medication Adjustments for Surgery: Take/Use as prescribed     lisinopril 40 mg tablet  Medication Adjustments for Surgery: Take last dose 1 day (24 hours) before surgery  Notes to patient: Last dose preoperatively 2/18/2025 AM dose only if applicable. If taken in evening last dose should be on 2/17/2025     loratadine 10 mg tablet  Commonly known as: Claritin  Medication Adjustments for Surgery: Take/Use as prescribed     meclizine 25 mg  tablet  Commonly known as: Antivert  Medication Adjustments for Surgery: Take/Use as prescribed     metFORMIN 500 mg tablet  Commonly known as: Glucophage  Medication Adjustments for Surgery: Do Not take on the morning of surgery  Notes to patient: Last dose preoperatively 2/18/2025     metoprolol tartrate 100 mg tablet  Commonly known as: Lopressor  Medication Adjustments for Surgery: Take/Use as prescribed     montelukast 10 mg tablet  Commonly known as: Singulair  Medication Adjustments for Surgery: Take/Use as prescribed     omeprazole 40 mg DR capsule  Commonly known as: PriLOSEC  Medication Adjustments for Surgery: Take/Use as prescribed     OneTouch Delica Plus Lancet 33 gauge misc  Generic drug: lancets  Medication Adjustments for Surgery: Take/Use as prescribed     OneTouch Verio Flex meter misc  Generic drug: blood-glucose meter  Medication Adjustments for Surgery: Take/Use as prescribed     OneTouch Verio test strips strip  Generic drug: blood sugar diagnostic  Medication Adjustments for Surgery: Take/Use as prescribed     tirzepatide 7.5 mg/0.5 mL pen injector  Additional Medication Adjustments for Surgery: Take last dose 7 days before surgery  Notes to patient: Last dose preoperatively 2/11/2025     torsemide 20 mg tablet  Commonly known as: Demadex  Take 1 tablet (20 mg) by mouth once daily.  Medication Adjustments for Surgery: Do Not take on the morning of surgery  Notes to patient: Last dose preoperatively 2/18/2025     VITAMIN B-12 ORAL  Additional Medication Adjustments for Surgery: Take last dose 7 days before surgery  Notes to patient: Last dose preoperatively 2/11/2025     Zofran 4 mg tablet  Generic drug: ondansetron  Medication Adjustments for Surgery: Take/Use as prescribed           * This list has 5 medication(s) that are the same as other medications prescribed for you. Read the directions carefully, and ask your doctor or other care provider to review them with you.                NPO  Instructions:     Do not eat any food or drink liquid after midnight the night before your surgery/procedure.  Additional Instructions:      Seven/Six Days before Surgery:  Review your medication instructions, stop indicated medications  Five Days before Surgery:  Review your medication instructions, stop indicated medications  Begin using your Hibiclens  Three Days before Surgery:  Review your medication instructions, stop indicated medications  The Day before Surgery:  No smoking or alcohol use 24 hours before surgery  Start using 0.12% CHG mouthwash  Review your medication instructions, stop indicated medications  You will be contacted regarding the time of your arrival to facility and surgery time  Do not eat any food after Midnight  Day of Surgery:  Review your medication instructions, take indicated medications  If you have diabetes, please check your fasting blood sugar upon awakening.  If fasting blood sugar is <80 mg/dl, drink 100 ml of apple juice, time limit of 2 hours before  Wear  comfortable loose fitting clothing  Do not use moisturizers, creams, lotions or perfume  All jewelry and valuables should be left at home     CONTACT SURGEON'S OFFICE IF YOU DEVELOP:  * Fever = 100.4 F   * New respiratory symptoms (e.g. cough, shortness of breath, respiratory distress, sore throat)  * Recent loss of taste or smell  *Flu like symptoms such as headache, fatigue or gastrointestinal symptoms  * You develop any open sores, shingles, burning or painful urination   AND/OR:  * You no longer wish to have the surgery.  * Any other personal circumstances change that may lead to the need to cancel or defer this surgery.  *You were admitted to any hospital within one week of your planned procedure.     SMOKING:  *Quitting smoking can make a huge difference to your health and recovery from surgery.    *If you need help with quitting, call 8-800QUIT-NOW.     THE DAY BEFORE SURGERY:  *Do not eat any food after midnight the  night before your surgery.   SURGICAL TIME:  *You will be contacted between 2 p.m. and 3 p.m. the business day before your surgery with your arrival time.  *If you haven't received a call by 3pm, call [phone]  *Scheduled surgery times may change and you will be notified if this occurs-check your personal voicemail for any updates.     ON THE MORNING OF SURGERY:  *Wear comfortable, loose fitting clothing.   *Do not use moisturizers, creams, lotions or perfume.  *All jewelry and valuables should be left at home.  *Prosthetic devices such as contact lenses, hearing aids, dentures, eyelash extensions, hairpins and body piercing must be removed before surgery.     BRING WITH YOU:  *Photo ID and insurance card  *Current list of medications and allergies  *Pacemaker/Defibrillator/Heart stent cards  *CPAP machine and mask  *Slings/splints/crutches  *Copy of your complete Advanced Directive/DHPOA-if applicable  *Neurostimulator implant remote     PARKING AND ARRIVAL:  *Check in at the Main Entrance desk and let them know you are here for surgery.     IF YOU ARE HAVING OUTPATIENT/SAME DAY SURGERY:  *A responsible adult MUST accompany you at the time of discharge and stay with you for 24 hours after your surgery.  *You may NOT drive yourself home after surgery.  *You may use a taxi or ride sharing service ([employer], [employer]) to return home ONLY if you are accompanied by a friend or family member.  *Instructions for resuming your medications will be provided by your surgeon.     Thank you for coming to Pre Admission testing.      If I have prescribed medication please don't forget to  at your pharmacy.      Any questions about today's visit call [phone] and leave a message in the general mailbox.     Patient instructed to ambulate as soon as possible postoperatively to decrease thromboembolic risk.     Regan Haddad, APRN-CNP     Thank you for visiting the [facility].  If you have any changes  to your health condition, please call the surgeons office to alert them and give them details of your symptoms.        Preoperative Fasting Guidelines     Why must I stop eating and drinking near surgery time?  With sedation, food or liquid in your stomach can enter your lungs causing serious complications  Increases nausea and vomiting     When do I need to stop eating and drinking before my surgery?  Do not eat any food after midnight the night before your surgery/procedure.        Additional Instructions:      The Day before Surgery:  -Review your medication instructions, stop indicated medications  -You will be contacted in the evening regarding the time of your arrival to facility and surgery time     Day of Surgery:  -Review your medication instructions, take indicated medications  -Wear comfortable loose fitting clothing  -Do not use moisturizers, creams, lotions or perfume  -All jewelry and valuables should be left at home                   Preoperative Brain Exercises     What are brain exercises?  A brain exercise is any activity that engages your thinking (cognitive) skills.     What types of activities are considered brain exercises?  Jigsaw puzzles, crossword puzzles, word jumble, memory games, word search, and many more.  Many can be found free online or on your phone via a mobile heather.     Why should I do brain exercises before my surgery?  More recent research has shown brain exercise before surgery can lower the risk of postoperative delirium (confusion) which can be especially important for older adults.  Patients who did brain exercises for 5 to 10 minutes/day the days before surgery, cut their risk of postoperative delirium in half up to 1 week after surgery.                         [facility]     Preoperative Deep Breathing Exercises     Why it is important to do deep breathing exercises before my surgery?  Deep breathing exercises strengthen your breathing muscles.   This helps you to recover after your surgery and decreases the chance of breathing complications.        How are the deep breathing exercises done?  Sit straight with your back supported.  Breathe in deeply and slowly through your nose. Your lower rib cage should expand and your abdomen may move forward.  Hold that breath for 3 to 5 seconds.  Breathe out through pursed lips, slowly and completely.  Rest and repeat 10 times every hour while awake.  Rest longer if you become dizzy or lightheaded.                      [facility]     Preoperative Deep Breathing Exercises     Why it is important to do deep breathing exercises before my surgery?  Deep breathing exercises strengthen your breathing muscles.  This helps you to recover after your surgery and decreases the chance of breathing complications.        How are the deep breathing exercises done?  Sit straight with your back supported.  Breathe in deeply and slowly through your nose. Your lower rib cage should expand and your abdomen may move forward.  Hold that breath for 3 to 5 seconds.  Breathe out through pursed lips, slowly and completely.  Rest and repeat 10 times every hour while awake.  Rest longer if you become dizzy or lightheaded.        Patient Information: Incentive Spirometer  What is an incentive spirometer?  An incentive spirometer is a device used before and after surgery to “exercise” your lungs.  It helps you to take deeper breaths to expand your lungs.  Below is an example of a basic incentive spirometer.  The device you receive may differ slightly but they all function the same.    Why do I need to use an incentive spirometer?  Using your incentive spirometer prepares your lungs for surgery and helps prevent lung problems after surgery.  How do I use my incentive spirometer?  When you're using your incentive spirometer, make sure to breathe through your mouth. If you breathe through your nose, the incentive spirometer  won't work properly. You can hold your nose if you have trouble.  If you feel dizzy at any time, stop and rest. Try again at a later time.  Follow the steps below:  Set up your incentive spirometer, expand the flexible tubing and connect to the outlet.  Sit upright in a chair or bed. Hold the incentive spirometer at eye level.   Put the mouthpiece in your mouth and close your lips tightly around it. Slowly breathe out (exhale) completely.  Breathe in (inhale) slowly through your mouth as deeply as you can. As you take a breath, you will see the piston rise inside the large column. While the piston rises, the indicator should move upwards. It should stay in between the 2 arrows (see Figure).  Try to get the piston as high as you can, while keeping the indicator between the arrows.   If the indicator doesn't stay between the arrows, you're breathing either too fast or too slow.  When you get it as high as you can, hold your breath for 10 seconds, or as long as possible. While you're holding your breath, the piston will slowly fall to the base of the spirometer.  Once the piston reaches the bottom of the spirometer, breathe out slowly through your mouth. Rest for a few seconds.  Repeat 10 times. Try to get the piston to the same level with each breath.  Repeat every hour while awake  You can carefully clean the outside of the mouthpiece with an alcohol wipe or soap and water.       Patient and Family Education             Ways You Can Help Prevent Blood Clots                    This handout explains some simple things you can do to help prevent blood clots.      Blood clots are blockages that can form in the body's veins. When a blood clot forms in your deep veins, it may be called a deep vein thrombosis, or DVT for short. Blood clots can happen in any part of the body where blood flows, but they are most common in the arms and legs. If a piece of a blood clot breaks free and travels to the lungs, it is called a  pulmonary embolus (PE). A PE can be a very serious problem.         Being in the hospital or having surgery can raise your chances of getting a blood clot because you may not be well enough to move around as much as you normally do.         Ways you can help prevent blood clots in the hospital           Wearing SCDs. SCDs stands for Sequential Compression Devices.   SCDs are special sleeves that wrap around your legs  They attach to a pump that fills them with air to gently squeeze your legs every few minutes.   This helps return the blood in your legs to your heart.   SCDs should only be taken off when walking or bathing.   SCDs may not be comfortable, but they can help save your life.                                            Wearing compression stockings - if your doctor orders them. These special snug fitting stockings gently squeeze your legs to help blood flow.       Walking. Walking helps move the blood in your legs.   If your doctor says it is ok, try walking the halls at least   5 times a day. Ask us to help you get up, so you don't fall.      Taking any blood thinning medicines your doctor orders.        Page 1 of 2            [facility]; 3/23   Ways you can help prevent blood clots at home         Wearing compression stockings - if your doctor orders them. ? Walking - to help move the blood in your legs.       Taking any blood thinning medicines your doctor orders.      Signs of a blood clot or PE        Tell your doctor or nurse know right away if you have of the problems listed below.    If you are at home, seek medical care right away. Call 911 for chest pain or problems breathing.          Signs of a blood clot (DVT) - such as pain,  swelling, redness or warmth in your arm or leg      Signs of a pulmonary embolism (PE) - such as chest pain or feeling short of breath    Patient Information: Pre-Operative Infection Prevention Measures     Why did I have my nose, under my arms, and groin  swabbed?  The purpose of the swab is to identify Staphylococcus aureus inside your nose or on your skin.  The swab was sent to the laboratory for culture.  A positive swab/culture for Staphylococcus aureus is called colonization or carriage.      What is Staphylococcus aureus?  Staphylococcus aureus, also known as “staph”, is a germ found on the skin or in the nose of healthy people.  Sometimes Staphylococcus aureus can get into the body and cause an infection.  This can be minor (such as pimples, boils, or other skin problems).  It might also be serious (such as a blood infection, pneumonia, or a surgical site infection).    What is Staphylococcus aureus colonization or carriage?  Colonization or carriage means that a person has the germ but is not sick from it.  These bacteria can be spread on the hands or when breathing or sneezing.    How is Staphylococcus aureus spread?  It is most often spread by close contact with a person or item that carries it.    What happens if my culture is positive for Staphylococcus aureus?  Your doctor/medical team will use this information to guide any antibiotic treatment which may be necessary.  Regardless of the culture results, we will clean the inside of your nose with a betadine swab just before you have your surgery.      Will I get an infection if I have Staphylococcus aureus in my nose or on my skin?  Anyone can get an infection with Staphylococcus aureus.  However, the best way to reduce your risk of infection is to follow the instructions provided to you for the use of your CHG soap and dental rinse.        Patient Information: Oral/Dental Rinse    What is oral/dental rinse?   It is a mouthwash. It is a way of cleaning the mouth with a germ-killing solution before your surgery.  The solution contains chlorhexidine, commonly known as CHG.   It is used inside the mouth to kill a bacteria known as Staphylococcus aureus.  Let your doctor know if you are allergic to  Chlorhexidine.    Why do I need to use CHG oral/dental rinse?  The CHG oral/dental rinse helps to kill a bacteria in your mouth known as Staphylococcus aureus.     This reduces the risk of infection at the surgical site.      Using your CHG oral/dental rinse  STEPS:  Use your CHG oral/dental rinse after you brush your teeth the night before (at bedtime) and the morning of your surgery.  Follow all directions on your prescription label.    Use the cap on the container to measure 15ml   Swish (gargle if you can) the mouthwash in your mouth for at least 30 seconds, (do not swallow) and spit out  After you use your CHG rinse, do not rinse your mouth with water, drink or eat.  Please refer to the prescription label for the appropriate time to resume oral intake      What side effects might I have using the CHG oral/dental rinse?  CHG rinse will stick to plaque on the teeth.  Brush and floss just before use.  Teeth brushing will help avoid staining of plaque during use.    We have sent a prescription for CHG 0.12% mouthwash to your preferred pharmacy.  If you have not already, Please  your prescription and start using five days before surgery.  Follow the instruction sheet provided to you at your CPM/PAT appointment.  Please contact [facility] if you do not receive your CHG mouthwash prescription.      Patient Information: Home Preoperative Antibacterial Shower      What is a home preoperative antibacterial shower?  This shower is a way of cleaning the skin with a germ-killing solution before surgery.  The solution contains chlorhexidine, commonly known as CHG.  CHG is a skin cleanser with germ-killing ability.  Let your doctor know if you are allergic to chlorhexidine.    Why do I need to take a preoperative antibacterial shower?  Skin is not sterile.  It is best to try to make your skin as free of germs as possible before surgery.  Proper cleansing with a germ-killing soap before surgery can lower the number of  germs on your skin.  This helps to reduce the risk of infection at the surgical site.  Following the instructions listed below will help you prepare your skin for surgery.      How do I use the solution?  Steps:  Begin using your CHG soap 5 days before your scheduled surgery on 2/14/2025.    First, wash and rinse your hair using the CHG soap. Keep CHG soap away from ear canals and eyes.  Rinse completely, do not condition.  Hair extensions should be removed.  Wash your face with your normal soap and rinse.    Apply the CHG solution to a clean wet washcloth.  Turn the water off or move away from the water spray to avoid premature rinsing of the CHG soap as you are applying.   Firmly lather your entire body from the neck down.  Do not use on your face.  Pay special attention to the area(s) where your incision(s) will be located unless they are on your face.  Avoid scrubbing your skin too hard.  The important point is to have the CHG soap sit on your skin for 3 minutes.    When the 3 minutes are up, turn on the water and rinse the CHG solution off your body completely.   DO NOT wash with regular soap after you have used the CHG soap solution  Pat yourself dry with a clean, freshly-laundered towel.  DO NOT apply powders, deodorants, or lotions.  Dress in clean, freshly laundered nightclothes.    Be sure to sleep with clean, freshly laundered sheets.  Be aware that CHG will cause stains on fabrics; if you wash them with bleach after use.  Rinse your washcloth and other linens that have contact with CHG completely.  Use only non-chlorine detergents to launder the items used.   The morning of surgery is the fifth day.  Repeat the above steps and dress in clean comfortable clothing     Whom should I contact if I have any questions regarding the use of CHG soap?  Call the [facility] at [phone] if you have any questions.

## 2025-02-05 LAB — STAPHYLOCOCCUS SPEC CULT: NORMAL

## 2025-02-06 ENCOUNTER — APPOINTMENT (OUTPATIENT)
Dept: ORTHOPEDIC SURGERY | Facility: CLINIC | Age: 55
End: 2025-02-06
Payer: COMMERCIAL

## 2025-02-18 PROBLEM — Z96.652 TOTAL KNEE REPLACEMENT STATUS, LEFT: Status: ACTIVE | Noted: 2025-02-18

## 2025-02-18 RX ORDER — ACETAMINOPHEN 500 MG
1000 TABLET ORAL EVERY 6 HOURS PRN
Qty: 240 TABLET | Refills: 0 | Status: SHIPPED | OUTPATIENT
Start: 2025-02-18 | End: 2025-03-20

## 2025-02-18 RX ORDER — TRAMADOL HYDROCHLORIDE 50 MG/1
50 TABLET ORAL EVERY 6 HOURS PRN
Qty: 28 TABLET | Refills: 0 | Status: SHIPPED | OUTPATIENT
Start: 2025-02-18 | End: 2025-02-26

## 2025-02-18 RX ORDER — DOCUSATE SODIUM 100 MG/1
100 CAPSULE, LIQUID FILLED ORAL 2 TIMES DAILY
Qty: 60 CAPSULE | Refills: 0 | Status: SHIPPED | OUTPATIENT
Start: 2025-02-18 | End: 2025-03-21

## 2025-02-18 RX ORDER — POLYETHYLENE GLYCOL 3350 17 G/17G
17 POWDER, FOR SOLUTION ORAL DAILY
Qty: 510 G | Refills: 0 | Status: SHIPPED | OUTPATIENT
Start: 2025-02-18

## 2025-02-18 RX ORDER — OXYCODONE HYDROCHLORIDE 5 MG/1
5 TABLET ORAL EVERY 6 HOURS PRN
Qty: 28 TABLET | Refills: 0 | Status: SHIPPED | OUTPATIENT
Start: 2025-02-18 | End: 2025-02-26

## 2025-02-18 RX ORDER — MELOXICAM 15 MG/1
15 TABLET ORAL DAILY
Qty: 30 TABLET | Refills: 0 | Status: SHIPPED | OUTPATIENT
Start: 2025-02-18 | End: 2025-02-19 | Stop reason: HOSPADM

## 2025-02-18 RX ORDER — PANTOPRAZOLE SODIUM 40 MG/1
40 TABLET, DELAYED RELEASE ORAL DAILY
Qty: 30 TABLET | Refills: 0 | Status: SHIPPED | OUTPATIENT
Start: 2025-02-18 | End: 2025-03-21

## 2025-02-19 ENCOUNTER — TELEPHONE (OUTPATIENT)
Dept: HOME HEALTH SERVICES | Facility: HOME HEALTH | Age: 55
End: 2025-02-19

## 2025-02-19 ENCOUNTER — ANESTHESIA (OUTPATIENT)
Dept: OPERATING ROOM | Facility: HOSPITAL | Age: 55
End: 2025-02-19
Payer: COMMERCIAL

## 2025-02-19 ENCOUNTER — PHARMACY VISIT (OUTPATIENT)
Dept: PHARMACY | Facility: CLINIC | Age: 55
End: 2025-02-19
Payer: COMMERCIAL

## 2025-02-19 ENCOUNTER — APPOINTMENT (OUTPATIENT)
Dept: RADIOLOGY | Facility: HOSPITAL | Age: 55
End: 2025-02-19
Payer: COMMERCIAL

## 2025-02-19 ENCOUNTER — ANESTHESIA EVENT (OUTPATIENT)
Dept: OPERATING ROOM | Facility: HOSPITAL | Age: 55
End: 2025-02-19
Payer: COMMERCIAL

## 2025-02-19 ENCOUNTER — HOSPITAL ENCOUNTER (OUTPATIENT)
Facility: HOSPITAL | Age: 55
Discharge: HOME HEALTH | End: 2025-02-20
Attending: ORTHOPAEDIC SURGERY | Admitting: ORTHOPAEDIC SURGERY
Payer: COMMERCIAL

## 2025-02-19 ENCOUNTER — HOME HEALTH ADMISSION (OUTPATIENT)
Dept: HOME HEALTH SERVICES | Facility: HOME HEALTH | Age: 55
End: 2025-02-19
Payer: COMMERCIAL

## 2025-02-19 DIAGNOSIS — M17.12 UNILATERAL PRIMARY OSTEOARTHRITIS, LEFT KNEE: ICD-10-CM

## 2025-02-19 DIAGNOSIS — Z96.652 TOTAL KNEE REPLACEMENT STATUS, LEFT: Primary | ICD-10-CM

## 2025-02-19 PROBLEM — E11.9 DIABETES MELLITUS, TYPE 2 (MULTI): Status: ACTIVE | Noted: 2025-02-19

## 2025-02-19 LAB
GLUCOSE BLD MANUAL STRIP-MCNC: 119 MG/DL (ref 74–99)
GLUCOSE BLD MANUAL STRIP-MCNC: 133 MG/DL (ref 74–99)
GLUCOSE BLD MANUAL STRIP-MCNC: 87 MG/DL (ref 74–99)

## 2025-02-19 PROCEDURE — 97161 PT EVAL LOW COMPLEX 20 MIN: CPT | Mod: GP

## 2025-02-19 PROCEDURE — 3700000001 HC GENERAL ANESTHESIA TIME - INITIAL BASE CHARGE: Performed by: ORTHOPAEDIC SURGERY

## 2025-02-19 PROCEDURE — 2500000005 HC RX 250 GENERAL PHARMACY W/O HCPCS

## 2025-02-19 PROCEDURE — 2500000004 HC RX 250 GENERAL PHARMACY W/ HCPCS (ALT 636 FOR OP/ED): Performed by: ANESTHESIOLOGY

## 2025-02-19 PROCEDURE — 2500000001 HC RX 250 WO HCPCS SELF ADMINISTERED DRUGS (ALT 637 FOR MEDICARE OP)

## 2025-02-19 PROCEDURE — 2780000003 HC OR 278 NO HCPCS: Performed by: ORTHOPAEDIC SURGERY

## 2025-02-19 PROCEDURE — A27447 PR TOTAL KNEE ARTHROPLASTY: Performed by: ANESTHESIOLOGY

## 2025-02-19 PROCEDURE — 99222 1ST HOSP IP/OBS MODERATE 55: CPT | Performed by: EMERGENCY MEDICINE

## 2025-02-19 PROCEDURE — 2500000005 HC RX 250 GENERAL PHARMACY W/O HCPCS: Performed by: PHYSICIAN ASSISTANT

## 2025-02-19 PROCEDURE — 73560 X-RAY EXAM OF KNEE 1 OR 2: CPT | Mod: LEFT SIDE | Performed by: RADIOLOGY

## 2025-02-19 PROCEDURE — 97530 THERAPEUTIC ACTIVITIES: CPT | Mod: GP

## 2025-02-19 PROCEDURE — 2500000001 HC RX 250 WO HCPCS SELF ADMINISTERED DRUGS (ALT 637 FOR MEDICARE OP): Performed by: PHYSICIAN ASSISTANT

## 2025-02-19 PROCEDURE — C1776 JOINT DEVICE (IMPLANTABLE): HCPCS | Performed by: ORTHOPAEDIC SURGERY

## 2025-02-19 PROCEDURE — 3600000010 HC OR TIME - EACH INCREMENTAL 1 MINUTE - PROCEDURE LEVEL FIVE: Performed by: ORTHOPAEDIC SURGERY

## 2025-02-19 PROCEDURE — 3700000002 HC GENERAL ANESTHESIA TIME - EACH INCREMENTAL 1 MINUTE: Performed by: ORTHOPAEDIC SURGERY

## 2025-02-19 PROCEDURE — 7100000011 HC EXTENDED STAY RECOVERY HOURLY - NURSING UNIT

## 2025-02-19 PROCEDURE — 97110 THERAPEUTIC EXERCISES: CPT | Mod: GP

## 2025-02-19 PROCEDURE — 7100000001 HC RECOVERY ROOM TIME - INITIAL BASE CHARGE: Performed by: ORTHOPAEDIC SURGERY

## 2025-02-19 PROCEDURE — 2500000005 HC RX 250 GENERAL PHARMACY W/O HCPCS: Performed by: ANESTHESIOLOGY

## 2025-02-19 PROCEDURE — 2500000004 HC RX 250 GENERAL PHARMACY W/ HCPCS (ALT 636 FOR OP/ED)

## 2025-02-19 PROCEDURE — 3600000005 HC OR TIME - INITIAL BASE CHARGE - PROCEDURE LEVEL FIVE: Performed by: ORTHOPAEDIC SURGERY

## 2025-02-19 PROCEDURE — 73560 X-RAY EXAM OF KNEE 1 OR 2: CPT | Mod: LT

## 2025-02-19 PROCEDURE — 82947 ASSAY GLUCOSE BLOOD QUANT: CPT | Mod: 59

## 2025-02-19 PROCEDURE — 2500000004 HC RX 250 GENERAL PHARMACY W/ HCPCS (ALT 636 FOR OP/ED): Performed by: PHYSICIAN ASSISTANT

## 2025-02-19 PROCEDURE — C1713 ANCHOR/SCREW BN/BN,TIS/BN: HCPCS | Performed by: ORTHOPAEDIC SURGERY

## 2025-02-19 PROCEDURE — 2720000007 HC OR 272 NO HCPCS: Performed by: ORTHOPAEDIC SURGERY

## 2025-02-19 PROCEDURE — 2500000004 HC RX 250 GENERAL PHARMACY W/ HCPCS (ALT 636 FOR OP/ED): Performed by: ORTHOPAEDIC SURGERY

## 2025-02-19 PROCEDURE — RXMED WILLOW AMBULATORY MEDICATION CHARGE

## 2025-02-19 PROCEDURE — 7100000002 HC RECOVERY ROOM TIME - EACH INCREMENTAL 1 MINUTE: Performed by: ORTHOPAEDIC SURGERY

## 2025-02-19 PROCEDURE — 27447 TOTAL KNEE ARTHROPLASTY: CPT | Performed by: ORTHOPAEDIC SURGERY

## 2025-02-19 PROCEDURE — 64447 NJX AA&/STRD FEMORAL NRV IMG: CPT | Performed by: ANESTHESIOLOGY

## 2025-02-19 PROCEDURE — 2500000002 HC RX 250 W HCPCS SELF ADMINISTERED DRUGS (ALT 637 FOR MEDICARE OP, ALT 636 FOR OP/ED): Performed by: EMERGENCY MEDICINE

## 2025-02-19 DEVICE — ATTUNE PATELLA MEDIALIZED DOME 38MM CEMENTED AOX
Type: IMPLANTABLE DEVICE | Site: KNEE | Status: FUNCTIONAL
Brand: ATTUNE

## 2025-02-19 DEVICE — SMARTSET GHV GENTAMICIN HIGH VISCOSITY BONE CEMENT 40G
Type: IMPLANTABLE DEVICE | Site: KNEE | Status: FUNCTIONAL
Brand: SMARTSET

## 2025-02-19 DEVICE — ATTUNE KNEE SYSTEM TIBIAL INSERT FIXED BEARING POSTERIOR STABILIZED 6 6MM AOX
Type: IMPLANTABLE DEVICE | Site: KNEE | Status: FUNCTIONAL
Brand: ATTUNE

## 2025-02-19 DEVICE — ATTUNE KNEE SYSTEM FEMORAL POSTERIOR STABILIZED NARROW SIZE 6N LEFT CEMENTED
Type: IMPLANTABLE DEVICE | Site: KNEE | Status: FUNCTIONAL
Brand: ATTUNE

## 2025-02-19 RX ORDER — LIDOCAINE HYDROCHLORIDE 10 MG/ML
INJECTION, SOLUTION EPIDURAL; INFILTRATION; INTRACAUDAL; PERINEURAL AS NEEDED
Status: DISCONTINUED | OUTPATIENT
Start: 2025-02-19 | End: 2025-02-19

## 2025-02-19 RX ORDER — FLECAINIDE ACETATE 50 MG/1
100 TABLET ORAL 2 TIMES DAILY
Status: DISCONTINUED | OUTPATIENT
Start: 2025-02-19 | End: 2025-02-20 | Stop reason: HOSPADM

## 2025-02-19 RX ORDER — OXYCODONE HCL 10 MG/1
10 TABLET, FILM COATED, EXTENDED RELEASE ORAL ONCE
Status: COMPLETED | OUTPATIENT
Start: 2025-02-19 | End: 2025-02-19

## 2025-02-19 RX ORDER — PREGABALIN 75 MG/1
75 CAPSULE ORAL ONCE
Status: COMPLETED | OUTPATIENT
Start: 2025-02-19 | End: 2025-02-19

## 2025-02-19 RX ORDER — HYDROMORPHONE HYDROCHLORIDE 0.2 MG/ML
0.2 INJECTION INTRAMUSCULAR; INTRAVENOUS; SUBCUTANEOUS EVERY 5 MIN PRN
Status: DISCONTINUED | OUTPATIENT
Start: 2025-02-19 | End: 2025-02-19 | Stop reason: HOSPADM

## 2025-02-19 RX ORDER — ONDANSETRON 4 MG/1
4 TABLET, ORALLY DISINTEGRATING ORAL EVERY 6 HOURS PRN
Status: DISCONTINUED | OUTPATIENT
Start: 2025-02-19 | End: 2025-02-20 | Stop reason: HOSPADM

## 2025-02-19 RX ORDER — SIMETHICONE 80 MG
80 TABLET,CHEWABLE ORAL 4 TIMES DAILY PRN
Status: DISCONTINUED | OUTPATIENT
Start: 2025-02-19 | End: 2025-02-20 | Stop reason: HOSPADM

## 2025-02-19 RX ORDER — ATORVASTATIN CALCIUM 40 MG/1
40 TABLET, FILM COATED ORAL DAILY
Status: DISCONTINUED | OUTPATIENT
Start: 2025-02-20 | End: 2025-02-20 | Stop reason: HOSPADM

## 2025-02-19 RX ORDER — OXYCODONE HYDROCHLORIDE 5 MG/1
5 TABLET ORAL EVERY 6 HOURS PRN
Status: DISCONTINUED | OUTPATIENT
Start: 2025-02-19 | End: 2025-02-20 | Stop reason: HOSPADM

## 2025-02-19 RX ORDER — LIDOCAINE HYDROCHLORIDE 10 MG/ML
0.1 INJECTION, SOLUTION EPIDURAL; INFILTRATION; INTRACAUDAL; PERINEURAL ONCE
Status: DISCONTINUED | OUTPATIENT
Start: 2025-02-19 | End: 2025-02-19 | Stop reason: HOSPADM

## 2025-02-19 RX ORDER — SCOPOLAMINE 1 MG/3D
1 PATCH, EXTENDED RELEASE TRANSDERMAL
Status: DISCONTINUED | OUTPATIENT
Start: 2025-02-19 | End: 2025-02-19

## 2025-02-19 RX ORDER — METOCLOPRAMIDE HYDROCHLORIDE 5 MG/ML
10 INJECTION INTRAMUSCULAR; INTRAVENOUS ONCE AS NEEDED
Status: DISCONTINUED | OUTPATIENT
Start: 2025-02-19 | End: 2025-02-19 | Stop reason: HOSPADM

## 2025-02-19 RX ORDER — ROPIVACAINE/EPI/CLONIDINE/KET 2.46-0.005
SYRINGE (ML) INJECTION AS NEEDED
Status: DISCONTINUED | OUTPATIENT
Start: 2025-02-19 | End: 2025-02-19 | Stop reason: HOSPADM

## 2025-02-19 RX ORDER — MECLIZINE HCL 25MG 25 MG/1
25 TABLET, CHEWABLE ORAL EVERY 12 HOURS PRN
Status: DISCONTINUED | OUTPATIENT
Start: 2025-02-19 | End: 2025-02-20 | Stop reason: HOSPADM

## 2025-02-19 RX ORDER — HYDROMORPHONE HYDROCHLORIDE 2 MG/ML
INJECTION, SOLUTION INTRAMUSCULAR; INTRAVENOUS; SUBCUTANEOUS AS NEEDED
Status: DISCONTINUED | OUTPATIENT
Start: 2025-02-19 | End: 2025-02-19

## 2025-02-19 RX ORDER — NALOXONE HYDROCHLORIDE 0.4 MG/ML
0.2 INJECTION, SOLUTION INTRAMUSCULAR; INTRAVENOUS; SUBCUTANEOUS EVERY 5 MIN PRN
Status: DISCONTINUED | OUTPATIENT
Start: 2025-02-19 | End: 2025-02-20 | Stop reason: HOSPADM

## 2025-02-19 RX ORDER — VANCOMYCIN 1.5 G/300ML
1500 INJECTION, SOLUTION INTRAVENOUS ONCE
Status: COMPLETED | OUTPATIENT
Start: 2025-02-19 | End: 2025-02-19

## 2025-02-19 RX ORDER — BISACODYL 5 MG
10 TABLET, DELAYED RELEASE (ENTERIC COATED) ORAL DAILY PRN
Status: DISCONTINUED | OUTPATIENT
Start: 2025-02-19 | End: 2025-02-20 | Stop reason: HOSPADM

## 2025-02-19 RX ORDER — CETIRIZINE HYDROCHLORIDE 10 MG/1
10 TABLET ORAL DAILY
Status: DISCONTINUED | OUTPATIENT
Start: 2025-02-20 | End: 2025-02-20 | Stop reason: HOSPADM

## 2025-02-19 RX ORDER — MONTELUKAST SODIUM 10 MG/1
10 TABLET ORAL DAILY
Status: DISCONTINUED | OUTPATIENT
Start: 2025-02-20 | End: 2025-02-20 | Stop reason: HOSPADM

## 2025-02-19 RX ORDER — SODIUM CHLORIDE, SODIUM LACTATE, POTASSIUM CHLORIDE, CALCIUM CHLORIDE 600; 310; 30; 20 MG/100ML; MG/100ML; MG/100ML; MG/100ML
50 INJECTION, SOLUTION INTRAVENOUS CONTINUOUS
Status: DISCONTINUED | OUTPATIENT
Start: 2025-02-19 | End: 2025-02-20 | Stop reason: HOSPADM

## 2025-02-19 RX ORDER — ONDANSETRON HYDROCHLORIDE 2 MG/ML
4 INJECTION, SOLUTION INTRAVENOUS ONCE AS NEEDED
Status: DISCONTINUED | OUTPATIENT
Start: 2025-02-19 | End: 2025-02-19 | Stop reason: HOSPADM

## 2025-02-19 RX ORDER — BISACODYL 10 MG/1
10 SUPPOSITORY RECTAL DAILY PRN
Status: DISCONTINUED | OUTPATIENT
Start: 2025-02-19 | End: 2025-02-20 | Stop reason: HOSPADM

## 2025-02-19 RX ORDER — OXYCODONE HYDROCHLORIDE 5 MG/1
10 TABLET ORAL EVERY 4 HOURS PRN
Status: DISCONTINUED | OUTPATIENT
Start: 2025-02-19 | End: 2025-02-20 | Stop reason: HOSPADM

## 2025-02-19 RX ORDER — ACETAMINOPHEN 325 MG/1
650 TABLET ORAL EVERY 6 HOURS SCHEDULED
Status: DISCONTINUED | OUTPATIENT
Start: 2025-02-19 | End: 2025-02-20 | Stop reason: HOSPADM

## 2025-02-19 RX ORDER — ERGOCALCIFEROL 1.25 MG/1
1250 CAPSULE ORAL
Status: DISCONTINUED | OUTPATIENT
Start: 2025-02-19 | End: 2025-02-20 | Stop reason: HOSPADM

## 2025-02-19 RX ORDER — SODIUM CHLORIDE, SODIUM LACTATE, POTASSIUM CHLORIDE, CALCIUM CHLORIDE 600; 310; 30; 20 MG/100ML; MG/100ML; MG/100ML; MG/100ML
100 INJECTION, SOLUTION INTRAVENOUS CONTINUOUS
Status: DISCONTINUED | OUTPATIENT
Start: 2025-02-19 | End: 2025-02-19 | Stop reason: HOSPADM

## 2025-02-19 RX ORDER — ONDANSETRON HYDROCHLORIDE 2 MG/ML
INJECTION, SOLUTION INTRAVENOUS AS NEEDED
Status: DISCONTINUED | OUTPATIENT
Start: 2025-02-19 | End: 2025-02-19

## 2025-02-19 RX ORDER — MIDAZOLAM HYDROCHLORIDE 1 MG/ML
INJECTION, SOLUTION INTRAMUSCULAR; INTRAVENOUS AS NEEDED
Status: DISCONTINUED | OUTPATIENT
Start: 2025-02-19 | End: 2025-02-19

## 2025-02-19 RX ORDER — TRANEXAMIC ACID 100 MG/ML
INJECTION, SOLUTION INTRAVENOUS AS NEEDED
Status: DISCONTINUED | OUTPATIENT
Start: 2025-02-19 | End: 2025-02-19

## 2025-02-19 RX ORDER — OXYCODONE HYDROCHLORIDE 5 MG/1
5 TABLET ORAL EVERY 4 HOURS PRN
Status: DISCONTINUED | OUTPATIENT
Start: 2025-02-19 | End: 2025-02-19 | Stop reason: HOSPADM

## 2025-02-19 RX ORDER — METOCLOPRAMIDE HYDROCHLORIDE 5 MG/ML
10 INJECTION INTRAMUSCULAR; INTRAVENOUS EVERY 6 HOURS PRN
Status: DISCONTINUED | OUTPATIENT
Start: 2025-02-19 | End: 2025-02-20 | Stop reason: HOSPADM

## 2025-02-19 RX ORDER — PHENYLEPHRINE HCL IN 0.9% NACL 1 MG/10 ML
SYRINGE (ML) INTRAVENOUS AS NEEDED
Status: DISCONTINUED | OUTPATIENT
Start: 2025-02-19 | End: 2025-02-19

## 2025-02-19 RX ORDER — FENTANYL CITRATE 50 UG/ML
50 INJECTION, SOLUTION INTRAMUSCULAR; INTRAVENOUS ONCE
Status: COMPLETED | OUTPATIENT
Start: 2025-02-19 | End: 2025-02-19

## 2025-02-19 RX ORDER — TORSEMIDE 20 MG/1
20 TABLET ORAL DAILY
Status: DISCONTINUED | OUTPATIENT
Start: 2025-02-20 | End: 2025-02-20 | Stop reason: HOSPADM

## 2025-02-19 RX ORDER — VANCOMYCIN 1 G/200ML
1 INJECTION, SOLUTION INTRAVENOUS EVERY 12 HOURS
Status: DISCONTINUED | OUTPATIENT
Start: 2025-02-19 | End: 2025-02-19

## 2025-02-19 RX ORDER — FOLIC ACID 1 MG/1
1 TABLET ORAL DAILY
Status: DISCONTINUED | OUTPATIENT
Start: 2025-02-20 | End: 2025-02-20 | Stop reason: HOSPADM

## 2025-02-19 RX ORDER — METFORMIN HYDROCHLORIDE 500 MG/1
500 TABLET ORAL
Status: DISCONTINUED | OUTPATIENT
Start: 2025-02-19 | End: 2025-02-20 | Stop reason: HOSPADM

## 2025-02-19 RX ORDER — METOPROLOL TARTRATE 50 MG/1
100 TABLET ORAL 2 TIMES DAILY
Status: DISCONTINUED | OUTPATIENT
Start: 2025-02-19 | End: 2025-02-20 | Stop reason: HOSPADM

## 2025-02-19 RX ORDER — FLUTICASONE FUROATE AND VILANTEROL 100; 25 UG/1; UG/1
1 POWDER RESPIRATORY (INHALATION)
Status: DISCONTINUED | OUTPATIENT
Start: 2025-02-19 | End: 2025-02-20 | Stop reason: HOSPADM

## 2025-02-19 RX ORDER — PANTOPRAZOLE SODIUM 40 MG/1
40 TABLET, DELAYED RELEASE ORAL
Status: DISCONTINUED | OUTPATIENT
Start: 2025-02-20 | End: 2025-02-20 | Stop reason: HOSPADM

## 2025-02-19 RX ORDER — ONDANSETRON HYDROCHLORIDE 2 MG/ML
4 INJECTION, SOLUTION INTRAVENOUS EVERY 8 HOURS PRN
Status: DISCONTINUED | OUTPATIENT
Start: 2025-02-19 | End: 2025-02-20 | Stop reason: HOSPADM

## 2025-02-19 RX ORDER — ALBUTEROL SULFATE 0.83 MG/ML
3 SOLUTION RESPIRATORY (INHALATION) EVERY 4 HOURS PRN
Status: DISCONTINUED | OUTPATIENT
Start: 2025-02-19 | End: 2025-02-20 | Stop reason: HOSPADM

## 2025-02-19 RX ORDER — PROPOFOL 10 MG/ML
INJECTION, EMULSION INTRAVENOUS AS NEEDED
Status: DISCONTINUED | OUTPATIENT
Start: 2025-02-19 | End: 2025-02-19

## 2025-02-19 RX ORDER — TALC
3 POWDER (GRAM) TOPICAL NIGHTLY PRN
Status: DISCONTINUED | OUTPATIENT
Start: 2025-02-19 | End: 2025-02-20 | Stop reason: HOSPADM

## 2025-02-19 RX ORDER — FENTANYL CITRATE 50 UG/ML
50 INJECTION, SOLUTION INTRAMUSCULAR; INTRAVENOUS EVERY 5 MIN PRN
Status: DISCONTINUED | OUTPATIENT
Start: 2025-02-19 | End: 2025-02-19 | Stop reason: HOSPADM

## 2025-02-19 RX ORDER — POLYETHYLENE GLYCOL 3350 17 G/17G
17 POWDER, FOR SOLUTION ORAL DAILY
Status: DISCONTINUED | OUTPATIENT
Start: 2025-02-20 | End: 2025-02-20 | Stop reason: HOSPADM

## 2025-02-19 RX ORDER — ACETAMINOPHEN 325 MG/1
975 TABLET ORAL ONCE
Status: COMPLETED | OUTPATIENT
Start: 2025-02-19 | End: 2025-02-19

## 2025-02-19 RX ORDER — DOCUSATE SODIUM 100 MG/1
100 CAPSULE, LIQUID FILLED ORAL 2 TIMES DAILY
Status: DISCONTINUED | OUTPATIENT
Start: 2025-02-19 | End: 2025-02-20 | Stop reason: HOSPADM

## 2025-02-19 RX ORDER — VANCOMYCIN 1.5 G/300ML
1.5 INJECTION, SOLUTION INTRAVENOUS EVERY 12 HOURS
Status: COMPLETED | OUTPATIENT
Start: 2025-02-19 | End: 2025-02-19

## 2025-02-19 RX ORDER — METOCLOPRAMIDE 10 MG/1
10 TABLET ORAL EVERY 6 HOURS PRN
Status: DISCONTINUED | OUTPATIENT
Start: 2025-02-19 | End: 2025-02-20 | Stop reason: HOSPADM

## 2025-02-19 RX ORDER — VANCOMYCIN 2 G/400ML
2000 INJECTION, SOLUTION INTRAVENOUS ONCE
Status: DISCONTINUED | OUTPATIENT
Start: 2025-02-19 | End: 2025-02-19 | Stop reason: DRUGHIGH

## 2025-02-19 RX ORDER — CYCLOBENZAPRINE HCL 10 MG
5 TABLET ORAL 3 TIMES DAILY PRN
Status: DISCONTINUED | OUTPATIENT
Start: 2025-02-19 | End: 2025-02-20 | Stop reason: HOSPADM

## 2025-02-19 RX ORDER — KETAMINE HYDROCHLORIDE 50 MG/ML
INJECTION, SOLUTION INTRAMUSCULAR; INTRAVENOUS AS NEEDED
Status: DISCONTINUED | OUTPATIENT
Start: 2025-02-19 | End: 2025-02-19

## 2025-02-19 RX ORDER — MIDAZOLAM HYDROCHLORIDE 1 MG/ML
2 INJECTION, SOLUTION INTRAMUSCULAR; INTRAVENOUS ONCE
Status: COMPLETED | OUTPATIENT
Start: 2025-02-19 | End: 2025-02-19

## 2025-02-19 RX ORDER — MELOXICAM 7.5 MG/1
7.5 TABLET ORAL ONCE
Status: COMPLETED | OUTPATIENT
Start: 2025-02-19 | End: 2025-02-19

## 2025-02-19 RX ORDER — LISINOPRIL 20 MG/1
40 TABLET ORAL DAILY
Status: DISCONTINUED | OUTPATIENT
Start: 2025-02-20 | End: 2025-02-20 | Stop reason: HOSPADM

## 2025-02-19 RX ORDER — ONDANSETRON 4 MG/1
4 TABLET, ORALLY DISINTEGRATING ORAL EVERY 8 HOURS PRN
Status: DISCONTINUED | OUTPATIENT
Start: 2025-02-19 | End: 2025-02-19 | Stop reason: SDUPTHER

## 2025-02-19 RX ORDER — VIT C/E/ZN/COPPR/LUTEIN/ZEAXAN 250MG-90MG
1000 CAPSULE ORAL DAILY
Status: DISCONTINUED | OUTPATIENT
Start: 2025-02-20 | End: 2025-02-20 | Stop reason: HOSPADM

## 2025-02-19 RX ORDER — KETOROLAC TROMETHAMINE 15 MG/ML
15 INJECTION, SOLUTION INTRAMUSCULAR; INTRAVENOUS EVERY 6 HOURS
Status: COMPLETED | OUTPATIENT
Start: 2025-02-19 | End: 2025-02-20

## 2025-02-19 RX ORDER — PANTOPRAZOLE SODIUM 40 MG/1
40 TABLET, DELAYED RELEASE ORAL
Status: DISCONTINUED | OUTPATIENT
Start: 2025-02-20 | End: 2025-02-19 | Stop reason: SDUPTHER

## 2025-02-19 RX ADMIN — SODIUM CHLORIDE, SODIUM LACTATE, POTASSIUM CHLORIDE, AND CALCIUM CHLORIDE 50 ML/HR: 600; 310; 30; 20 INJECTION, SOLUTION INTRAVENOUS at 13:29

## 2025-02-19 RX ADMIN — ACETAMINOPHEN 650 MG: 325 TABLET ORAL at 20:23

## 2025-02-19 RX ADMIN — KETOROLAC TROMETHAMINE 15 MG: 15 INJECTION, SOLUTION INTRAMUSCULAR; INTRAVENOUS at 20:22

## 2025-02-19 RX ADMIN — OXYCODONE HYDROCHLORIDE 10 MG: 5 TABLET ORAL at 13:03

## 2025-02-19 RX ADMIN — OXYCODONE HYDROCHLORIDE 10 MG: 10 TABLET, FILM COATED, EXTENDED RELEASE ORAL at 07:45

## 2025-02-19 RX ADMIN — METOPROLOL TARTRATE 100 MG: 50 TABLET, FILM COATED ORAL at 20:32

## 2025-02-19 RX ADMIN — ONDANSETRON 4 MG: 2 INJECTION INTRAMUSCULAR; INTRAVENOUS at 10:16

## 2025-02-19 RX ADMIN — TRANEXAMIC ACID 1000 MG: 100 INJECTION, SOLUTION INTRAVENOUS at 08:40

## 2025-02-19 RX ADMIN — KETAMINE HYDROCHLORIDE 50 MG: 50 INJECTION, SOLUTION INTRAMUSCULAR; INTRAVENOUS at 09:50

## 2025-02-19 RX ADMIN — DOCUSATE SODIUM 100 MG: 100 CAPSULE, LIQUID FILLED ORAL at 20:31

## 2025-02-19 RX ADMIN — PROPOFOL 120 MCG/KG/MIN: 10 INJECTION, EMULSION INTRAVENOUS at 08:28

## 2025-02-19 RX ADMIN — Medication 100 MCG: at 09:18

## 2025-02-19 RX ADMIN — VANCOMYCIN 1.5 G: 1.5 INJECTION, SOLUTION INTRAVENOUS at 07:49

## 2025-02-19 RX ADMIN — SCOPOLAMINE 1 PATCH: 1.5 PATCH, EXTENDED RELEASE TRANSDERMAL at 07:45

## 2025-02-19 RX ADMIN — MIDAZOLAM 2 MG: 1 INJECTION INTRAMUSCULAR; INTRAVENOUS at 08:16

## 2025-02-19 RX ADMIN — ACETAMINOPHEN 975 MG: 325 TABLET ORAL at 07:44

## 2025-02-19 RX ADMIN — FLECAINIDE ACETATE 100 MG: 50 TABLET ORAL at 20:32

## 2025-02-19 RX ADMIN — KETOROLAC TROMETHAMINE 15 MG: 15 INJECTION, SOLUTION INTRAMUSCULAR; INTRAVENOUS at 13:28

## 2025-02-19 RX ADMIN — VANCOMYCIN 1.5 G: 1.5 INJECTION, SOLUTION INTRAVENOUS at 20:26

## 2025-02-19 RX ADMIN — TRANEXAMIC ACID 1000 MG: 100 INJECTION, SOLUTION INTRAVENOUS at 09:50

## 2025-02-19 RX ADMIN — LIDOCAINE HYDROCHLORIDE 5 ML: 10 INJECTION, SOLUTION EPIDURAL; INFILTRATION; INTRACAUDAL; PERINEURAL at 08:33

## 2025-02-19 RX ADMIN — MIDAZOLAM 2 MG: 1 INJECTION INTRAMUSCULAR; INTRAVENOUS at 08:03

## 2025-02-19 RX ADMIN — PREGABALIN 75 MG: 75 CAPSULE ORAL at 07:45

## 2025-02-19 RX ADMIN — Medication 100 MCG: at 09:50

## 2025-02-19 RX ADMIN — HYDROMORPHONE HYDROCHLORIDE 0.4 MG: 2 INJECTION, SOLUTION INTRAMUSCULAR; INTRAVENOUS; SUBCUTANEOUS at 09:01

## 2025-02-19 RX ADMIN — MELOXICAM 7.5 MG: 7.5 TABLET ORAL at 07:45

## 2025-02-19 RX ADMIN — FENTANYL CITRATE 50 MCG: 0.05 INJECTION, SOLUTION INTRAMUSCULAR; INTRAVENOUS at 10:51

## 2025-02-19 RX ADMIN — Medication 200 MCG: at 09:05

## 2025-02-19 RX ADMIN — KETAMINE HYDROCHLORIDE 50 MG: 50 INJECTION, SOLUTION INTRAMUSCULAR; INTRAVENOUS at 09:07

## 2025-02-19 RX ADMIN — POVIDONE-IODINE 1 APPLICATION: 5 SOLUTION TOPICAL at 07:44

## 2025-02-19 RX ADMIN — Medication 200 MCG: at 09:29

## 2025-02-19 RX ADMIN — ACETAMINOPHEN 650 MG: 325 TABLET ORAL at 13:29

## 2025-02-19 RX ADMIN — SODIUM CHLORIDE, POTASSIUM CHLORIDE, SODIUM LACTATE AND CALCIUM CHLORIDE: 600; 310; 30; 20 INJECTION, SOLUTION INTRAVENOUS at 08:16

## 2025-02-19 RX ADMIN — APIXABAN 2.5 MG: 2.5 TABLET, FILM COATED ORAL at 20:31

## 2025-02-19 RX ADMIN — METFORMIN HYDROCHLORIDE 500 MG: 500 TABLET ORAL at 16:44

## 2025-02-19 RX ADMIN — MEPIVACAINE HYDROCHLORIDE 3 ML: 15 INJECTION, SOLUTION EPIDURAL; INFILTRATION at 08:29

## 2025-02-19 RX ADMIN — PROPOFOL 80 MG: 10 INJECTION, EMULSION INTRAVENOUS at 08:29

## 2025-02-19 RX ADMIN — Medication 2 L/MIN: at 13:22

## 2025-02-19 RX ADMIN — FENTANYL CITRATE 50 MCG: 50 INJECTION INTRAMUSCULAR; INTRAVENOUS at 08:03

## 2025-02-19 SDOH — HEALTH STABILITY: MENTAL HEALTH: CURRENT SMOKER: 0

## 2025-02-19 ASSESSMENT — PAIN SCALES - GENERAL
PAINLEVEL_OUTOF10: 10 - WORST POSSIBLE PAIN
PAINLEVEL_OUTOF10: 0 - NO PAIN
PAINLEVEL_OUTOF10: 10 - WORST POSSIBLE PAIN
PAINLEVEL_OUTOF10: 3
PAINLEVEL_OUTOF10: 5 - MODERATE PAIN
PAINLEVEL_OUTOF10: 6
PAINLEVEL_OUTOF10: 5 - MODERATE PAIN
PAIN_LEVEL: 0
PAINLEVEL_OUTOF10: 0 - NO PAIN
PAINLEVEL_OUTOF10: 0 - NO PAIN
PAINLEVEL_OUTOF10: 8
PAINLEVEL_OUTOF10: 3

## 2025-02-19 ASSESSMENT — PAIN - FUNCTIONAL ASSESSMENT
PAIN_FUNCTIONAL_ASSESSMENT: 0-10
PAIN_FUNCTIONAL_ASSESSMENT: WONG-BAKER FACES
PAIN_FUNCTIONAL_ASSESSMENT: 0-10
PAIN_FUNCTIONAL_ASSESSMENT: WONG-BAKER FACES
PAIN_FUNCTIONAL_ASSESSMENT: 0-10
PAIN_FUNCTIONAL_ASSESSMENT: 0-10
PAIN_FUNCTIONAL_ASSESSMENT: WONG-BAKER FACES
PAIN_FUNCTIONAL_ASSESSMENT: 0-10
PAIN_FUNCTIONAL_ASSESSMENT: 0-10

## 2025-02-19 ASSESSMENT — ACTIVITIES OF DAILY LIVING (ADL)
LACK_OF_TRANSPORTATION: NO
ADL_ASSISTANCE: INDEPENDENT

## 2025-02-19 ASSESSMENT — COGNITIVE AND FUNCTIONAL STATUS - GENERAL
WALKING IN HOSPITAL ROOM: A LITTLE
CLIMB 3 TO 5 STEPS WITH RAILING: A LITTLE
CLIMB 3 TO 5 STEPS WITH RAILING: A LITTLE
DRESSING REGULAR LOWER BODY CLOTHING: A LITTLE
DRESSING REGULAR LOWER BODY CLOTHING: A LITTLE
STANDING UP FROM CHAIR USING ARMS: A LITTLE
MOVING TO AND FROM BED TO CHAIR: A LITTLE
HELP NEEDED FOR BATHING: A LITTLE
DAILY ACTIVITIY SCORE: 21
TURNING FROM BACK TO SIDE WHILE IN FLAT BAD: A LITTLE
TOILETING: A LITTLE
WALKING IN HOSPITAL ROOM: A LITTLE
HELP NEEDED FOR BATHING: A LITTLE
MOBILITY SCORE: 20
MOBILITY SCORE: 19
STANDING UP FROM CHAIR USING ARMS: A LITTLE
TOILETING: A LITTLE
WALKING IN HOSPITAL ROOM: A LITTLE
DRESSING REGULAR UPPER BODY CLOTHING: A LITTLE
MOBILITY SCORE: 19
STANDING UP FROM CHAIR USING ARMS: A LITTLE
CLIMB 3 TO 5 STEPS WITH RAILING: A LITTLE
DAILY ACTIVITIY SCORE: 20
MOVING TO AND FROM BED TO CHAIR: A LITTLE
MOVING TO AND FROM BED TO CHAIR: A LITTLE
TURNING FROM BACK TO SIDE WHILE IN FLAT BAD: A LITTLE

## 2025-02-19 ASSESSMENT — PAIN DESCRIPTION - LOCATION
LOCATION: KNEE
LOCATION: KNEE

## 2025-02-19 ASSESSMENT — PAIN DESCRIPTION - DESCRIPTORS
DESCRIPTORS: ACHING
DESCRIPTORS: DULL;THROBBING
DESCRIPTORS: ACHING
DESCRIPTORS: ACHING

## 2025-02-19 ASSESSMENT — PAIN DESCRIPTION - ORIENTATION
ORIENTATION: LEFT
ORIENTATION: LEFT

## 2025-02-19 ASSESSMENT — PAIN SCALES - PAIN ASSESSMENT IN ADVANCED DEMENTIA (PAINAD): TOTALSCORE: MEDICATION (SEE MAR)

## 2025-02-19 NOTE — PROGRESS NOTES
55 yr old female admitted extended recovery following left knee replacement with Dr. Perrin.   Plan is home tomorrow with [facility] PT. SOC pending confirmation on 2/21/25.  Post op follow up confirmed on Thursday Apr 3, 2025 10:40 AM-Trey.   02/19/25 1241   Discharge Planning   Living Arrangements Family members   Support Systems Family members   Assistance Needed transportation   Type of Residence Private residence   Number of Stairs to Enter Residence 2   Number of Stairs Within Residence 14   Do you have animals or pets at home? No   Who is requesting discharge planning? Provider   Home or Post Acute Services In home services   Type of Home Care Services Home PT   Expected Discharge Disposition Home H  ( Home Care)   Does the patient need discharge transport arranged? No   Financial Resource Strain   How hard is it for you to pay for the very basics like food, housing, medical care, and heating? Not hard   Housing Stability   In the last 12 months, was there a time when you were not able to pay the mortgage or rent on time? N   In the past 12 months, how many times have you moved where you were living? 0   At any time in the past 12 months, were you homeless or living in a shelter (including now)? N   Transportation Needs   In the past 12 months, has lack of transportation kept you from medical appointments or from getting medications? no   In the past 12 months, has lack of transportation kept you from meetings, work, or from getting things needed for daily living? No   Patient Choice   Provider Choice list and CMS website (https://medicare.gov/care-compare#search) for post-acute Quality and Resource Measure Data were provided and reviewed with: Patient   Patient / Family choosing to utilize agency / facility established prior to hospitalization No   Stroke Family Assessment   Stroke Family Assessment Needed No

## 2025-02-19 NOTE — CARE PLAN
Problem: Balance  Goal: LTG - Patient will demonstrate Intervention to enhance balance for safe completion of daily activities  Outcome: Progressing  Goal: LTG - Patient will maintain balance to allow for safe mobility  Outcome: Progressing     Problem: Mobility  Goal: LTG - Patient will ambulate community distance with RW at a modified independent level.    Outcome: Progressing  Goal: LTG - Patient will navigate 4-6 steps with rails/device with cane at a modified independent level.    Outcome: Progressing     Problem: Safety  Goal: LTG - Patient will demonstrate safety requirements appropriate to situation/environment  Outcome: Progressing     Problem: PT Transfers  Goal: LTG - Patient will demonstrate safe transfer techniques with RW at a modified independent level.    Outcome: Progressing     Problem: Pain  Goal: LTG - Patient will manage pain with the appropriate technique/Intervention  Outcome: Progressing     Problem: Compromised Skin Integrity  Goal: LTG - Patient will be free from infection  Outcome: Progressing

## 2025-02-19 NOTE — ANESTHESIA PROCEDURE NOTES
Peripheral Block    Patient location during procedure: pre-op  Start time: 2/19/2025 8:10 AM  End time: 2/19/2025 8:11 AM  Reason for block: at surgeon's request and post-op pain management  Staffing  Performed: attending   Authorized by: Lew Bhardwaj MD    Performed by: Oli Rudolph MD  Preanesthetic Checklist  Completed: patient identified, IV checked, site marked, risks and benefits discussed, surgical consent, monitors and equipment checked, pre-op evaluation and timeout performed   Timeout performed at: 2/19/2025 8:02 AM  Peripheral Block  Patient position: laying flat  Prep: ChloraPrep  Patient monitoring: heart rate, cardiac monitor and continuous pulse ox  Block type: adductor canal  Laterality: left  Injection technique: single-shot  Guidance: ultrasound guided  Needle  Needle gauge: 21 G  Needle length: 10 cm  Needle localization: ultrasound guidance  Test dose: negative  Assessment  Injection assessment: negative aspiration for heme, no paresthesia on injection, incremental injection and local visualized surrounding nerve on ultrasound  Paresthesia pain: none  Heart rate change: no  Slow fractionated injection: yes  Additional Notes  Ropivicaine 20 ml 0.5 % with 5 mg Decadron

## 2025-02-19 NOTE — NURSING NOTE
Pt arrived to PACU, orders reviewed, pt stable.    1050- Pt awake complaining of chest pain, Dr. Bhardwaj made aware and at the bedside. Fentanyl to be given and EKG to be completed.  1057-EKG performed and verified by Dr. Bhardwaj, pt denies chest pain, but complains of SOB. Lung sounds are clear, pt able to fall asleep easily, oxygen 96% on 3L nasal canula.    1105- x-ray at bedside    1130- Dr. Bhardwaj made aware of pt diastolic pressure measurements while in PACU, no interventions required, pt okay for transfer to nursing unit. Pt prepared for transfer to nursing unit, report called to Rito.

## 2025-02-19 NOTE — DISCHARGE SUMMARY
MD Renetta Yang, MPAS, PAChloeC, ATC  Adult Reconstruction and Joint Replacement Surgery  Phone: [phone]     Fax:[phone]             Discharge Summary    Discharge Diagnosis  Left Total Knee Arthroplasty     Issues Requiring Follow-Up  Home care services to start within 48 hours. Outpatient PT to start 2 weeks  S/P total Joint for Knees only. Hips optional.    Test Results Pending At Discharge  Pending Labs       No current pending labs.          Hospital Course  Patient underwent Left Total Knee Arthroplasty  on 2/19 without complications. The patient was then taken to the PACU in stable condition. Patient was then transferred to the or.  Pain was appropriately controlled. Diet was advanced as tolerated. Patient progressed adequately through their recovery during hospital stay including PT/ OT and were recommended for discharge. Patient was then discharged on  to home in stable condition. Patient had uneventful hospital course. Patient was instructed on the use of pain medications as needed for pain. The signs and symptoms of infection were discussed and the patient was given our number to call should they have any questions or concerns following discharge.    Based on my clinical judgment, the patient was provided with a 7-day prescription for opioid medication at 30 MED, indicated for treatment of acute pain in the setting of recent Total Joint Arthroplasty. OARRS report was run and has demonstrated an appropriate time course.  The patient has been provided with counseling pertaining to safe use of opioid medication.    Patient may use operative extremity WBAT with use of walker for assistance with ambulation .  Mepilex dressing to be removed POD # 7 by home care and incision left open to air  OAC for DVT prophylaxis started on POD #1 and to be taken for 30 days    Patient is to follow-up in 6 weeks at scheduled post-op visit.     Face-to Face after surgery progress  note  Pertinent Physical Exam At Time of Discharge  Review of Systems   Constitutional: Negative.  Negative for activity change, chills, fatigue and fever.   HENT: Negative.     Eyes: Negative.    Respiratory: Negative.  Negative for cough, chest tightness, shortness of breath and wheezing.    Cardiovascular: Negative.  Negative for palpitations.   Gastrointestinal:  Negative for abdominal pain, blood in stool, nausea and vomiting.   Endocrine: Negative.  Negative for cold intolerance and polyuria.   Genitourinary: Negative.  Negative for difficulty urinating, dysuria, frequency, hematuria and urgency.   Musculoskeletal:  Positive for gait problem and joint swelling. Negative for arthralgias and back pain.   Skin: Negative.  Negative for color change, pallor, rash and wound.   Allergic/Immunologic: Negative.  Negative for environmental allergies.   Neurological:  Negative for dizziness, weakness and light-headedness.   Hematological: Negative.    Psychiatric/Behavioral:  Negative for agitation, confusion and suicidal ideas. The patient is not nervous/anxious.    All other systems reviewed and are negative    Physical Exam  side: left knee  Vitals and nursing note reviewed. VSS, Afebrile  Constitutional:       Appearance: Normal appearance, awake and alert.  HENT:      Head: Normocephalic and atraumatic.       Pupils: Pupils are equal, round, and reactive to light.   Cardiovascular:      Rate and Rhythm: Normal rate and regular rhythm.   Pulmonary:      Effort: Pulmonary effort is normal.     Abdominal:         Palpations: Abdomen is soft.   Musculoskeletal:   Sensation intact bilaterally, sural/saph/sp/tibal n.  Motor intact flexion/extension/DF/PF/EHL/FHL bilaterally. Palpable symmetric DP/PT pulse bilaterally. Spinal wearing off.    Skin:      Bulky Dressing intact to the surgical extremity. No signs of gross bloody or purulent drainage.     General: Skin is warm and dry.      Capillary Refill: Capillary refill  takes less than 2 seconds.   Neurological:      General: No focal deficit present.      Mental Status: She is alert and oriented to person, place, and time. Mental status is at baseline.   Psychiatric:         Mood and Affect: Mood normal.        Home Medications  Scheduled medications    Current Facility-Administered Medications:     scopolamine (Transderm-Scop) patch 1 patch, 1 patch, transdermal, q72h, Renetta Genao PA-C, 1 patch at 02/19/25 0745     PRN medications      Discharge medications     Your medication list        START taking these medications        Instructions Last Dose Given Next Dose Due   docusate sodium 100 mg capsule  Commonly known as: Colace      Take 1 capsule (100 mg) by mouth 2 times a day.       oxyCODONE 5 mg immediate release tablet  Commonly known as: Roxicodone      Take 1 tablet (5 mg) by mouth every 6 hours if needed for severe pain (7 - 10) for up to 7 days.       Pain Relief ES (acetaminophen) 500 mg tablet  Generic drug: acetaminophen      Take 2 tablets (1,000 mg) by mouth every 6 hours if needed for mild pain (1 - 3).       pantoprazole 40 mg EC tablet  Commonly known as: ProtoNix      Take 1 tablet (40 mg) by mouth once daily. Do not crush, chew, or split.       polyethylene glycol 17 gram/dose powder  Commonly known as: Glycolax, Miralax      Mix 1 cap (17g) of powder into 8 ounces of fluid and drink once daily.       traMADol 50 mg tablet  Commonly known as: Ultram      Take 1 tablet (50 mg) by mouth every 6 hours if needed for severe pain (7 - 10) for up to 7 days.              CHANGE how you take these medications        Instructions Last Dose Given Next Dose Due   apixaban 5 mg tablet  Commonly known as: Eliquis  What changed: Another medication with the same name was added. Make sure you understand how and when to take each.           Eliquis 2.5 mg tablet  Generic drug: apixaban  What changed: You were already taking a medication with the same name, and this  prescription was added. Make sure you understand how and when to take each.      Take 1 tablet (2.5 mg) by mouth 2 times a day for 5 days. After 5 days, resume regular home dose.              CONTINUE taking these medications        Instructions Last Dose Given Next Dose Due   albuterol 90 mcg/actuation inhaler           Alcohol Prep Pads pads, medicated  Generic drug: alcohol swabs           atorvastatin 40 mg tablet  Commonly known as: Lipitor           diphenhydrAMINE 25 mg capsule  Commonly known as: BENADryl           ergocalciferol 1.25 MG (66276 UT) capsule  Commonly known as: Vitamin D-2           Fasenra 30 mg/mL injection  Generic drug: benralizumab      Inject 1 Syringe (30 mg) under the skin every 28 (twenty-eight) days.       benralizumab 30 mg/mL injection  Commonly known as: Fasenra      Inject 1 Syringe (30 mg) under the skin every 8 (eight) weeks.       benralizumab 30 mg/mL injection  Commonly known as: Fasenra      Inject 1 mL (30 mg) under the skin every 8 (eight) weeks.       flecainide 100 mg tablet  Commonly known as: Tambocor           folic acid 1 mg tablet  Commonly known as: Folvite           linaCLOtide 145 mcg capsule  Commonly known as: Linzess           lisinopril 40 mg tablet           meclizine 25 mg tablet  Commonly known as: Antivert           metFORMIN 500 mg tablet  Commonly known as: Glucophage           metoprolol tartrate 100 mg tablet  Commonly known as: Lopressor           montelukast 10 mg tablet  Commonly known as: Singulair           omeprazole 40 mg DR capsule  Commonly known as: PriLOSEC           OneTouch Delica Plus Lancet 33 gauge misc  Generic drug: lancets           OneTouch Verio Flex meter misc  Generic drug: blood-glucose meter           OneTouch Verio test strips strip  Generic drug: blood sugar diagnostic           tirzepatide 7.5 mg/0.5 mL pen injector           torsemide 20 mg tablet  Commonly known as: Demadex      Take 1 tablet (20 mg) by mouth once  daily.       VITAMIN B-12 ORAL           Zofran 4 mg tablet  Generic drug: ondansetron                  ASK your doctor about these medications        Instructions Last Dose Given Next Dose Due   budesonide-formoteroL 80-4.5 mcg/actuation inhaler  Commonly known as: Symbicort      Inhale 2 puffs 2 times a day as needed (shortness of breath). Rinse mouth with water after use to reduce aftertaste and incidence of candidiasis. Do not swallow.       diclofenac sodium 1 % gel  Commonly known as: Voltaren      Apply 1 Application topically 4 times a day as needed (pain).       fluticasone 50 mcg/actuation nasal spray  Commonly known as: Flonase      Administer 2 sprays into each nostril once daily. Shake gently. Before first use, prime pump. After use, clean tip and replace cap.       loratadine 10 mg tablet  Commonly known as: Claritin                     Where to Get Your Medications        These medications were sent to [facility]  [address] , [address], [facility] [address]      Hours: 8 AM to 6 PM Mon-Fri, 9 AM to 1 PM Saturday Phone: [phone]   docusate sodium 100 mg capsule  Eliquis 2.5 mg tablet  oxyCODONE 5 mg immediate release tablet  Pain Relief ES (acetaminophen) 500 mg tablet  pantoprazole 40 mg EC tablet  polyethylene glycol 17 gram/dose powder  traMADol 50 mg tablet         You have not been prescribed any medications.     Outpatient Follow-Up  Patient to follow-up with /Renetta Genao PA-C.  Thank you for trusting us with your care. You should be scheduled for a follow-up post-surgical visit in 6 weeks.    Special Instructions  Start eliquis 2.5 MG BID for 4 days, then restart home dose on day 5    Please read discharge instructions provided by your surgeon before calling with questions as this will delay care.    Medication refills-Oxycodone and Tramadol will be refilled every 7 days per state law. Request refills through [facility] at the institution in which you had  surgery or MyChart. All medication requests may take up to 72 hours to refill and refills after Friday 1pm will be refilled on the next business day.      No future appointments.    Naren Li MD  Orthopedic surgery PGY-4

## 2025-02-19 NOTE — NURSING NOTE
Admitted to room 203 from surgery following left knee surgery.Oriented to room.Call light given.Dressing intact with no noted drainage.2+ pedal pulses.Hemovac drain with small amount bloody drainage noted.

## 2025-02-19 NOTE — OP NOTE
Left Knee Total Replacement (DePuy Attune Knee; [place]; **POD**) ** Overnight ** (L) Operative Note     Date: 2025  OR Location: [facility]    Name: Betzaida Jc, : 1970, Age: 55 y.o., MRN: [id number], Sex: female    Diagnosis  Pre-op Diagnosis      * Unilateral primary osteoarthritis, left knee [M17.12] Post-op Diagnosis     * Unilateral primary osteoarthritis, left knee [M17.12]     Procedures  Left Knee Total Replacement (DePuy Attune Knee; [place]; **POD**) ** Overnight **  58136 - GA ARTHRP KNE CONDYLE&PLATU MEDIAL&LAT COMPARTMENTS      Surgeons      * Eric Perrin - Primary    Resident/Fellow/Other Assistant:  Surgeons and Role:  * No surgeons found with a matching role *    Staff:   Surgical Assistant:   Circulator: Meredith Benavides Person: Ananya Benavides Person: Meredith    Anesthesia Staff: Anesthesiologist: Lew Bhardwaj MD  CRNA: SONALI Joiner-VENICE    Procedure Summary  Anesthesia: Spinal  ASA: III  Estimated Blood Loss: 25mL  Intra-op Medications:   Administrations occurring from 0845 to 1115 on 25:   Medication Name Total Dose   ropivacaine-epinephrine-clonidine-ketorolac 2.46-0.005- 0.0008-0.3mg/mL periarticular syringe 50 mL   HYDROmorphone (Dilaudid) injection 2 mg/mL 0.4 mg   ketamine injection 50 mg/mL 100 mg   phenylephrine 100 mcg/mL syringe 10 mL (prefilled) 600 mcg   tranexamic acid (Cyklokapron) injection 1,000 mg   vancomycin (Xellia) 1.5 g in diluent combination  mL Cannot be calculated              Anesthesia Record               Intraprocedure I/O Totals          Intake    Ketamine 0.00 mL    The total shown is the total volume documented since Anesthesia Start was filed.    Tranexamic Acid 0.00 mL    The total shown is the total volume documented since Anesthesia Start was filed.    Total Intake 0 mL          Specimen: No specimens collected              Drains and/or Catheters:   Closed/Suction Drain 1 Left;Anterior Knee Accordion 15 Fr.  (Active)       Tourniquet Times:   * Missing tourniquet times found for documented tourniquets in lo *     Implants:  Implants       Type Name Action Serial No.      Implant CEMENT, GENTAMICIN, SMARSET GHV, 40 GM - [id number] Implanted      Implant CEMENT, GENTAMICIN, SMARSET GHV, 40 GM - [id number] Implanted      Joint Knee INSERT, ATTUNE PS FB, SZ 6, 6MM - [id number] Implanted      Joint Knee DOME, PATELLA, MEDIALIZED, 38MM - [id number] Implanted      Joint Knee FEMORAL, ATTUNE PS, GLENN, NRW, SZ 6, LT - [id number] Implanted       SIZE 5 ATTUNE FIXED BEARING TIBIAL BASE, CEMENTED DUPLICATE ENTRY, PLEASE TRANSITION TO CATALOG # [id number] Implanted               Findings: advanced OA    Indications: Betzaida Jc is an 55 y.o. female who is having surgery for Unilateral primary osteoarthritis, left knee [M17.12].     The patient was seen in the preoperative area. The risks, benefits, complications, treatment options, non-operative alternatives, expected recovery and outcomes were discussed with the patient. The possibilities of reaction to medication, pulmonary aspiration, injury to surrounding structures, bleeding, recurrent infection, the need for additional procedures, failure to diagnose a condition, and creating a complication requiring transfusion or operation were discussed with the patient. The patient concurred with the proposed plan, giving informed consent.  The site of surgery was properly noted/marked if necessary per policy. The patient has been actively warmed in preoperative area. Preoperative antibiotics have been ordered and given within 1 hours of incision. Venous thrombosis prophylaxis have been ordered including bilateral sequential compression devices    Procedure Details: L TKA  Complications:  None; patient tolerated the procedure well.    Disposition: PACU - hemodynamically stable.  Condition: stable     PREOPERATIVE DIAGNOSIS:  left knee osteoarthritis     POSTOPERATIVE  DIAGNOSIS:  left knee osteoarthritis     OPERATION/PROCEDURE:  left total knee arthroplasty     SURGEON:  Eric Perrin MD     ASSISTANT(S):  Naren Li MD PGY4      ANESTHESIA:  Spinal, adductor canal block     LOCATION:  [facility]     ESTIMATED BLOOD LOSS AND INTRAVENOUS FLUIDS:  Please see Anesthesia record.     COMPONENTS USED:  DePuy Attune knee system  1. 6N femur  2. 5 tibia  3. 38 patella  4. 6mm insert     BRIEF CLINICAL NOTE:  The patient is a 54 yo female with advanced osteoarthritis of  their left knee.  They failed conservative treatment and wished to  proceed with total knee arthroplasty which is indicated at this time.  We discussed the risks, benefits, and alternatives of surgery  including but not limited to infection, damage to vessel or nerve,  bleeding, soft tissue pain, DVT, PE, problems with anesthesia, lack  of range of motion, continued soft tissue pain, need for further  surgery, etc.  Consent was obtained.  They were taken to the operating  room in order to undergo the procedure.    PRE-OP ROM: 5-110     OPERATIVE REPORT:  The patient was transferred to the operating room table.  Time-out  was performed confirming patient name, medical record number,  surgical site, and adequate and appropriate imaging.  The patient  received appropriate IV antibiotics as well as tranexamic acid.  Once we were prepped and draped,midline skin incision was performed.  Hemostasis was obtained using electrocautery.  Underlying extensor mechanism was easily identified and entered using a standard medial parapatellar arthrotomy performedsharply.  The infrapatellar and suprapatellar fat pads were debrided.Initial medial release was performed.  The patella was subluxed laterally.  Distal femur was entered using a step drill.  Distal  femoral cut was performed, and the femur was sized and prepared.  The tibia was subluxed forward using a double-prong PCL retractor.  The proximal tibia was cut in neutral  mechanical axis using an  extramedullary tibial guide.  We then used the lamina  to clear out the posterior osteophytes and clear the meniscal remnants. We then sized the tibia and prepared it. We cut the patella freehand using a caliper to restore the native patellar height. We then trialed with multiple polyethylene inserts.  Once I was happy with the position of components and stability of the knee, all trial components were removed.  The  cut bony surfaces were thoroughly irrigated and dried.  The posterior capsule and surrounding soft tissues were injected with  BARBARA solution.  We then cemented into place the real components.  The knee was held in extension until all cement was hardened.  All extraneous cement was  removed.  We then closed the extensor mechanism over a drain using interrupted #2 Ethibond as well as interrupted 0 Vicryl.  The subcu was closed with interrupted 2-0 Vicryl.  The skin was closed with  running 3-0 Biosyn followed by Dermabond and Steri-Strips.  Dry sterile dressing was placed.  The patient was transferred back to the hospital bed without incident or complication.  She will be  weightbearing as tolerated.  They will be on Eliquis and SCDs for DVT  prophylaxis.     Task Performed by RNFA or Surgical Assistant:  preparing the leg, draping the leg, retracting and manipulating the leg during surgery, facilitating safe performance of the procedure, and closure of the wound.    Additional Details: WBAT, Eliquis    Attending Attestation: I was present and scrubbed for the key portions of the procedure.

## 2025-02-19 NOTE — CARE PLAN
The patient's goals for the shift include  pain management.    The clinical goals for the shift include  pain management.

## 2025-02-19 NOTE — ANESTHESIA PROCEDURE NOTES
Spinal Block    Patient location during procedure: OR  Start time: 2/19/2025 8:25 AM  End time: 2/19/2025 8:29 AM  Reason for block: primary anesthetic  Staffing  Performed: CRNA   Authorized by: Lew Bhardwaj MD    Performed by: AMAIRANI Joiner    Preanesthetic Checklist  Completed: patient identified, IV checked, risks and benefits discussed, surgical consent, monitors and equipment checked, pre-op evaluation, timeout performed and sterile techniques followed  Block Timeout  RN/Licensed healthcare professional reads aloud to the Anesthesia provider and entire team: Patient identity, procedure with side and site, patient position, and as applicable the availability of implants/special equipment/special requirements.  Patient on coagulant treatment: no  Timeout performed at:   Spinal Block  Patient position: sitting  Prep: Betadine  Sterility prep: drape, gloves and mask  Sedation level: light sedation  Patient monitoring: blood pressure, continuous pulse oximetry and heart rate  Approach: midline  Vertebral space: L3-4  Injection technique: single-shot  Needle  Needle type: pencil-point   Needle gauge: 24 G  Needle length: 3.5 in  Free flowing CSF: yes    Assessment  Sensory level: T6  Block outcome: patient comfortable

## 2025-02-19 NOTE — CONSULTS
Consults    Reason For Consult  Medical management for history of A-fib GERD DVT hyperlipidemia hypertension and postop pain    History Of Present Illness  Betzaida Jc is a 55 y.o. female presenting with left total knee replacement patient is hemodynamically stable upon arrival from PACU.     Past Medical History  She has a past medical history of Asthma, Chest pain, Chronic allergic rhinitis, Chronic constipation, Collapse of right lung, Dependence on nocturnal oxygen therapy, Diverticulosis, Hiatal hernia with GERD, History of venous thromboembolism (2010), Hyperlipidemia, Hypertension, Irregular heart beat, Migraine, Morbid obesity with BMI of 45.0-49.9, adult (Multi), BRIDGER (obstructive sleep apnea), Paroxysmal atrial fibrillation (Multi), Right renal stone, and Type 2 diabetes mellitus.    Surgical History  She has a past surgical history that includes Knee arthroscopy w/ debridement (Right); MR angio head wo IV contrast (2019); MR angio neck wo IV contrast (2019); CT angio coronary art with heartflow if score >30% (2022); Shoulder arthroscopy w/ rotator cuff repair (Left, 2020); Esophagoscopy / EGD (2023); Colonoscopy; Toe Surgery (Right, 2023); Partial hysterectomy (2006); BI breast right core needle biopsy; Achilles tendon surgery (Left, 2010); and  section, classic.     Social History  She reports that she has never smoked. She has never used smokeless tobacco. She reports current alcohol use of about 2.0 standard drinks of alcohol per week. She reports that she does not use drugs.    Family History  Family History   Problem Relation Name Age of Onset    Thyroid cancer Mother      Kidney cancer Father      Diabetes Other      Hypertension Other      Stroke Other          Allergies  Penicillins and Sulfa (sulfonamide antibiotics)    Review of Systems  10 system review noncontributory  Physical Exam  Patient is alert in no acute distress  Lungs are  clear to auscultation and percussion  Cardiac is regular rate and rhythm normal S1-S2 without murmur gallop rub click S3 or S4  Abdomen is soft nontender positive bowel sounds there is no hepatosplenomegaly or CVA tenderness appreciated  Extremities without cyanosis clubbing erythema or edema  Operative site exam per Ortho extremity is warm pulses are intact patient can dorsiflex plantarflex  Last Recorded Vitals  BP (!) 135/91 (BP Location: Left arm, Patient Position: Lying)   Pulse 84   Temp 36 °C (96.8 °F) (Temporal)   Resp 14   Wt 104 kg (230 lb 2.6 oz)   SpO2 96%     Relevant Results  Scheduled medications  acetaminophen, 650 mg, oral, q6h JORGE  apixaban, 2.5 mg, oral, q12h JORGE  [START ON 2/20/2025] atorvastatin, 40 mg, oral, Daily  [START ON 2/20/2025] cetirizine, 10 mg, oral, Daily  [START ON 2/20/2025] cyanocobalamin, 1,000 mcg, oral, Daily  docusate sodium, 100 mg, oral, BID  ergocalciferol, 1,250 mcg, oral, q7 days  flecainide, 100 mg, oral, BID  fluticasone furoate-vilanteroL, 1 puff, inhalation, Daily  [START ON 2/20/2025] folic acid, 1 mg, oral, Daily  ketorolac, 15 mg, intravenous, q6h  [START ON 2/20/2025] linaCLOtide, 145 mcg, oral, Daily before breakfast  [START ON 2/20/2025] lisinopril, 40 mg, oral, Daily  metFORMIN, 500 mg, oral, BID  metoprolol tartrate, 100 mg, oral, BID  [START ON 2/20/2025] montelukast, 10 mg, oral, Daily  [START ON 2/20/2025] pantoprazole, 40 mg, oral, Daily before breakfast  [START ON 2/20/2025] polyethylene glycol, 17 g, oral, Daily  [START ON 2/20/2025] sodium chloride, 2 spray, Each Nostril, Daily  tirzepatide, 7.5 mg, subcutaneous, q7 days  [START ON 2/20/2025] torsemide, 20 mg, oral, Daily  vancomycin, 1.5 g, intravenous, q12h      Continuous medications  lactated Ringer's, 50 mL/hr, Last Rate: 50 mL/hr (02/19/25 1329)  oxygen, 2 L/min      PRN medications  PRN medications: albuterol, benzocaine-menthol, bisacodyl, bisacodyl, cyclobenzaprine, HYDROmorphone,  meclizine, metoclopramide **OR** metoclopramide, naloxone, [DISCONTINUED] ondansetron ODT **OR** ondansetron, ondansetron ODT, oxyCODONE, oxyCODONE    Results for orders placed or performed during the hospital encounter of 02/19/25 (from the past 24 hours)   POCT GLUCOSE   Result Value Ref Range    POCT Glucose 87 74 - 99 mg/dL   POCT GLUCOSE   Result Value Ref Range    POCT Glucose 119 (H) 74 - 99 mg/dL     *Note: Due to a large number of results and/or encounters for the requested time period, some results have not been displayed. A complete set of results can be found in Results Review.          Assessment/Plan   Status post left total knee replacement  - Management per Ortho  - PT  - Pain control  - Supportive care    History of paroxysmal A-fib  - Currently asymptomatic  - Will continue with current medications    Hypertension  - Continue current medications  - Clinically stable    History of DVT/PE  - Felt secondary to Achilles heel surgery  - Will continue Eliquis which she is on for her A-fib    DVT prophylaxis  - Eliquis  - SCDs  - Ambulate    Asthma  - Continue medications  - Consult respiratory therapy      Fern Lima MD

## 2025-02-19 NOTE — ANESTHESIA POSTPROCEDURE EVALUATION
Patient: Betzaida Jc    Procedure Summary       Date: 02/19/25 Room / Location: [facility] / [facility]    Anesthesia Start: 0816 Anesthesia Stop: 1034    Procedure: Left Knee Total Replacement (DePuy Attune Knee; [place]; **POD**) ** Overnight ** (Left: Knee) Diagnosis:       Unilateral primary osteoarthritis, left knee      (Unilateral primary osteoarthritis, left knee [M17.12])    Surgeons: Eric Perrin MD Responsible Provider: Lew Bhardwaj MD    Anesthesia Type: spinal ASA Status: 3            Anesthesia Type: spinal    Vitals Value Taken Time   /91 02/19/25 1219   Temp 36 °C (96.8 °F) 02/19/25 1219   Pulse 84 02/19/25 1219   Resp 14 02/19/25 1219   SpO2 96 % 02/19/25 1219       Anesthesia Post Evaluation    Patient location during evaluation: bedside  Patient participation: complete - patient participated  Level of consciousness: awake and alert  Pain score: 0  Pain management: adequate  Airway patency: patent  Cardiovascular status: acceptable  Respiratory status: acceptable  Hydration status: acceptable  Postoperative Nausea and Vomiting: none    No notable events documented.

## 2025-02-19 NOTE — ANESTHESIA PREPROCEDURE EVALUATION
Patient: Betzaida Jc    Procedure Information       Date/Time: 02/19/25 0845    Procedure: Left Knee Total Replacement (DePuy Attune Knee; [place]; **POD**) ** Overnight ** (Left: Knee)    Location: [facility] / [facility]    Surgeons: Eric Perrin MD            Relevant Problems   Cardiac   (+) Atrial fibrillation (Multi)   (+) Chest pain of uncertain etiology   (+) Hypertension      Pulmonary   (+) Pulmonary embolism   (+) Severe persistent asthma      Neuro   (+) Chronic paroxysmal hemicrania, not intractable   (+) TIA (transient ischemic attack)      GI   (+) Gastroesophageal reflux disease      Liver   (+) Liver lesion      Endocrine   (+) Diabetes mellitus, type 2 (Multi)   (+) Morbid obesity (Multi)   (+) Obesity      Hematology   (+) DVT (deep venous thrombosis) (Multi)      Musculoskeletal   (+) Left knee DJD   (+) Unilateral primary osteoarthritis, left knee      HEENT   (+) Cyst of maxillary sinus   (+) Hearing loss of right ear   (+) Sinus pressure      ID   (+) Flu syndrome       Clinical information reviewed:   Tobacco  Allergies  Meds   Med Hx  Surg Hx  OB Status  Fam Hx  Soc   Hx        NPO Detail:  NPO/Void Status  Date of Last Liquid: 02/19/25  Time of Last Liquid: 0530 (sip of water with mds)  Date of Last Solid: 02/18/25  Time of Last Solid: 1800  Last Intake Type: Light meal  Time of Last Void: 0730         Physical Exam    Airway  Mallampati: I  TM distance: >3 FB  Neck ROM: full     Cardiovascular - normal exam     Dental    Pulmonary - normal exam     Abdominal - normal exam         Anesthesia Plan    History of general anesthesia?: yes  History of complications of general anesthesia?: no    ASA 3     spinal     The patient is not a current smoker.    Anesthetic plan and risks discussed with patient.  Use of blood products discussed with patient who consented to blood products.

## 2025-02-19 NOTE — PROGRESS NOTES
Physical Therapy Evaluation & Treatment    Patient Name: Betzaida Jc  MRN: [id number]  Department: [facility]  Room: A  Today's Date: 2025   Time Calculation  Start Time: 1512  Stop Time: 1551  Time Calculation (min): 39 min    Assessment/Plan   PT Assessment  PT Assessment Results: Decreased strength, Decreased range of motion, Decreased endurance, Impaired balance, Orthopedic restrictions, Pain, Obesity  Rehab Prognosis: Good  Barriers to Discharge Home: No anticipated barriers  Evaluation/Treatment Tolerance: Patient tolerated treatment well  End of Session Communication: Bedside nurse  Assessment Comment: Pt presents with impaired functional mobility s/p L TKR. Recommend discharge home with 24 hour supervision/intermittent assistance and home health PT. Pt was issued HEP handout. Pt verbalized and demonstrated understanding.   End of Session Patient Position: Up in chair, Alarm on, BLE elevated, ice to surgical site, call light in reach, needs met, RN aware.  IP OR SWING BED PT PLAN  Inpatient or Swing Bed: Inpatient  PT Plan  Treatment/Interventions: Bed mobility, Transfer training, Gait training, Stair training, Balance training, Endurance training, Strengthening, Range of motion, Therapeutic exercise, Therapeutic activity, Home exercise program, Positioning, Postural re-education  PT Plan: Ongoing PT  PT Frequency: BID  PT Discharge Recommendations: Low intensity level of continued care  Equipment Recommended upon Discharge: Wheeled walker  PT Recommended Transfer Status: Contact guard, Assistive device      Subjective     General Visit Information:  General  Reason for Referral: L TKR  Referred By: Dr. Perrin  Past Medical History Relevant to Rehab: OA, HTN, DVT, PE, HLD, DM, asthma, GERD, kidney stones, BRIDGER, hiatal hernia, migraine, Afib, L achilles surgery, , hysterectomy, TIA, anemia, IBS, PUD  Family/Caregiver Present: Yes (son - however he was asleep during PT session.)  Prior to  Session Communication: Bedside nurse  Patient Position Received: Bed, 3 rail up, Alarm on  General Comment: hemovac to L knee in place. L knee post-op dressing dry and intact.     Home Living:  Home Living  Type of Home: House  Lives With: Adult children, Siblings (son and sister)  Home Adaptive Equipment: Walker rolling or standard  Home Layout: Multi-level, Stairs to alternate level with rails, 1/2 bath on main level, Bed/bath upstairs  Alternate Level Stairs-Rails: Left  Alternate Level Stairs-Number of Steps: 12  Home Access: Stairs to enter with rails  Entrance Stairs-Rails: Both (too far apart to reach both)  Entrance Stairs-Number of Steps: 6 (4 steps to enter with no rail; 6 steps to enter with railing)  Prior Level of Function:  Prior Function Per Pt/Caregiver Report  Level of [place]: Independent with ADLs and functional transfers, Independent with homemaking with ambulation  ADL Assistance: Independent  Homemaking Assistance: Independent  Ambulatory Assistance: Independent  Vocational: Full time employment ([employer])  Prior Function Comments: Pt denies falls prior to admission  Precautions:  Precautions  LE Weight Bearing Status: Weight Bearing as Tolerated  Medical Precautions: Fall precautions  Post-Surgical Precautions: Left total knee precautions      Objective   Pain:  Pain Assessment  Pain Assessment: 0-10  0-10 (Numeric) Pain Score: 5 - Moderate pain  Pain Type: Surgical pain  Pain Location: Knee  Pain Orientation: Left  Pain Interventions: Cold applied, Repositioned, Elevated  Cognition:  Cognition  Overall Cognitive Status: Within Functional Limits  Attention: Within Functional Limits  Memory: Within Funtional Limits  Problem Solving: Within Functional Limits  Numeric Reasoning: Within Functional Limits  Abstract Reasoning: Within Functional Limits  Safety/Judgement: Within Functional Limits  Insight: Within function limits    General Assessments:  Activity Tolerance  Endurance: Tolerates 10 -  20 min exercise with multiple rests    Sensation  Light Touch: No apparent deficits    Coordination  Movements are Fluid and Coordinated: Yes    Postural Control  Postural Control: Within Functional Limits    Static Sitting Balance  Static Sitting-Balance Support: Feet supported  Static Sitting-Level of Assistance: Distant supervision  Dynamic Sitting Balance  Dynamic Sitting-Balance Support: Feet supported  Dynamic Sitting-Level of Assistance: Distant supervision    Static Standing Balance  Static Standing-Balance Support: Bilateral upper extremity supported (with RW)  Static Standing-Level of Assistance: Contact guard  Dynamic Standing Balance  Dynamic Standing-Balance Support: Bilateral upper extremity supported (with RW)  Dynamic Standing-Level of Assistance: Contact guard  Functional Assessments:       Bed Mobility  Bed Mobility: Yes  Bed Mobility 1  Bed Mobility 1: Supine to sitting  Level of Assistance 1: Close supervision    Transfers  Transfer: Yes  Transfer 1  Transfer From 1: Bed to  Transfer to 1: Chair with arms  Technique 1: Sit to stand, Stand to sit  Transfer Device 1: Walker  Transfer Level of Assistance 1: Contact guard, Minimal verbal cues (cues for hand placement)    Ambulation/Gait Training  Ambulation/Gait Training Performed: Yes  Ambulation/Gait Training 1  Surface 1: Level tile  Device 1: Rolling walker  Assistance 1: Contact guard, Minimal verbal cues (cues for sequencing)  Quality of Gait 1: Decreased step length, Antalgic (decreased kendall, increased BUE support on RW, no LOB noted, step to pattern.)  Comments/Distance (ft) 1: 8 feet x 1  Extremity/Trunk Assessments:  RUE   RUE : Within Functional Limits  LUE   LUE: Within Functional Limits  RLE   RLE : Within Functional Limits  LLE   LLE : Exceptions to WFL, knee ROM/strength limited due to post-op pain and surgeon restrictions.     Treatments:  Therapeutic Exercise  Therapeutic Exercise Performed: Yes  B ankle pumps, L quad sets, L  gluteal sets, L heel slides, L SAQ, L hip abduction, and L SLR x 10 reps each.    Outcome Measures:  [facility] Basic Mobility  Turning from your back to your side while in a flat bed without using bedrails: None  Moving from lying on your back to sitting on the side of a flat bed without using bedrails: None  Moving to and from bed to chair (including a wheelchair): A little  Standing up from a chair using your arms (e.g. wheelchair or bedside chair): A little  To walk in hospital room: A little  Climbing 3-5 steps with railing: A little  Basic Mobility - Total Score: 20    Encounter Problems       Encounter Problems (Active)       Balance       LTG - Patient will demonstrate Intervention to enhance balance for safe completion of daily activities (Progressing)       Start:  02/19/25            LTG - Patient will maintain balance to allow for safe mobility (Progressing)       Start:  02/19/25               Compromised Skin Integrity       LTG - Patient will be free from infection (Progressing)       Start:  02/19/25               Mobility       LTG - Patient will ambulate community distance with RW at a modified independent level.   (Progressing)       Start:  02/19/25            LTG - Patient will navigate 4-6 steps with rails/device with cane at a modified independent level.   (Progressing)       Start:  02/19/25               PT Transfers       LTG - Patient will demonstrate safe transfer techniques with RW at a modified independent level.   (Progressing)       Start:  02/19/25               Safety       LTG - Patient will demonstrate safety requirements appropriate to situation/environment (Progressing)       Start:  02/19/25                   Education Documentation  Handouts, taught by Aida Echeverria, PT at 2/19/2025  3:12 PM.  Learner: Patient  Readiness: Acceptance  Method: Explanation, Demonstration, Handout  Response: Verbalizes Understanding, Demonstrated Understanding    Precautions, taught by Aida ADRIAN  TARA Echeverria at 2/19/2025  3:12 PM.  Learner: Patient  Readiness: Acceptance  Method: Explanation, Demonstration, Handout  Response: Verbalizes Understanding, Demonstrated Understanding    Body Mechanics, taught by Aida Echeverria PT at 2/19/2025  3:12 PM.  Learner: Patient  Readiness: Acceptance  Method: Explanation, Demonstration, Handout  Response: Verbalizes Understanding, Demonstrated Understanding    Home Exercise Program, taught by Aida Echeverria PT at 2/19/2025  3:12 PM.  Learner: Patient  Readiness: Acceptance  Method: Explanation, Demonstration, Handout  Response: Verbalizes Understanding, Demonstrated Understanding    Mobility Training, taught by Aida Echeverria PT at 2/19/2025  3:12 PM.  Learner: Patient  Readiness: Acceptance  Method: Explanation, Demonstration, Handout  Response: Verbalizes Understanding, Demonstrated Understanding    Education Comments  No comments found.    Aida Echeverria, PT, DPT

## 2025-02-19 NOTE — DISCHARGE INSTRUCTIONS
MD Renetta Yang MPAS, TAYLOR, ATC  Adult Reconstruction and Joint Replacement Surgery  Phone: [phone]     Fax: [phone]        DISCHARGE INSTRUCTIONS      PLEASE READ CAREFULLY BEFORE CONTACTING YOUR PROVIDER.    WE WORK COLLABORATIVELY AS A TEAM. CALLING MULTIPLE STAFF MEMBERS REGARDING THE SAME ISSUE WILL DELAY YOUR CARE.    [employer] IS THE PREFERRED COMMUNICATION FOR ALL TEAM MEMBERS.    POSTOPERATIVE INSTRUCTIONS: TOTAL HIP & TOTAL KNEE ARTHROPLASTY    JOINT CARE TEAM  Please use the information below to contact your care team following surgery.  If you are leaving a message or using the [facility] Chart portal, please include your full name, date of birth and date of surgery so that we can correctly identify you.  Your call will be returned within 1-2 business days, please do not leave multiple messages with other staff regarding a single issue while you are awaiting a return call.     Who to call Contact Information Matters needing handled   Renetta Genao PA-C  Physician Assistant [facility]   Medical questions/concerns       Blake Elliott-    [email or web address]           [phone]  opt. 2    [phone] fax  [phone]         Scheduling office Visits  Medical questions/concerns  Leave of Absence or other paperwork  Any concerns more than 6 weeks from surgery - an appointment will need to be made   Lashae Boone MBA, BSN, RN-BC  Ortho Nurse Navigator [place]        __________________________________    Regan Thomas RN, BSN  Ortho Coordinator [place] CLEANINGN-BC  Ortho Nurse Navigator [place]       [phone] Please call staff at the institution in which you had surgery for prescription refills        Prescription Refills  Nursing, medical question related questions or concerns within 6 weeks of surgery   Orders for Outpatient Physical Therapy             MEDICATION  REFILLS - MyChart or Nurse Navigator (Above) at the institution in which you had surgery. [facility] [place] or [facility].    -You will NOT receive a call indicating that your prescription has been filled.  Please contact your pharmacy with any questions.    Medication refills will be filled Monday-Friday 7am to 1pm ONLY. Please call the nurse navigator office or send a [employer] message for a refill request.  Any requests received outside of this timeframe will be handled on the next business day.  Please do not call multiple times or call other members of the care team for medication needs, this will cause the refill to take longer.    Per State and Institutional policy, pain medications can only be refilled every 7 days for up to six weeks following surgery.    My Chart Portal: If you are using the My Chart portal and are requesting a medication refill, please list what type of surgery you had and left or right side, medication that needs refilled, and pharmacy you would like your medication sent.     WEIGHT BEARING- weight bearing as tolerated to operative extremity     ACTIVITY-As Tolerated    DRIVING & TRAVEL AFTER SURGERY   Patients should anticipate waiting at least 4-6 weeks before traveling long distances after surgery.  You will need to stop to walk around ever 1 hour during your travel to help with blood clot prevention.    Patients may not drive until cleared by the joint nurse or the office and you are off of all narcotics.    DENTAL PROCEDURES & CLEANINGS  You must wait a minimum of 3 months for elective dental appointments after a total joint replacement, including routine cleanings or dental work including bridges, crowns, extractions, etc.. Unless, it is an emergency. You will need a prophylactic antibiotic lifelong prior to any dental visit, cleaning or procedure. Your surgeon's office or your dentist may provide a prescription antibiotic. Antibiotics are a lifelong need before dental appointments.      You  do not need antibiotics for endoscopic procedures such as colonoscopy or EGD, dermatologic biopsies or eye surgeries.    WOUND CARE  If you experience continued drainage or bleeding, you may cover with abdominal/Maxi pads (purchase at local drug store).  Knee replacements should wrap with an ace wrap.  You may shower with waterproof dressing on. Your surgical bandage will be removed by your home therapist 1 week after surgery. If you have staples intact, home care will remove in 2 weeks. If you have sutures intact, you will need to return to the office in 2 weeks for suture removal. Once the dressing is removed by home care, you may continue to shower. Let soap and water wash over the wound. DO NOT SCRUB.  Steri-strips under the bandage will remain in place until they fall off on their own.  If they are loose, you may gently remove.  If they have not fallen off in two weeks, gently peel them off. Do not remove if pulling causes resistance against the incision.  You will see suture tails sticking out of the ends of the incision.  DO NOT CUT THEM.  They will fall off when the sutures dissolve.  If they are bothersome, cover with a band aid.  Do not soak in a bath tub, hot tub, pool or lake until you are 8 weeks out from surgery.  Do not apply lotions, creams or ointments until you are 6 weeks out from surgery,    PAIN, SWELLING, BRUISING & CLICKING  Pain and swelling are a natural part of your recovery which is considered normal for up to a year after surgery.  Symptoms may be treated with movement, ice, compression stockings, elevating your leg, and by following the pain medication regimen as prescribed.  Bruising is normal for several weeks after surgery. You may also have leg swelling and pain in your shin.  You may ice areas that are tender to help with discomfort.  You are required to wear the provided compression stockings, every day, for 4 weeks following surgery.  Remove the stockings at night and place them  back on in the morning.  Pain and swelling may temporarily increase with an increase in activity or exercise.  Use ice after activity.  Audible clicking with movement or exercises is considered normal following joint replacement.  If this persists at 6 months or 1 year, please notify your surgeon.  You may also feel decreased sensation or numbness near the incision site.  This is normal and sensation may or may not return.    PERSONAL HYGIENE  You may shower upon discharging from the hospital.  Soap and water is permitted to run over the surgical dressing, steri-strips and incision.  Do not scrub directly over these items.  DO NOT soak your incision in a bath, hot tub, pool or pond/lake for a minimum of 8 weeks following your surgery.  DO NOT use lotions, creams, ointments on your wound for a minimum of 6 weeks following your surgery. At that time you may use vitamin E to assist with softening of your incision.      RESTARTING HOME ROUTINE - DIET & MEDICATIONS  Post-operative constipation can result due to a combination of inactivity, anesthesia and pain medication. To help prevent this, you should increase your water and fiber intake. Physical activity such as walking will also help stimulate the bowels.   You may resume your normal diet when you discharge home.    To avoid constipation, choose foods that help promote good bowel habits, such as foods high in fiber.  You may restart your home medications the following day after your surgery UNLESS you have been given alternate instructions.  Follow the instructions given to you on your hospital discharge instructions for more information regarding your home medications.  IF YOU EXPERIENCE NAUSEA OR DIARRHEA, FOLLOW THE B.R.A.T. DIET UNTIL SYMPTOMS RESOLVE.  If you are experiencing vomiting that lasts more than 24 hours, please contact your joint nurse.      IN-HOME PHYSICAL THERAPY & OUTPATIENT PHYSICAL THERAPY  In-home physical therapy will start 1-2 days after you  get home from the hospital.    The home care agency will call within the first 24-48 hours to set up their first visit.  Please do not call your care team to inquire during this timeframe.  Continue the exercises you were given in the hospital until you have been seen by in-home therapy.  Make sure to provide a phone number with the ability for the home care staff to leave a message if you do not answer your phone.    Outpatient physical therapy following knee replacement surgery should begin 2-3 weeks after surgery.  You will be given physical therapy order prior to discharge from the hospital. You should call to schedule this appointment ASAP if not already scheduled before surgery.  Waiting until you are ready for outpatient physical therapy will cause a delay in your care.  You may choose any outpatient physical therapy location.      EMERGENCIES - WHEN TO CONTACT THE SURGEON'S OFFICE IMMEDIATELY  Fever >101 with chills that has been present for at least 48 hours.   Excessive bleeding from incision that will not slow down. A small amount of drainage is normal and expected.  Once pressure is applied and the area is covered, do not continue to check the area regularly.  This will remove pressure and bleeding will continue.  Leave in place for 4-6 hours.  Signs of infection of incision-excessive drainage that is soaking through your dressing (especially if it is pus-like), redness that is spreading out from the edges of your incision, or increased warmth around the area.  Excruciating pain for which the pain medication, taken as instructed, is not helping.  Severe calf pain.  Go directly to the emergency room or call 911, if you are experiencing chest pain or difficulty breathing.    After Hour and Weekend Emergency Answering Service [phone]    ICE & COLD THERAPY INSTRUCTIONS    To assist with pain control and post-op swelling, you should be using ice regularly throughout recovery, especially for the first 6  weeks, regardless of the cold therapy method you use.      Always make sure there is a layer of protection between the cold pad and your skin.    If you are using ICE PACKS or GEL PACKS, you will need to alternate 20 minutes on, 20 minutes off twice per hour.    If you are using an ICE MACHINE, please follow the provided ice machine instructions.  These devices differ from ice or ice packs whereas the mechanism circulates water through tubing and a pad to provide longer periods of cold therapy to the desired site.  You can use your cold devices around the clock for optimal comfort.  We recommend using cold therapy after working with therapy or completing exercises on your own.  There is no set schedule in which you must follow while using cold therapy.  Below are a few points to remember when using a cold therapy device:    You do not need to need to use the 20 on, 20 off method.  Detach the pad from the cooler and ambulate at least once every hour.  You can check your skin under the pad at this time.  You may wear the cold therapy device during periods of sleep including overnight.  If you wake up during the night, you can check the skin at this time.  You do not need to wake up specifically to perform skin checks.  Empty the cooler and pad when device is not in use.  Follow 's instructions for cleaning your cold therapy device.    DISCHARGE MEDICATIONS - Please reference the sample schedule on the reverse side for instructions on how to best schedule medications.    PAIN MEDICATION    ___X_ Tramadol / Oxycodone  Tramadol and Oxycodone have been prescribed for post-operative pain control.    These medications will only be refilled ONCE every 7 days for a period of up to 6 weeks following surgery.  After 6 weeks, you will transition to acetaminophen and over -the- counter anti-inflammatories such as Ibuprofen, Advil or Aleve in conjunction with ICE/COLD THERAPY.   Side effects may be constipation and  nausea, vomiting, sleepiness, dizziness, lightheadedness, headache, blurred vision, dry mouth sweating, itching (if you have itching, over-the -counter Benadryl can be used as needed).  You may NOT operate a motor vehicle while taking these medications or have been cleared by your care team.     ___X_ Acetaminophen (Tylenol)  Acetaminophen has been prescribed as an adjunct for pain control. Take two 500 mg tablets every 6 hours for 4 weeks. You will not receive a refill on this medication.  Do not exceed 4000mg of acetaminophen within a 24 hour period.  Side effects may include nausea, heartburn, drowsiness, and headache.    ____ Meloxicam (Mobic)-Meloxicam has been prescribed as an adjunct anti-inflammatory to assist in pain control.    Take one 15mg tablet once daily for 4 weeks.  You will not receive refills on this medication.   Side effects may include nausea.  May not be prescribed if you are on a more potent blood thinner than aspirin or have chronic kidney disease.    BLOOD THINNER    ___X_ Blood Thinner   A blood thinner has been prescribed to prevent blood clots in your leg or lungs. Take as prescribed on the bottle for 4 weeks. You will not receive a refill on this medication.    ANTI NAUSEA    ___X_ Proton Pump Inhibitor (PPI)-Stomach Acid Reduction Medication  If you are already on a PPI, you will continue your regular medication. If you are not, you will be prescribed Pantoprazole to help with nausea and protect your stomach while taking pain medication.  You will not receive a refill on this medication.    STOOL SOFTENERS    ___X_ Colace (Docusate Sodium) & Miralax (polyethylene glycol)  Take both medications to help with constipation while using the Oxycodone and Tramadol for pain control.  You will not receive a refill on this medication.    Continued Constipation  If you continue to be constipated despite daily use of Miralax and Colace, you try an over-the-counter Dulcolax Suppository and use per  instructions on the package.       SPECIAL INSTRUCTIONS   Start eliqius 2.5 MG BID for 4 days, then restart home dose on day 5    You will not receive refills on the following medications.   Acetaminophen (Tylenol  Meloxicam  Miralax  Colace  Proton Pump Inhibitor (PPI)  Blood Thinner    Pain Medication Refills - Ortho Nurse Navigator or MyChart- Monday through Friday 7am-1pm    FOLLOW-UP- You should have an appointment with either Dr. Perrin or NIYAH Leon in 6 weeks.         SAMPLE              The times below are an example of how to organize medications to optimize pain control  Your actual medication schedule may vary based on your last dose taken IN THE HOSPITAL      Time 3:00 am 6:00 am 9:00 am 12:00 pm 3:00 pm 6:00 pm 9:00 pm 12:00 am   Medications Tramadol Tylenol  Oxycodone  Miralax   Blood Thinner  Colace  Pantoprazole or other PPI  Tramadol  Meloxicam Tylenol  Oxycodone Tramadol Tylenol  Oxycodone  Miralax Blood Thinner  Colace  Tramadol   Tylenol  Oxycodone            You may begin to wean off the pain medication as your pain remains controlled with increased activity.  The schedules provided are meant to serve as an example.  You may wean off based on your pain control.  Please note that pain medications are not filled beyond 6 weeks after surgery.              The times below are an example of how to WEAN OFF medications WHILE CONTINUING TO OPTIMIZE PAIN CONTROL.  Your actual medication schedule may vary based on your last dose taken.    Time 12:00am 4:00am 8:00am 12:00pm 4:00pm 8:00pm   Med Tramadol Oxycodone   Tramadol Oxycodone Tramadol Oxycodone     Time 12:00am 6:00am 12:00pm 6:00pm   Med Tramadol Oxycodone   Tramadol Oxycodone     Time 12:00am 8:00am 4:00pm   Med Tramadol Oxycodone   Tramadol     Time 12:00am 12:00pm   Med Tramadol Tramadol         TOTAL KNEE REPLACEMENT PATIENTS SHOULD TRANSITION TO OUTPATIENT PHYSICAL THERAPY NO MORE THAN 3 WEEKS FOLLOWING SURGERY.  PLEASE SEE THE  LIST OF  FACILITIES BELOW.  CALL TO SCHEDULE YOUR FIRST APPOINTMENT BEFORE YOU HAVE YOUR SURGERY.

## 2025-02-19 NOTE — LETTER
March 30, 2024    Patient: Betzaida Jc   YOB: 1970   Date of Visit: 3/30/2024       To Whom It May Concern:    Betzaida Jc was seen and treated in our emergency department on 3/30/2024. She may return to work on 04/02/2024 .    If you have any questions or concerns, please don't hesitate to call.              CC:   No Recipients   75M former smoker pmhx COPD (not on home oxygen), insomnia, ulcerative colitis, HTN, non-insulin dependent T2DM, GERD, Anemia, Gastroparesis admitted for SOB     Pneumonia  - Recent diagnosis of COVID19, completed 5D paxlovid outpatient by PCP, possible superimposed bacterial PNA   - BCx 2/16 NGTD X48 Hours   - ID: s/p azithromycin and CTX, transition to Cefuroxime 500mg BID   - elevated lactate __     COPD exacerbation   - not on any supplemental oxygen, c/w inhalers   - followed by pulmonary   - s/p IV solumedrol taper, to be discharged on prednisone 40mg x5d   - leukocytosis likely in setting of steroids     Hyponatremia, improved   - per nephrology possibly SIADH   - c/w 1.2L fluid restriction     HTN   - home telmisartan held, blood pressures stable off BP med    STARS Patient - GOC done, full code.   Patient is medically optimized for discharge home. Case/labs/medications discussed with Dr. Montanez on 2/19/25. 75M former smoker pmhx COPD (not on home oxygen), insomnia, ulcerative colitis, HTN, non-insulin dependent T2DM, GERD, Anemia, Gastroparesis admitted for SOB     Pneumonia  - Recent diagnosis of COVID19, completed 5D paxlovid outpatient by PCP, possible superimposed bacterial PNA   - BCx 2/16 NGTD X48 Hours   - ID: s/p azithromycin and CTX, transition to Cefuroxime 500mg BID   - elevated lactate __     COPD exacerbation   - not on any supplemental oxygen, c/w inhalers   - followed by pulmonary   - s/p IV solumedrol taper, to be discharged on prednisone 40mg x5d   - leukocytosis likely in setting of steroids     Hyponatremia, improved   - per nephrology possibly SIADH vs. dehydration  - c/w 1.5L fluid restriction on discharge     HTN   - home telmisartan held, blood pressures stable off BP med    STARS Patient - GOC done, full code.   Patient is medically optimized for discharge home. Case/labs/medications discussed with Dr. Montanez on 2/19/25. 75M former smoker pmhx COPD (not on home oxygen), insomnia, ulcerative colitis, HTN, non-insulin dependent T2DM, GERD, Anemia, Gastroparesis admitted for SOB     Pneumonia  - Recent diagnosis of COVID19, completed 5D paxlovid outpatient by PCP, possible superimposed bacterial PNA   - BCx 2/16 NGTD X48 Hours   - ID: s/p azithromycin and CTX   - elevated lactate __     COPD exacerbation   - not on any supplemental oxygen, c/w inhalers   - followed by pulmonary   - s/p IV solumedrol taper, to be discharged on prednisone 40mg x5d   - leukocytosis likely in setting of steroids     Hyponatremia, improved   - per nephrology possibly SIADH vs. dehydration  - c/w 1.5L fluid restriction on discharge     HTN   - home telmisartan held, blood pressures stable off BP med    STARS Patient - GOC done, full code. 75M former smoker pmhx COPD (not on home oxygen), insomnia, ulcerative colitis, HTN, non-insulin dependent T2DM, GERD, Anemia, Gastroparesis admitted for SOB     Pneumonia  - Recent diagnosis of COVID19, completed 5D paxlovid outpatient by PCP, possible superimposed bacterial PNA   - BCx 2/16 NGTD X48 Hours   - ID: s/p azithromycin and CTX   - elevated lactate likely in s/o covid-19 per nephrology, now improved from 3.8 to 2.7.  - op pcp and pulm f/u     COPD exacerbation   - not on any supplemental oxygen, c/w inhalers   - followed by pulmonary   - s/p IV solumedrol taper, to be discharged on prednisone 40mg x5d   - leukocytosis likely in setting of steroids   - 5 mm left lower lobe nodule slightly increased in size since September 17, 2024. can follow as an otpt  - home pulm appt made    Hyponatremia, improved   - per nephrology possibly SIADH vs. dehydration  - c/w 1.5L fluid restriction on discharge     HTN   - home telmisartan held, blood pressures stable off BP med    STARS Patient - GOC done, full code.      Patient seen and evaluated, no acute somatic complaints. Reviewed discharge medications with patient; All new medications requiring new prescriptions were sent to the pharmacy of patient's choice. Reviewed need for prescription for previous home medications and new prescriptions sent if requested. Medically cleared/stable for discharge as per Dr. Montanez on 2/20/2025 with close outpatient follow up within 1-2 weeks of discharge. Patient understands and agrees with plan of care. 75M former smoker pmhx COPD (not on home oxygen), insomnia, ulcerative colitis, HTN, non-insulin dependent T2DM, GERD, Anemia, Gastroparesis admitted for SOB     Pneumonia  - Recent diagnosis of COVID19, completed 5D paxlovid outpatient by PCP, possible superimposed bacterial PNA   - BCx 2/16 NGTD X48 Hours   - ID: s/p azithromycin and CTX   - elevated lactate likely in s/o covid-19 per nephrology, now improved from 3.8 to 2.8.  - janumet to be held on dc d/t elevated lactate. Per endocrine curbside, recommend to replace with januvia 50 mg daily instead  - op pcp and pulm f/u     COPD exacerbation   - not on any supplemental oxygen, c/w inhalers   - followed by pulmonary   - s/p IV solumedrol taper, to be discharged on prednisone 40mg x5d   - leukocytosis likely in setting of steroids   - 5 mm left lower lobe nodule slightly increased in size since September 17, 2024. can follow as an output  - home pulm appt made    Hyponatremia, improved   - per nephrology possibly SIADH vs. dehydration  - c/w 1.5L fluid restriction on discharge     HTN   - home telmisartan held, blood pressures stable off BP med    Outside.in Patient - GOC done, full code.      Patient seen and evaluated, no acute somatic complaints. Reviewed discharge medications with patient; All new medications requiring new prescriptions were sent to the pharmacy of patient's choice. Reviewed need for prescription for previous home medications and new prescriptions sent if requested. Medically cleared/stable for discharge as per Dr. Montanez on 2/20/2025 with close outpatient follow up within 1-2 weeks of discharge. Patient understands and agrees with plan of care.

## 2025-02-20 ENCOUNTER — DOCUMENTATION (OUTPATIENT)
Dept: HOME HEALTH SERVICES | Facility: HOME HEALTH | Age: 55
End: 2025-02-20
Payer: COMMERCIAL

## 2025-02-20 VITALS
OXYGEN SATURATION: 99 % | HEIGHT: 66 IN | HEART RATE: 80 BPM | WEIGHT: 230.16 LBS | TEMPERATURE: 97.9 F | SYSTOLIC BLOOD PRESSURE: 130 MMHG | BODY MASS INDEX: 36.99 KG/M2 | DIASTOLIC BLOOD PRESSURE: 82 MMHG | RESPIRATION RATE: 18 BRPM

## 2025-02-20 PROBLEM — M17.12 UNILATERAL PRIMARY OSTEOARTHRITIS, LEFT KNEE: Status: RESOLVED | Noted: 2025-01-20 | Resolved: 2025-02-20

## 2025-02-20 PROBLEM — Z96.652 TOTAL KNEE REPLACEMENT STATUS, LEFT: Status: RESOLVED | Noted: 2025-02-18 | Resolved: 2025-02-20

## 2025-02-20 LAB
ANION GAP SERPL CALC-SCNC: 13 MMOL/L (ref 10–20)
BUN SERPL-MCNC: 19 MG/DL (ref 6–23)
CALCIUM SERPL-MCNC: 8.5 MG/DL (ref 8.6–10.3)
CHLORIDE SERPL-SCNC: 104 MMOL/L (ref 98–107)
CO2 SERPL-SCNC: 23 MMOL/L (ref 21–32)
CREAT SERPL-MCNC: 0.77 MG/DL (ref 0.5–1.05)
EGFRCR SERPLBLD CKD-EPI 2021: >90 ML/MIN/1.73M*2
ERYTHROCYTE [DISTWIDTH] IN BLOOD BY AUTOMATED COUNT: 14.2 % (ref 11.5–14.5)
GLUCOSE BLD MANUAL STRIP-MCNC: 100 MG/DL (ref 74–99)
GLUCOSE SERPL-MCNC: 106 MG/DL (ref 74–99)
HCT VFR BLD AUTO: 28.3 % (ref 36–46)
HGB BLD-MCNC: 8.9 G/DL (ref 12–16)
MCH RBC QN AUTO: 27.6 PG (ref 26–34)
MCHC RBC AUTO-ENTMCNC: 31.4 G/DL (ref 32–36)
MCV RBC AUTO: 88 FL (ref 80–100)
NRBC BLD-RTO: ABNORMAL /100{WBCS}
PLATELET # BLD AUTO: 256 X10*3/UL (ref 150–450)
POTASSIUM SERPL-SCNC: 4.3 MMOL/L (ref 3.5–5.3)
RBC # BLD AUTO: 3.22 X10*6/UL (ref 4–5.2)
SODIUM SERPL-SCNC: 136 MMOL/L (ref 136–145)
WBC # BLD AUTO: 7.7 X10*3/UL (ref 4.4–11.3)

## 2025-02-20 PROCEDURE — 85027 COMPLETE CBC AUTOMATED: CPT

## 2025-02-20 PROCEDURE — 7100000011 HC EXTENDED STAY RECOVERY HOURLY - NURSING UNIT

## 2025-02-20 PROCEDURE — 97530 THERAPEUTIC ACTIVITIES: CPT | Mod: GP

## 2025-02-20 PROCEDURE — 99221 1ST HOSP IP/OBS SF/LOW 40: CPT | Performed by: EMERGENCY MEDICINE

## 2025-02-20 PROCEDURE — 2500000001 HC RX 250 WO HCPCS SELF ADMINISTERED DRUGS (ALT 637 FOR MEDICARE OP)

## 2025-02-20 PROCEDURE — 2500000004 HC RX 250 GENERAL PHARMACY W/ HCPCS (ALT 636 FOR OP/ED)

## 2025-02-20 PROCEDURE — 2500000001 HC RX 250 WO HCPCS SELF ADMINISTERED DRUGS (ALT 637 FOR MEDICARE OP): Performed by: EMERGENCY MEDICINE

## 2025-02-20 PROCEDURE — 82947 ASSAY GLUCOSE BLOOD QUANT: CPT

## 2025-02-20 PROCEDURE — 36415 COLL VENOUS BLD VENIPUNCTURE: CPT

## 2025-02-20 PROCEDURE — 97116 GAIT TRAINING THERAPY: CPT | Mod: GP

## 2025-02-20 PROCEDURE — 80048 BASIC METABOLIC PNL TOTAL CA: CPT

## 2025-02-20 PROCEDURE — 97110 THERAPEUTIC EXERCISES: CPT | Mod: GP

## 2025-02-20 PROCEDURE — 2500000002 HC RX 250 W HCPCS SELF ADMINISTERED DRUGS (ALT 637 FOR MEDICARE OP, ALT 636 FOR OP/ED): Performed by: EMERGENCY MEDICINE

## 2025-02-20 RX ADMIN — METFORMIN HYDROCHLORIDE 500 MG: 500 TABLET ORAL at 08:16

## 2025-02-20 RX ADMIN — LISINOPRIL 40 MG: 20 TABLET ORAL at 10:34

## 2025-02-20 RX ADMIN — FLECAINIDE ACETATE 100 MG: 50 TABLET ORAL at 10:33

## 2025-02-20 RX ADMIN — ACETAMINOPHEN 650 MG: 325 TABLET ORAL at 01:59

## 2025-02-20 RX ADMIN — APIXABAN 2.5 MG: 2.5 TABLET, FILM COATED ORAL at 10:28

## 2025-02-20 RX ADMIN — CYCLOBENZAPRINE HYDROCHLORIDE 5 MG: 10 TABLET, FILM COATED ORAL at 12:04

## 2025-02-20 RX ADMIN — KETOROLAC TROMETHAMINE 15 MG: 15 INJECTION, SOLUTION INTRAMUSCULAR; INTRAVENOUS at 01:58

## 2025-02-20 RX ADMIN — PANTOPRAZOLE SODIUM 40 MG: 40 TABLET, DELAYED RELEASE ORAL at 06:54

## 2025-02-20 RX ADMIN — MONTELUKAST 10 MG: 10 TABLET, FILM COATED ORAL at 10:34

## 2025-02-20 RX ADMIN — KETOROLAC TROMETHAMINE 15 MG: 15 INJECTION, SOLUTION INTRAMUSCULAR; INTRAVENOUS at 08:16

## 2025-02-20 RX ADMIN — ACETAMINOPHEN 650 MG: 325 TABLET ORAL at 08:16

## 2025-02-20 RX ADMIN — CYANOCOBALAMIN TAB 500 MCG 1000 MCG: 500 TAB at 10:31

## 2025-02-20 RX ADMIN — FOLIC ACID 1 MG: 1 TABLET ORAL at 10:33

## 2025-02-20 RX ADMIN — METOPROLOL TARTRATE 100 MG: 50 TABLET, FILM COATED ORAL at 10:38

## 2025-02-20 RX ADMIN — DOCUSATE SODIUM 100 MG: 100 CAPSULE, LIQUID FILLED ORAL at 10:32

## 2025-02-20 RX ADMIN — ATORVASTATIN CALCIUM 40 MG: 40 TABLET, FILM COATED ORAL at 10:29

## 2025-02-20 RX ADMIN — TORSEMIDE 20 MG: 20 TABLET ORAL at 10:35

## 2025-02-20 RX ADMIN — POLYETHYLENE GLYCOL 3350 17 G: 17 POWDER, FOR SOLUTION ORAL at 10:35

## 2025-02-20 RX ADMIN — CETIRIZINE HYDROCHLORIDE 10 MG: 10 TABLET, FILM COATED ORAL at 10:31

## 2025-02-20 RX ADMIN — OXYCODONE HYDROCHLORIDE 10 MG: 5 TABLET ORAL at 10:37

## 2025-02-20 SDOH — SOCIAL STABILITY: SOCIAL INSECURITY: DO YOU FEEL UNSAFE GOING BACK TO THE PLACE WHERE YOU ARE LIVING?: NO

## 2025-02-20 SDOH — SOCIAL STABILITY: SOCIAL INSECURITY: HAVE YOU HAD THOUGHTS OF HARMING ANYONE ELSE?: NO

## 2025-02-20 SDOH — SOCIAL STABILITY: SOCIAL INSECURITY: WERE YOU ABLE TO COMPLETE ALL THE BEHAVIORAL HEALTH SCREENINGS?: YES

## 2025-02-20 SDOH — ECONOMIC STABILITY: HOUSING INSECURITY: DO YOU FEEL UNSAFE GOING BACK TO THE PLACE WHERE YOU LIVE?: NO

## 2025-02-20 SDOH — SOCIAL STABILITY: SOCIAL INSECURITY: ARE YOU OR HAVE YOU BEEN THREATENED OR ABUSED PHYSICALLY, EMOTIONALLY, OR SEXUALLY BY ANYONE?: NO

## 2025-02-20 SDOH — SOCIAL STABILITY: SOCIAL INSECURITY: HAVE YOU HAD ANY THOUGHTS OF HARMING ANYONE ELSE?: NO

## 2025-02-20 SDOH — SOCIAL STABILITY: SOCIAL INSECURITY: DOES ANYONE TRY TO KEEP YOU FROM HAVING/CONTACTING OTHER FRIENDS OR DOING THINGS OUTSIDE YOUR HOME?: NO

## 2025-02-20 SDOH — SOCIAL STABILITY: SOCIAL INSECURITY: ABUSE: ADULT

## 2025-02-20 SDOH — SOCIAL STABILITY: SOCIAL INSECURITY: HAS ANYONE EVER THREATENED TO HURT YOUR FAMILY OR YOUR PETS?: NO

## 2025-02-20 SDOH — SOCIAL STABILITY: SOCIAL INSECURITY: ARE THERE ANY APPARENT SIGNS OF INJURIES/BEHAVIORS THAT COULD BE RELATED TO ABUSE/NEGLECT?: NO

## 2025-02-20 SDOH — SOCIAL STABILITY: SOCIAL INSECURITY: DO YOU FEEL ANYONE HAS EXPLOITED OR TAKEN ADVANTAGE OF YOU FINANCIALLY OR OF YOUR PERSONAL PROPERTY?: NO

## 2025-02-20 ASSESSMENT — COGNITIVE AND FUNCTIONAL STATUS - GENERAL
MOBILITY SCORE: 19
DAILY ACTIVITIY SCORE: 19
PERSONAL GROOMING: A LITTLE
DRESSING REGULAR UPPER BODY CLOTHING: A LITTLE
CLIMB 3 TO 5 STEPS WITH RAILING: A LITTLE
DRESSING REGULAR UPPER BODY CLOTHING: A LITTLE
MOVING TO AND FROM BED TO CHAIR: A LITTLE
MOVING TO AND FROM BED TO CHAIR: A LITTLE
HELP NEEDED FOR BATHING: A LITTLE
WALKING IN HOSPITAL ROOM: A LITTLE
PERSONAL GROOMING: A LITTLE
MOBILITY SCORE: 18
MOVING FROM LYING ON BACK TO SITTING ON SIDE OF FLAT BED WITH BEDRAILS: A LITTLE
MOBILITY SCORE: 22
CLIMB 3 TO 5 STEPS WITH RAILING: A LITTLE
DRESSING REGULAR LOWER BODY CLOTHING: A LITTLE
DAILY ACTIVITIY SCORE: 19
STANDING UP FROM CHAIR USING ARMS: A LITTLE
TOILETING: A LITTLE
HELP NEEDED FOR BATHING: A LITTLE
CLIMB 3 TO 5 STEPS WITH RAILING: A LOT
PATIENT BASELINE BEDBOUND: NO
WALKING IN HOSPITAL ROOM: A LITTLE
DRESSING REGULAR LOWER BODY CLOTHING: A LITTLE
TURNING FROM BACK TO SIDE WHILE IN FLAT BAD: A LITTLE
TURNING FROM BACK TO SIDE WHILE IN FLAT BAD: A LITTLE
TOILETING: A LITTLE
WALKING IN HOSPITAL ROOM: A LITTLE

## 2025-02-20 ASSESSMENT — PAIN SCALES - GENERAL
PAINLEVEL_OUTOF10: 2
PAINLEVEL_OUTOF10: 7
PAINLEVEL_OUTOF10: 5 - MODERATE PAIN
PAINLEVEL_OUTOF10: 3
PAINLEVEL_OUTOF10: 3
PAINLEVEL_OUTOF10: 4

## 2025-02-20 ASSESSMENT — LIFESTYLE VARIABLES
HOW OFTEN DO YOU HAVE A DRINK CONTAINING ALCOHOL: NEVER
AUDIT-C TOTAL SCORE: 0
SKIP TO QUESTIONS 9-10: 1
HOW MANY STANDARD DRINKS CONTAINING ALCOHOL DO YOU HAVE ON A TYPICAL DAY: PATIENT DOES NOT DRINK
HOW OFTEN DO YOU HAVE 6 OR MORE DRINKS ON ONE OCCASION: NEVER
AUDIT-C TOTAL SCORE: 0

## 2025-02-20 ASSESSMENT — ACTIVITIES OF DAILY LIVING (ADL)
ASSISTIVE_DEVICE: WALKER
WALKS IN HOME: INDEPENDENT
DRESSING YOURSELF: INDEPENDENT
JUDGMENT_ADEQUATE_SAFELY_COMPLETE_DAILY_ACTIVITIES: YES
FEEDING YOURSELF: INDEPENDENT
HEARING - LEFT EAR: FUNCTIONAL
HEARING - RIGHT EAR: FUNCTIONAL
BATHING: INDEPENDENT
PATIENT'S MEMORY ADEQUATE TO SAFELY COMPLETE DAILY ACTIVITIES?: YES
TOILETING: INDEPENDENT
ADEQUATE_TO_COMPLETE_ADL: YES
GROOMING: INDEPENDENT

## 2025-02-20 ASSESSMENT — PATIENT HEALTH QUESTIONNAIRE - PHQ9
SUM OF ALL RESPONSES TO PHQ9 QUESTIONS 1 & 2: 0
1. LITTLE INTEREST OR PLEASURE IN DOING THINGS: NOT AT ALL
2. FEELING DOWN, DEPRESSED OR HOPELESS: NOT AT ALL

## 2025-02-20 ASSESSMENT — PAIN DESCRIPTION - DESCRIPTORS
DESCRIPTORS: ACHING

## 2025-02-20 ASSESSMENT — PAIN - FUNCTIONAL ASSESSMENT
PAIN_FUNCTIONAL_ASSESSMENT: 0-10

## 2025-02-20 NOTE — NURSING NOTE
Assumed care of patient.  Meds and labs reviewed.  Patient was awake in bed during bedside report. Pain is manageable. Will received meds at 8a to aide with anticipation of therapy. Patient was being escorted to restroom by staff. Reviewed the plan of care with patient. Breakfast ordered.

## 2025-02-20 NOTE — PROGRESS NOTES
Interdisciplinary Rounds were completed at the bedside with Patient.  Staff participating in rounds included: Clinical Nurse Orthopedic Coordinator Transitional Care Coordinator Nursing Leadership Hospitalist MD/PA.  Topics discussed included: Today's Plan of Care Discharge Plan and Accommodations Physical Therapy Medications/Preferred Pharmacy and the Patient was given the opportunity to ask additional questions or bring up any concerns at that time.  During the final discharge discussion and review of instructions, they will have another opportunity to review questions or concerns prior to leaving our care.  Patient was given information on who to call post-discharge should new questions or concerns arise.      At the time of this discussion, the patient's plan includes:    Discharge Date/Disposition:  Home, Today with, Home Care Services  Discharge Needs: No Equipment Needs Identified  Medications/Pharmacy: [id number] service utilized for discharge prescriptions through [facility]

## 2025-02-20 NOTE — PROGRESS NOTES
Medication Education     Medication education for Betzaida Jc was provided to the patient  for the following medication(s):  Docusate  Eliquis  Oxycodone  Tylenol  Pantoprazole  Miralax  Tramadol    Medication education provided by a Pharmacist:  -Proper dose, indication, possible ADRs   -How the medication works and benefits of taking it  -Importance of compliance   -Potential duration of therapy    Identified potential barriers to education:  None    Method(s) of Education:  Verbal Written materials provided and reviewed    An opportunity to ask questions and receive answers was provided.     Assessment of understanding the patient :  2= meets goals/outcomes    Additional Notes (if applicable): Meds to beds delivered to patient.    Pam Meade, PharmD

## 2025-02-20 NOTE — CARE PLAN
The patient's goals for the shift include  pain control.    The clinical goals for the shift include pain management and improved ambulation.      Problem: Pain - Adult  Goal: Verbalizes/displays adequate comfort level or baseline comfort level  Outcome: Progressing     Problem: Safety - Adult  Goal: Free from fall injury  Outcome: Progressing     Problem: Diabetes  Goal: Maintain glucose levels >70mg/dl to <250mg/dl throughout shift  Outcome: Progressing     Problem: Pain  Goal: Walks with improved pain control throughout the shift  Outcome: Progressing     Problem: Fall/Injury  Goal: Verbalize understanding of personal risk factors for fall in the hospital  Reactivated

## 2025-02-20 NOTE — PROGRESS NOTES
Physical Therapy Treatment    Patient Name: Betzaida Jc  MRN: [id number]  Department: [facility]  Room:   Today's Date: 2025  Time Calculation  Start Time: 0948  Stop Time: 1038  Time Calculation (min): 50 min         Assessment/Plan   PT Assessment  PT Assessment Results: Decreased strength, Decreased range of motion, Decreased endurance, Impaired balance, Orthopedic restrictions, Pain, Obesity  Rehab Prognosis: Good  Barriers to Discharge Home: No anticipated barriers  Evaluation/Treatment Tolerance: Patient tolerated treatment well  End of Session Communication: Bedside nurse  Assessment Comment: Pt able to ambulate community distance with RW and able complete stairs in preparation for home going. Pt to have HHPT and assist from her sister and her son.  End of Session Patient Position: Up in chair, Alarm on, BLE elevated L knee fully extended, ice to surgical site, call light in reach, needs met, RN aware.  PT Plan  Inpatient/Swing Bed or Outpatient: Inpatient  PT Plan  Treatment/Interventions: Bed mobility, Transfer training, Gait training, Stair training, Balance training, Strengthening, Endurance training, Range of motion, Therapeutic activity, Therapeutic exercise, Home exercise program, Positioning, Postural re-education  PT Plan: Ongoing PT  PT Frequency: BID  PT Discharge Recommendations: Low intensity level of continued care  Equipment Recommended upon Discharge: Wheeled walker  PT Recommended Transfer Status: Stand by assist, Assistive device  PT - OK to Discharge: Yes      General Visit Information:   PT  Visit  PT Received On: 25  Response to Previous Treatment: Patient with no complaints from previous session.  General  Reason for Referral: L TKR  Referred By: Dr. Perrin  Past Medical History Relevant to Rehab: OA, HTN, DVT, PE, HLD, DM, asthma, GERD, kidney stones, BRIDGER, hiatal hernia, migraine, Afib, L achilles surgery, , hysterectomy, TIA, anemia, IBS,  PUD  Family/Caregiver Present: No  Prior to Session Communication: Bedside nurse  Patient Position Received: Bed, 3 rail up, Alarm on  General Comment: RN removed hemovac to L knee    Subjective   Precautions:  Precautions  LE Weight Bearing Status: Weight Bearing as Tolerated  Medical Precautions: Fall precautions  Post-Surgical Precautions: Left total knee precautions      Objective   Pain:  Pain Assessment  Pain Assessment: 0-10  0-10 (Numeric) Pain Score: 4  Pain Type: Surgical pain  Pain Location: Knee  Pain Orientation: Left  Pain Interventions: Cold applied, Repositioned    Activity Tolerance:  Activity Tolerance  Endurance: Endurance does not limit participation in activity  Treatments:  Therapeutic Exercise  Therapeutic Exercise Performed: Yes    Ambulation/Gait Training  Ambulation/Gait Training Performed: Yes  Ambulation/Gait Training 1  Surface 1: Level tile  Device 1: Rolling walker  Assistance 1: Distant supervision, Minimal verbal cues (cues for sequencing)  Quality of Gait 1: Antalgic (decreased kendall, step to pattern, no LOB noted.)  Comments/Distance (ft) 1: 150 feet x 2    Transfers  Transfer: Yes  Transfer 1  Transfer From 1: Bed to, Chair with arms to  Transfer to 1: Chair with arms  Technique 1: Sit to stand, Stand to sit  Transfer Device 1: Walker  Transfer Level of Assistance 1: Distant supervision, Minimal verbal cues (cues for hand placement)    Stairs  Stairs: Yes  Stairs  Rails 1: Left  Device 1: Railing, Single point cane  Assistance 1: Close supervision, Minimal verbal cues (cues for sequencing)  Comment/Number of Steps 1: 3  Pt demonstrated step to pattern, decreased kendall, no LOB noted.     Outcome Measures:  [facility] Basic Mobility  Turning from your back to your side while in a flat bed without using bedrails: None  Moving from lying on your back to sitting on the side of a flat bed without using bedrails: None  Moving to and from bed to chair (including a wheelchair):  None  Standing up from a chair using your arms (e.g. wheelchair or bedside chair): None  To walk in hospital room: A little  Climbing 3-5 steps with railing: A little  Basic Mobility - Total Score: 22    Encounter Problems       Encounter Problems (Active)       Balance       LTG - Patient will demonstrate Intervention to enhance balance for safe completion of daily activities (Progressing)       Start:  02/19/25            LTG - Patient will maintain balance to allow for safe mobility (Progressing)       Start:  02/19/25               Compromised Skin Integrity       LTG - Patient will be free from infection (Progressing)       Start:  02/19/25               Mobility       LTG - Patient will ambulate community distance with RW at a modified independent level.   (Progressing)       Start:  02/19/25            LTG - Patient will navigate 4-6 steps with rails/device with cane at a modified independent level.   (Progressing)       Start:  02/19/25               PT Transfers       LTG - Patient will demonstrate safe transfer techniques with RW at a modified independent level.   (Progressing)       Start:  02/19/25               Safety       LTG - Patient will demonstrate safety requirements appropriate to situation/environment (Progressing)       Start:  02/19/25                 Aida Echeverria, PT, DPT

## 2025-02-20 NOTE — NURSING NOTE
Patient is being discharge home transported by family. Reviewed discharge medications and instructions with patient. She acknowledged understanding. Patient was escorted to family car via WC by nursing staff.

## 2025-02-20 NOTE — NURSING NOTE
Nurse to nurse report received from UMBERTO Cartwright.  Assumed care of patient.  Patient in bed watching TV with knee straight.  Patient is alert and oriented x 4.  Patient rates left knee pain 3/10 and states that she is comfortable. Pedal pulses 2, movement/sensation intact.  SCDs on.  Ice pack in place.  Hemovac secure and draining small amount of serosanguinous drainage by gravity. Whiteboard updated and goals discussed for shift.  Call light and possessions within reach.  Bed alarm on. Patient denies any needs at this time.

## 2025-02-20 NOTE — PROGRESS NOTES
ANDRES Baig, PAChloeC, ATC  Orthopedic Physician Assisant  Adult Reconstruction and Total Joint Replacement  General Orthopedics  Department of Orthopaedic Surgery  [place] [id number]      KAILYN FrankC

## 2025-02-20 NOTE — PROGRESS NOTES
Patient sleeping at the time of rounds, chart reviewed.  Vital signs are stable.  Nursing notes no issues.              Blanca Hernandez MD

## 2025-02-20 NOTE — CONSULTS
Consults    Reason For Consult  Medical management for history of asthma chest pain hyperlipidemia hypertension and postop pain    History Of Present Illness  Betzaida Jc is a 55 y.o. female presenting with left total knee replacement she has done well postoperatively without complications and is medically stable for discharge to home.     Past Medical History  She has a past medical history of Asthma, Chest pain, Chronic allergic rhinitis, Chronic constipation, Collapse of right lung, Dependence on nocturnal oxygen therapy, Diverticulosis, Hiatal hernia with GERD, History of venous thromboembolism (2010), Hyperlipidemia, Hypertension, Irregular heart beat, Migraine, Morbid obesity with BMI of 45.0-49.9, adult (Multi), BRIDGER (obstructive sleep apnea), Paroxysmal atrial fibrillation (Multi), Right renal stone, and Type 2 diabetes mellitus.    Surgical History  She has a past surgical history that includes Knee arthroscopy w/ debridement (Right); MR angio head wo IV contrast (2019); MR angio neck wo IV contrast (2019); CT angio coronary art with heartflow if score >30% (2022); Shoulder arthroscopy w/ rotator cuff repair (Left, 2020); Esophagoscopy / EGD (2023); Colonoscopy; Toe Surgery (Right, 2023); Partial hysterectomy (2006); BI breast right core needle biopsy; Achilles tendon surgery (Left, 2010); and  section, classic.     Social History  She reports that she has never smoked. She has never used smokeless tobacco. She reports current alcohol use of about 2.0 standard drinks of alcohol per week. She reports that she does not use drugs.    Family History  Family History   Problem Relation Name Age of Onset    Thyroid cancer Mother      Kidney cancer Father      Diabetes Other      Hypertension Other      Stroke Other          Allergies  Penicillins and Sulfa (sulfonamide antibiotics)    Review of Systems  10 system review noncontributory  Physical  Exam  Patient is alert in no acute distress  Lungs are clear to auscultation and percussion  Cardiac is regular rate and rhythm normal S1-S2 without murmur gallop rub click S3 or S4  Abdomen is soft nontender positive bowel sounds there is no hepatosplenomegaly or CVA tenderness appreciated  Extremities without cyanosis clubbing erythema or edema  Operative site exam per Ortho extremities warm pulses are intact patient can dorsiflex plantarflex  Last Recorded Vitals  /81 (BP Location: Left arm, Patient Position: Lying)   Pulse 69   Temp 37.1 °C (98.8 °F) (Temporal)   Resp 18   Wt 104 kg (230 lb 2.6 oz)   SpO2 100%     Relevant Results  Scheduled medications  acetaminophen, 650 mg, oral, q6h JORGE  apixaban, 2.5 mg, oral, q12h JORGE  atorvastatin, 40 mg, oral, Daily  cetirizine, 10 mg, oral, Daily  cyanocobalamin, 1,000 mcg, oral, Daily  docusate sodium, 100 mg, oral, BID  ergocalciferol, 1,250 mcg, oral, q7 days  flecainide, 100 mg, oral, BID  fluticasone furoate-vilanteroL, 1 puff, inhalation, Daily  folic acid, 1 mg, oral, Daily  linaCLOtide, 145 mcg, oral, Daily before breakfast  lisinopril, 40 mg, oral, Daily  metFORMIN, 500 mg, oral, BID  metoprolol tartrate, 100 mg, oral, BID  montelukast, 10 mg, oral, Daily  pantoprazole, 40 mg, oral, Daily before breakfast  polyethylene glycol, 17 g, oral, Daily  sodium chloride, 2 spray, Each Nostril, Daily  tirzepatide, 7.5 mg, subcutaneous, q7 days  torsemide, 20 mg, oral, Daily      Continuous medications  lactated Ringer's, 50 mL/hr, Last Rate: Stopped (02/19/25 7445)      PRN medications  PRN medications: albuterol, benzocaine-menthol, bisacodyl, bisacodyl, cyclobenzaprine, HYDROmorphone, meclizine, melatonin, metoclopramide **OR** metoclopramide, naloxone, [DISCONTINUED] ondansetron ODT **OR** ondansetron, ondansetron ODT, oxyCODONE, oxyCODONE, oxygen, oxygen, simethicone, sodium chloride    Results for orders placed or performed during the hospital encounter  of 02/19/25 (from the past 24 hours)   POCT GLUCOSE   Result Value Ref Range    POCT Glucose 119 (H) 74 - 99 mg/dL   POCT GLUCOSE   Result Value Ref Range    POCT Glucose 133 (H) 74 - 99 mg/dL   CBC   Result Value Ref Range    WBC 7.7 4.4 - 11.3 x10*3/uL    nRBC      RBC 3.22 (L) 4.00 - 5.20 x10*6/uL    Hemoglobin 8.9 (L) 12.0 - 16.0 g/dL    Hematocrit 28.3 (L) 36.0 - 46.0 %    MCV 88 80 - 100 fL    MCH 27.6 26.0 - 34.0 pg    MCHC 31.4 (L) 32.0 - 36.0 g/dL    RDW 14.2 11.5 - 14.5 %    Platelets 256 150 - 450 x10*3/uL   Basic metabolic panel   Result Value Ref Range    Glucose 106 (H) 74 - 99 mg/dL    Sodium 136 136 - 145 mmol/L    Potassium 4.3 3.5 - 5.3 mmol/L    Chloride 104 98 - 107 mmol/L    Bicarbonate 23 21 - 32 mmol/L    Anion Gap 13 10 - 20 mmol/L    Urea Nitrogen 19 6 - 23 mg/dL    Creatinine 0.77 0.50 - 1.05 mg/dL    eGFR >90 >60 mL/min/1.73m*2    Calcium 8.5 (L) 8.6 - 10.3 mg/dL   POCT GLUCOSE   Result Value Ref Range    POCT Glucose 100 (H) 74 - 99 mg/dL     *Note: Due to a large number of results and/or encounters for the requested time period, some results have not been displayed. A complete set of results can be found in Results Review.          Assessment/Plan   Status post left total knee replacement  - Management per Ortho  - PT  - Pain control  - Supportive care     History of paroxysmal A-fib  - Currently asymptomatic  - Will continue with current medications     Hypertension  - Continue current medications  - Clinically stable     History of DVT/PE  - Felt secondary to Achilles heel surgery  - Will continue Eliquis which she is on for her A-fib     DVT prophylaxis  - Eliquis  - SCDs  - Ambulate     Asthma  - Continue medications  - Consult respiratory therapy     Patient is medically stable for discharge to home    Fern Lima MD

## 2025-02-20 NOTE — HH CARE COORDINATION
Home Care received a Referral for Physical Therapy. We have processed the referral for a Start of Care on 2/21/25.     If you have any questions or concerns, please feel free to contact us at [phone]. Follow the prompts, enter your five digit zip code, and you will be directed to your care team on [facility].

## 2025-02-20 NOTE — CARE PLAN
Patient  requesting medication refill.  Please approve or deny this request.    Rx requested:  Requested Prescriptions     Pending Prescriptions Disp Refills    omeprazole (PRILOSEC) 40 MG delayed release capsule 30 capsule 11     Sig: Take 1 capsule by mouth daily    naproxen (NAPROSYN) 500 MG tablet 60 tablet 5     Sig: Take 1 tablet by mouth 2 times daily as needed for Pain    triamterene-hydroCHLOROthiazide (DYAZIDE) 37.5-25 MG per capsule 30 capsule 11     Sig: Take 1 capsule by mouth every morning    aspirin 81 MG chewable tablet 30 tablet 3     Sig: Take 1 tablet by mouth daily    lisinopril (PRINIVIL;ZESTRIL) 2.5 MG tablet 30 tablet 11     Sig: TAKE 1 TABLET BY MOUTH DAILY    atorvastatin (LIPITOR) 10 MG tablet 30 tablet 11     Sig: TAKE 1 TABLET BY MOUTH DAILY         Last Office Visit:   1/13/2020      Next Visit Date:  Future Appointments   Date Time Provider Petra De Paz   7/10/2020  3:30 PM Alexa Yin, [address] The patient's goals for the shift include pain control    The clinical goals for the shift include pain control and safety

## 2025-02-20 NOTE — CARE PLAN
TCC met with patient at the bedside to discuss care/discharge plan.  PT POD#1 left knee replacement.  Plan is home today with [facility] PT.  SOC confirmed on 2/21/25.  Family to transport patient home and assist with care as needed.

## 2025-02-21 ENCOUNTER — HOME CARE VISIT (OUTPATIENT)
Dept: HOME HEALTH SERVICES | Facility: HOME HEALTH | Age: 55
End: 2025-02-21
Payer: COMMERCIAL

## 2025-02-21 VITALS
WEIGHT: 227 LBS | HEART RATE: 78 BPM | HEIGHT: 67 IN | TEMPERATURE: 98.6 F | RESPIRATION RATE: 14 BRPM | SYSTOLIC BLOOD PRESSURE: 118 MMHG | DIASTOLIC BLOOD PRESSURE: 70 MMHG | BODY MASS INDEX: 35.63 KG/M2

## 2025-02-21 PROCEDURE — G0151 HHCP-SERV OF PT,EA 15 MIN: HCPCS

## 2025-02-21 SDOH — HEALTH STABILITY: PHYSICAL HEALTH: EXERCISE TYPE: TKA

## 2025-02-21 SDOH — HEALTH STABILITY: PHYSICAL HEALTH: EXERCISE ACTIVITY: TKE, KNEE FLEX, SAQ, SLR, HS, QS

## 2025-02-21 SDOH — HEALTH STABILITY: PHYSICAL HEALTH: PHYSICAL EXERCISE: SIT, SUPINE

## 2025-02-21 SDOH — HEALTH STABILITY: PHYSICAL HEALTH: PHYSICAL EXERCISE: 15

## 2025-02-21 SDOH — HEALTH STABILITY: PHYSICAL HEALTH: EXERCISE ACTIVITIES SETS: 3

## 2025-02-21 ASSESSMENT — ENCOUNTER SYMPTOMS
PAIN: 1
HYPERTENSION: 1
PAIN SEVERITY GOAL: 0/10
MUSCLE WEAKNESS: 1
LIMITED RANGE OF MOTION: 1
SUBJECTIVE PAIN PROGRESSION: WAXING AND WANING
PAIN LOCATION - EXACERBATING FACTORS: ROM
PAIN LOCATION - PAIN FREQUENCY: INTERMITTENT
LOWEST PAIN SEVERITY IN PAST 24 HOURS: 3/10
PAIN LOCATION: LEFT KNEE
PAIN LOCATION - PAIN DURATION: HR
PAIN LOCATION - RELIEVING FACTORS: CP/ MEDS
PERSON REPORTING PAIN: PATIENT
PAIN LOCATION - PAIN QUALITY: ACHE
HIGHEST PAIN SEVERITY IN PAST 24 HOURS: 7/10
PAIN LOCATION - PAIN SEVERITY: 7/10

## 2025-02-21 ASSESSMENT — ACTIVITIES OF DAILY LIVING (ADL)
PHYSICAL TRANSFERS ASSESSED: 1
AMBULATION ASSISTANCE ON FLAT SURFACES: 1
AMBULATION_DISTANCE/DURATION_TOLERATED: 50 FT
OASIS_M1830: 05
ENTERING_EXITING_HOME: MODERATE ASSIST
CURRENT_FUNCTION: MODERATE ASSIST

## 2025-02-21 NOTE — SIGNIFICANT EVENT
Thank you for taking my call today regarding your recent joint replacement surgery with Dr. Eric Perrin.      We discussed that: Home Health Care services (physical and/or occupational therapy) have been initiated Your pain is Controlled on the current regimen Will fluctuate throughout recovery with increased activity You are able to tolerate regular activity and exercises The importance of continued cold therapy throughout recovery The importance of following the prescribed precautions by your surgeon You have not had a bowel movement, and we discussed the importance of a well balanced diet, hydration, and continued use of stool softener/laxative as prescribed The importance of continuing blood thinner as prescribed The importance of wearing compression stockings as prescribed.    You indicated that all of your questions have been answered at the time of our call.    Please don't hesitate to reach out if you have any additional questions or concerns.    Lashae Boone MBA, BSN, RN-BC  Orthopedic Program Navigator  [facility]   [phone]

## 2025-02-22 ENCOUNTER — APPOINTMENT (OUTPATIENT)
Dept: RADIOLOGY | Facility: HOSPITAL | Age: 55
End: 2025-02-22
Payer: COMMERCIAL

## 2025-02-22 ENCOUNTER — APPOINTMENT (OUTPATIENT)
Dept: CARDIOLOGY | Facility: HOSPITAL | Age: 55
End: 2025-02-22
Payer: COMMERCIAL

## 2025-02-22 ENCOUNTER — HOSPITAL ENCOUNTER (EMERGENCY)
Facility: HOSPITAL | Age: 55
Discharge: HOME | End: 2025-02-23
Attending: INTERNAL MEDICINE
Payer: COMMERCIAL

## 2025-02-22 DIAGNOSIS — R07.9 CHEST PAIN, UNSPECIFIED TYPE: Primary | ICD-10-CM

## 2025-02-22 DIAGNOSIS — M71.22 SYNOVIAL CYST OF LEFT POPLITEAL SPACE: ICD-10-CM

## 2025-02-22 LAB
ALBUMIN SERPL BCP-MCNC: 3.5 G/DL (ref 3.4–5)
ALP SERPL-CCNC: 68 U/L (ref 33–110)
ALT SERPL W P-5'-P-CCNC: 19 U/L (ref 7–45)
ANION GAP SERPL CALC-SCNC: 14 MMOL/L (ref 10–20)
AST SERPL W P-5'-P-CCNC: 20 U/L (ref 9–39)
BASOPHILS # BLD AUTO: 0 X10*3/UL (ref 0–0.1)
BASOPHILS NFR BLD AUTO: 0 %
BILIRUB SERPL-MCNC: 0.5 MG/DL (ref 0–1.2)
BUN SERPL-MCNC: 9 MG/DL (ref 6–23)
CALCIUM SERPL-MCNC: 8.7 MG/DL (ref 8.6–10.3)
CARDIAC TROPONIN I PNL SERPL HS: 4 NG/L (ref 0–13)
CARDIAC TROPONIN I PNL SERPL HS: 4 NG/L (ref 0–13)
CHLORIDE SERPL-SCNC: 101 MMOL/L (ref 98–107)
CO2 SERPL-SCNC: 28 MMOL/L (ref 21–32)
CREAT SERPL-MCNC: 0.72 MG/DL (ref 0.5–1.05)
EGFRCR SERPLBLD CKD-EPI 2021: >90 ML/MIN/1.73M*2
EOSINOPHIL # BLD AUTO: 0 X10*3/UL (ref 0–0.7)
EOSINOPHIL NFR BLD AUTO: 0 %
ERYTHROCYTE [DISTWIDTH] IN BLOOD BY AUTOMATED COUNT: 14.9 % (ref 11.5–14.5)
GLUCOSE SERPL-MCNC: 93 MG/DL (ref 74–99)
HCT VFR BLD AUTO: 31.4 % (ref 36–46)
HGB BLD-MCNC: 9.6 G/DL (ref 12–16)
IMM GRANULOCYTES # BLD AUTO: 0.02 X10*3/UL (ref 0–0.7)
IMM GRANULOCYTES NFR BLD AUTO: 0.3 % (ref 0–0.9)
LYMPHOCYTES # BLD AUTO: 1.59 X10*3/UL (ref 1.2–4.8)
LYMPHOCYTES NFR BLD AUTO: 25.4 %
MAGNESIUM SERPL-MCNC: 1.64 MG/DL (ref 1.6–2.4)
MCH RBC QN AUTO: 27.2 PG (ref 26–34)
MCHC RBC AUTO-ENTMCNC: 30.6 G/DL (ref 32–36)
MCV RBC AUTO: 89 FL (ref 80–100)
MONOCYTES # BLD AUTO: 0.49 X10*3/UL (ref 0.1–1)
MONOCYTES NFR BLD AUTO: 7.8 %
NEUTROPHILS # BLD AUTO: 4.15 X10*3/UL (ref 1.2–7.7)
NEUTROPHILS NFR BLD AUTO: 66.5 %
NRBC BLD-RTO: 0 /100 WBCS (ref 0–0)
PLATELET # BLD AUTO: 299 X10*3/UL (ref 150–450)
POTASSIUM SERPL-SCNC: 3.6 MMOL/L (ref 3.5–5.3)
PROT SERPL-MCNC: 6.9 G/DL (ref 6.4–8.2)
RBC # BLD AUTO: 3.53 X10*6/UL (ref 4–5.2)
SODIUM SERPL-SCNC: 139 MMOL/L (ref 136–145)
WBC # BLD AUTO: 6.3 X10*3/UL (ref 4.4–11.3)

## 2025-02-22 PROCEDURE — 71275 CT ANGIOGRAPHY CHEST: CPT | Performed by: RADIOLOGY

## 2025-02-22 PROCEDURE — 71275 CT ANGIOGRAPHY CHEST: CPT

## 2025-02-22 PROCEDURE — 84155 ASSAY OF PROTEIN SERUM: CPT

## 2025-02-22 PROCEDURE — 93971 EXTREMITY STUDY: CPT

## 2025-02-22 PROCEDURE — 93971 EXTREMITY STUDY: CPT | Performed by: RADIOLOGY

## 2025-02-22 PROCEDURE — 96375 TX/PRO/DX INJ NEW DRUG ADDON: CPT | Mod: 59

## 2025-02-22 PROCEDURE — 85025 COMPLETE CBC W/AUTO DIFF WBC: CPT

## 2025-02-22 PROCEDURE — 36415 COLL VENOUS BLD VENIPUNCTURE: CPT

## 2025-02-22 PROCEDURE — 83735 ASSAY OF MAGNESIUM: CPT

## 2025-02-22 PROCEDURE — 71046 X-RAY EXAM CHEST 2 VIEWS: CPT | Performed by: RADIOLOGY

## 2025-02-22 PROCEDURE — 2500000004 HC RX 250 GENERAL PHARMACY W/ HCPCS (ALT 636 FOR OP/ED): Performed by: INTERNAL MEDICINE

## 2025-02-22 PROCEDURE — 99285 EMERGENCY DEPT VISIT HI MDM: CPT | Mod: 25 | Performed by: INTERNAL MEDICINE

## 2025-02-22 PROCEDURE — 2550000001 HC RX 255 CONTRASTS: Performed by: INTERNAL MEDICINE

## 2025-02-22 PROCEDURE — 84484 ASSAY OF TROPONIN QUANT: CPT

## 2025-02-22 PROCEDURE — 71046 X-RAY EXAM CHEST 2 VIEWS: CPT

## 2025-02-22 PROCEDURE — 93005 ELECTROCARDIOGRAM TRACING: CPT

## 2025-02-22 RX ORDER — MORPHINE SULFATE 4 MG/ML
4 INJECTION, SOLUTION INTRAMUSCULAR; INTRAVENOUS ONCE
Status: COMPLETED | OUTPATIENT
Start: 2025-02-22 | End: 2025-02-22

## 2025-02-22 RX ORDER — MORPHINE SULFATE 4 MG/ML
4 INJECTION, SOLUTION INTRAMUSCULAR; INTRAVENOUS ONCE
Status: COMPLETED | OUTPATIENT
Start: 2025-02-22 | End: 2025-02-23

## 2025-02-22 RX ORDER — KETOROLAC TROMETHAMINE 30 MG/ML
15 INJECTION, SOLUTION INTRAMUSCULAR; INTRAVENOUS ONCE
Status: COMPLETED | OUTPATIENT
Start: 2025-02-22 | End: 2025-02-23

## 2025-02-22 RX ADMIN — MORPHINE SULFATE 4 MG: 4 INJECTION, SOLUTION INTRAMUSCULAR; INTRAVENOUS at 22:47

## 2025-02-22 RX ADMIN — IOHEXOL 75 ML: 350 INJECTION, SOLUTION INTRAVENOUS at 22:11

## 2025-02-22 ASSESSMENT — PAIN - FUNCTIONAL ASSESSMENT: PAIN_FUNCTIONAL_ASSESSMENT: 0-10

## 2025-02-22 ASSESSMENT — PAIN SCALES - GENERAL
PAINLEVEL_OUTOF10: 10 - WORST POSSIBLE PAIN
PAINLEVEL_OUTOF10: 0 - NO PAIN
PAINLEVEL_OUTOF10: 10 - WORST POSSIBLE PAIN

## 2025-02-22 ASSESSMENT — PAIN DESCRIPTION - LOCATION: LOCATION: CHEST

## 2025-02-22 ASSESSMENT — PAIN DESCRIPTION - PAIN TYPE: TYPE: ACUTE PAIN

## 2025-02-22 NOTE — ED TRIAGE NOTES
Pt c/o CP that started last night int he center of her chest. Pt had a knee replacement 2/19 and stopped her blood thinner prior to surgery. Pt states her MD rest rated her taking a half a dose.

## 2025-02-22 NOTE — ED TRIAGE NOTES
As provider-in-triage, I performed a medical screening history and physical exam on this patient.  HISTORY OF PRESENT ILLNESS  Patient is a 55-year-old female PMH diabetes, A-fib, hypertension, hyperlipidemia presents to the ED with concern for chest pain.  Patient states she has had substernal chest pain starting last night that radiates to her left arm.  Denies any shortness of breath, cough, congestion, rhinorrhea, calf pain or swelling.  Patient has had a total knee replacement on the left on Wednesday and she has been on blood thinners for 4 days.  She has a prior history of DVT and PE in both lungs.      PHYSICAL EXAM  Vital Signs reviewed.  Cardiovascular: Regular rate and rhythm. No m/g/r  Respiratory: No respiratory distress. No accessory muscle use. Breath sounds are present and equal b/l. No adventitious sounds.        MDM  Workup initiated. Pt stable pending bed availability and further evaluation. Please see subsequent provider note for further details and disposition.       I evaluated this patient in triage with the RN. Due to the patients complaint labs and or imaging were ordered by myself in an attempt to expedite patient care however I am not participating in care after evaluation. This is a preliminary assessment. Pt does not appear in acute distress at this time. They will have a full evaluation as soon as possible. They will be cared for by another provider who will possibly order more labs, imaging or interventions. Pt did not have a full ROS or PE completed by myself however below is a summary with reasons for orders.  For the remainder of the patient's workup and ED course, please refer to the main ED provider note. We discussed need for diagnostic testing including laboratory studies and imaging.  We also discussed that patient may be asked to wait in the waiting room while these tests are pending.  They understand that if they choose to leave without having the testing completed or  resulted that we cannot rule out acute life threatening illnesses and the risks involved could lead to worsening condition, permanent disability or even death.

## 2025-02-23 VITALS
HEART RATE: 105 BPM | DIASTOLIC BLOOD PRESSURE: 93 MMHG | OXYGEN SATURATION: 96 % | TEMPERATURE: 99.7 F | WEIGHT: 227 LBS | HEIGHT: 66 IN | BODY MASS INDEX: 36.48 KG/M2 | RESPIRATION RATE: 16 BRPM | SYSTOLIC BLOOD PRESSURE: 140 MMHG

## 2025-02-23 PROCEDURE — 2500000004 HC RX 250 GENERAL PHARMACY W/ HCPCS (ALT 636 FOR OP/ED): Performed by: INTERNAL MEDICINE

## 2025-02-23 PROCEDURE — 96374 THER/PROPH/DIAG INJ IV PUSH: CPT

## 2025-02-23 PROCEDURE — 96376 TX/PRO/DX INJ SAME DRUG ADON: CPT

## 2025-02-23 RX ADMIN — MORPHINE SULFATE 4 MG: 4 INJECTION, SOLUTION INTRAMUSCULAR; INTRAVENOUS at 00:16

## 2025-02-23 RX ADMIN — KETOROLAC TROMETHAMINE 15 MG: 30 INJECTION, SOLUTION INTRAMUSCULAR at 00:13

## 2025-02-23 ASSESSMENT — PAIN DESCRIPTION - DESCRIPTORS
DESCRIPTORS: ACHING
DESCRIPTORS: ACHING

## 2025-02-23 ASSESSMENT — PAIN DESCRIPTION - PAIN TYPE
TYPE: ACUTE PAIN
TYPE: ACUTE PAIN

## 2025-02-23 ASSESSMENT — PAIN - FUNCTIONAL ASSESSMENT
PAIN_FUNCTIONAL_ASSESSMENT: 0-10
PAIN_FUNCTIONAL_ASSESSMENT: 0-10

## 2025-02-23 ASSESSMENT — PAIN DESCRIPTION - PROGRESSION: CLINICAL_PROGRESSION: RAPIDLY IMPROVING

## 2025-02-23 ASSESSMENT — PAIN SCALES - GENERAL
PAINLEVEL_OUTOF10: 5 - MODERATE PAIN
PAINLEVEL_OUTOF10: 2

## 2025-02-23 ASSESSMENT — PAIN DESCRIPTION - LOCATION
LOCATION: CHEST
LOCATION: CHEST

## 2025-02-23 ASSESSMENT — PAIN DESCRIPTION - ORIENTATION
ORIENTATION: MID
ORIENTATION: MID

## 2025-02-23 NOTE — DISCHARGE INSTRUCTIONS
You were seen today for chest pain.  Your evaluation was not concerning for an emergency at this time.  Please follow-up with your cardiologist to see if they want to do additional tests as an outpatient.  Please see the attached information sheet for information about your condition, how to care for your condition at home, and reasons to return to the emergency department. Take any prescriptions written today as prescribed. You should call your primary care provider within 24 hours to tell them about today's visit, including any new medications or medication changes, as he or she may want to see you in the office for further evaluation. If you do not have a primary care provider, call  [phone] for an appointment. We offer in-person office visits as well as virtual options. Please do not hesitate to call  [phone] or return to the emergency department with any new or unresolved concerns or symptoms. Thank you for choosing [facility] for your care.

## 2025-02-23 NOTE — ED PROVIDER NOTES
HPI     CC: Chest Pain     HPI: Betzaida Jc is a 55 y.o. female with a history of left TKA 2/19 without complications, asthma, PAF and DVT/PE on Eliquis, HTN, HLD, GERD, IBS, kidney stones, elevated BMI, BRIDGER, presents with chest pain.  Symptoms started last night.  Pain is localized in the middle of her chest rating to the right side.  It is constant, 10/10 severity.   It does not radiate to the arm, neck, or back.  She has some associated shortness of breath and chills, some pain on deep inspiration. She is also complaining of 10/10 severity pain in her left lower extremity (Same leg as recent surgery) mainly behind the calf and thigh.  She denies any cough or fever.  Patient does state that her Eliquis was on hold 4 days prior to her surgery and she has been on half dose Eliquis since her surgery.  Her symptoms feel like her prior PE.    ROS: 10-point review of systems was performed and is otherwise negative except as noted in HPI.    Limitations to history: N/A    Independent Historians: N/A    External Records Reviewed: Sister at bedside    Past Medical History: Noncontributory except per HPI     Past Surgical History: Noncontributory except per HPI     Family History: Reviewed and noncontributory     Social History:  Denies tobacco. Denies ETOH. Denies illicit drugs.    Social Determinants Affecting Care: Outpatient notes in EMR    Allergies   Allergen Reactions    Penicillins Other, Shortness of breath and Unknown     asthma    Sulfa (Sulfonamide Antibiotics) Other     Asthma        Home Meds:   Current Outpatient Medications   Medication Instructions    albuterol 90 mcg/actuation inhaler Every 4 hours PRN    Alcohol Prep Pads pads, medicated USE TO TEST BLOOD SUGAR ONCE DAILY    apixaban (ELIQUIS) 5 mg, 2 times daily    atorvastatin (LIPITOR) 40 mg, Daily    benralizumab (FASENRA) 30 mg, subcutaneous, Every 8 weeks    benralizumab (FASENRA) 30 mg, subcutaneous, Every 8 weeks    budesonide-formoteroL  (Symbicort) 80-4.5 mcg/actuation inhaler 2 puffs, inhalation, 2 times daily PRN, Rinse mouth with water after use to reduce aftertaste and incidence of candidiasis. Do not swallow.    cyanocobalamin, vitamin B-12, (VITAMIN B-12 ORAL) Daily    diclofenac sodium (Voltaren) 1 % gel gel 1 Application, Topical, 4 times daily PRN    diphenhydrAMINE (BENADRYL) 25-50 mg, Every 4 hours PRN    docusate sodium (COLACE) 100 mg, oral, 2 times daily    Eliquis 2.5 mg, oral, 2 times daily, After 5 days, resume regular home dose.    ergocalciferol (Vitamin D-2) 1.25 MG (97703 UT) capsule 1 capsule, Every 7 days    Fasenra 30 mg, subcutaneous, Every 28 days    flecainide (TAMBOCOR) 100 mg, 2 times daily    fluticasone (Flonase) 50 mcg/actuation nasal spray 2 sprays, Each Nostril, Daily, Shake gently. Before first use, prime pump. After use, clean tip and replace cap.    folic acid (Folvite) 1 mg tablet Daily    linaCLOtide (Linzess) 145 mcg capsule 1 tablet, Daily PRN    lisinopril 40 mg, Daily    loratadine (Claritin) 10 mg tablet 1 tablet, Daily    meclizine (ANTIVERT) 25 mg, Every 12 hours PRN    metFORMIN (Glucophage) 500 mg tablet TAKE 1 TABLET BY MOUTH TWICE A DAY WITH A MEAL FOR 30 DAYS    metoprolol tartrate (LOPRESSOR) 100 mg, 2 times daily    montelukast (Singulair) 10 mg tablet TAKE 1 TABLET (10 MG) BY MOUTH ONCE EVERY 24 HOURS.    omeprazole (PriLOSEC) 40 mg DR capsule 1 capsule, Every 24 hours    ondansetron (ZOFRAN) 4 mg, Every 6 hours PRN    OneTouch Delica Plus Lancet 33 gauge misc USE 1 LANCET TO TEST BLOOD SUGAR THREE TIMES DAILY    OneTouch Verio Flex meter misc TEST BLOOD SUGAR ONCE DAILY    OneTouch Verio test strips strip USE TO TEST BLOOD SUGAR ONCE DAILY    oxyCODONE (ROXICODONE) 5 mg, oral, Every 6 hours PRN    Pain Relief ES (acetaminophen) 1,000 mg, oral, Every 6 hours PRN    pantoprazole (PROTONIX) 40 mg, oral, Daily, Do not crush, chew, or split.    polyethylene glycol (Glycolax, Miralax) 17 gram/dose  "powder Mix 1 cap (17g) of powder into 8 ounces of fluid and drink once daily.    tirzepatide 7.5 mg, Every 7 days    torsemide (DEMADEX) 20 mg, oral, Daily    traMADol (ULTRAM) 50 mg, oral, Every 6 hours PRN        Physical Exam     ED Triage Vitals   Temperature Heart Rate Respirations BP   02/22/25 1520 02/22/25 1520 02/22/25 2028 02/22/25 1520   37.6 °C (99.7 °F) 95 16 (!) 149/96      Pulse Ox Temp Source Heart Rate Source Patient Position   02/22/25 1520 02/22/25 1520 02/22/25 1520 02/22/25 1520   99 % Temporal Monitor Sitting      BP Location FiO2 (%)     02/22/25 1520 --     Left arm          Heart Rate:  []   Temperature:  [37.6 °C (99.7 °F)]   Respirations:  [16-18]   BP: (124-149)/()   Height:  [167.6 cm (5' 6\")]   Weight:  [103 kg (227 lb)]   Pulse Ox:  [94 %-100 %]      Physical Exam  Vitals and nursing note reviewed.     CONSTITUTIONAL: Well appearing, well nourished, in no acute distress.   HENMT: Head atraumatic. Airway patent. Nasal mucosa clear. Mouth with normal mucosa, clear oropharynx. Uvula midline. Neck supple.    EYES: Clear bilaterally, pupils equally round and reactive to light.   CARDIOVASCULAR: Normal rate, regular rhythm.  Heart sounds S1, S2.  No murmurs, rubs or gallops. Normal pulses. Capillary refill < 2 sec.   RESPIRATORY: No increased work of breathing. Breath sounds clear and equal bilaterally.  GASTROINTESTINAL: Abdomen soft, non-distended, non-tender. No rebound, no guarding. Normal bowel sounds. No palpable masses.  GENITOURINARY:  No CVA tenderness.  MUSCULOSKELETAL: Left lower extremity diffusely mildly swollen compared to right with mild tenderness throughout, mild warmth.  L knee incision covered with dressing but no surrounding erythema or warmth or focal tenderness to the incision or joint lines.  Mildly reproducible sternal chest tenderness.  Spine appears normal, range of motion is not limited, no muscle or joint tenderness.   NEUROLOGICAL: Alert and oriented, " no asymmetry, moving all extremities equally.  SKIN: Warm, dry and intact. No rash or notable lesions.  PSYCHIATRIC: Normal mood and affect.  HEME/LYMPH: No adenopathy or splenomegaly.    Diagnostic Results      ECG: ECGs read and interpreted by me. See ED Course, below, for interpretation.    Labs Reviewed   CBC WITH AUTO DIFFERENTIAL - Abnormal       Result Value    WBC 6.3      nRBC 0.0      RBC 3.53 (*)     Hemoglobin 9.6 (*)     Hematocrit 31.4 (*)     MCV 89      MCH 27.2      MCHC 30.6 (*)     RDW 14.9 (*)     Platelets 299      Neutrophils % 66.5      Immature Granulocytes %, Automated 0.3      Lymphocytes % 25.4      Monocytes % 7.8      Eosinophils % 0.0      Basophils % 0.0      Neutrophils Absolute 4.15      Immature Granulocytes Absolute, Automated 0.02      Lymphocytes Absolute 1.59      Monocytes Absolute 0.49      Eosinophils Absolute 0.00      Basophils Absolute 0.00     MAGNESIUM - Normal    Magnesium 1.64     COMPREHENSIVE METABOLIC PANEL - Normal    Glucose 93      Sodium 139      Potassium 3.6      Chloride 101      Bicarbonate 28      Anion Gap 14      Urea Nitrogen 9      Creatinine 0.72      eGFR >90      Calcium 8.7      Albumin 3.5      Alkaline Phosphatase 68      Total Protein 6.9      AST 20      Bilirubin, Total 0.5      ALT 19     SERIAL TROPONIN-INITIAL - Normal    Troponin I, High Sensitivity 4      Narrative:     Less than 99th percentile of normal range cutoff-  Female and children under 18 years old <14 ng/L; Male <21 ng/L: Negative  Repeat testing should be performed if clinically indicated.     Female and children under 18 years old 14-50 ng/L; Male 21-50 ng/L:  Consistent with possible cardiac damage and possible increased clinical   risk. Serial measurements may help to assess extent of myocardial damage.     >50 ng/L: Consistent with cardiac damage, increased clinical risk and  myocardial infarction. Serial measurements may help assess extent of   myocardial damage.       NOTE: Children less than 1 year old may have higher baseline troponin   levels and results should be interpreted in conjunction with the overall   clinical context.     NOTE: Troponin I testing is performed using a different   testing methodology at [facility] than at other   [facility]. Direct result comparisons should only   be made within the same method.   SERIAL TROPONIN, 1 HOUR - Normal    Troponin I, High Sensitivity 4      Narrative:     Less than 99th percentile of normal range cutoff-  Female and children under 18 years old <14 ng/L; Male <21 ng/L: Negative  Repeat testing should be performed if clinically indicated.     Female and children under 18 years old 14-50 ng/L; Male 21-50 ng/L:  Consistent with possible cardiac damage and possible increased clinical   risk. Serial measurements may help to assess extent of myocardial damage.     >50 ng/L: Consistent with cardiac damage, increased clinical risk and  myocardial infarction. Serial measurements may help assess extent of   myocardial damage.      NOTE: Children less than 1 year old may have higher baseline troponin   levels and results should be interpreted in conjunction with the overall   clinical context.     NOTE: Troponin I testing is performed using a different   testing methodology at [facility] than at other   [facility]. Direct result comparisons should only   be made within the same method.   TROPONIN SERIES- (INITIAL, 1 HR)    Narrative:     The following orders were created for panel order Troponin I Series, High Sensitivity (0, 1 HR).  Procedure                               Abnormality         Status                     ---------                               -----------         ------                     Troponin I, High Sensiti...[[id number]]  Normal              Final result               Troponin, High Sensitivi...[[id number]]  Normal              Final result                 Please view results  for these tests on the individual orders.         CT angio chest for pulmonary embolism   Final Result   1. No acute pulmonary embolism to the segmental arterial level.   2. Main pulmonary artery is dilated up to 3.4 cm which can be seen   with pulmonary arterial hypertension.   3. Small hiatal hernia.   4. Additional findings as noted above.        MACRO:   .        Signed by: Suraj Poon 2/22/2025 10:44 PM   Dictation workstation:   [id number]      Lower extremity venous duplex left   Final Result   No sonographic evidence of acute DVT in the visualized vessels of the   left lower extremity.        There is a 4.6 x 1.4 x 3.5 cm hypoechoic collection in the left   popliteal fossa suggestive of a Baker's cyst.v        MACRO:   None             Signed by: Suraj Poon 2/22/2025 10:38 PM   Dictation workstation:   [id number]      XR chest 2 views   Final Result   Streaky bibasilar opacities suggestive of atelectasis.        Signed by: Isidra Shay 2/22/2025 6:08 PM   Dictation workstation:   [id number]                    No data recorded                Procedure  Procedures    ED Course & MDM   Assessment/Plan:   Betzaida Jc is a 55 y.o. female with a history of left TKA 2/19 without complications, asthma, PAF and DVT/PE on Eliquis, HTN, HLD, GERD, IBS, kidney stones, elevated BMI, BRIDGER, presents with chest pain.  Pain radiates to the right side of her chest, is associated with shortness of breath, pleurisy, and left leg pain.  It feels similar to her prior PE.  ECG is nonischemic.  She is hemodynamically stable albeit mildly hypertensive 144/100.  Will need to rule out PE given her leg pain, pleurisy, chest pain, and reviewed his Eliquis dose of late.  Low suspicion ACS as initial labs are already performed at the time of my seeing her, are notable for negative troponin x 2.  Rest of labs are largely unremarkable with normal left lower extremity mildly swollen and diffusely tender compared to right.   WBC 6.3, stable anemia 9.6, normal platelets, normal CMP and magnesium.    See below for details of ED course and ultimate disposition.    Medications   morphine injection 4 mg (4 mg intravenous Given 2/22/25 2247)   iohexol (OMNIPaque) 350 mg iodine/mL solution 75 mL (75 mL intravenous Given 2/22/25 2211)   morphine injection 4 mg (4 mg intravenous Given 2/23/25 0016)   ketorolac (Toradol) injection 15 mg (15 mg intravenous Given 2/23/25 0013)        ED Course as of 02/23/25 0025   Sat Feb 22, 2025 2236 ECG read interpreted by me.  Sinus rhythm with PACs, rate 96.  Normal axis.  Normal intervals.  No significant ST or T wave derangements. [CG]   2254 DVT US negative. +Bakers cyst. [CG]   2255 CTPE 1. No acute pulmonary embolism to the segmental arterial level.  2. Main pulmonary artery is dilated up to 3.4 cm which can be seen with pulmonary arterial hypertension.  3. Small hiatal hernia.  4. Additional findings as noted above.   [CG]   2340 Patient reevaluated. Still having significant chest pain, no response to morphine. Will give additional dose and trial toradol. [CG]   Sun Feb 23, 2025 0024 RN asked me to reevaluate patient and she is asking the plan.  She was reassured by her negative workup.  Her pain is improved.  She was offered admission for observation for cardiac risk ratification but she would prefer to try to go home.  She is mildly tachycardic after getting the pain meds.  She does have a cardiologist that she follows with and was advised to call them for further outpatient management.  Patient was discharged home with anticipatory guidance and strict return precautions. [CG]      ED Course User Index  [CG] Nikki Lima MD         Diagnoses as of 02/23/25 0025   Chest pain, unspecified type   Synovial cyst of left popliteal space       Disposition:   DISCHARGE.  The patient was discharged with both verbal and written anticipatory guidance and strict return precautions. Discharge diagnosis,  instructions and plan were discussed and understood. I emphasized the importance of following up with their primary care provider within 24-48 hours and with any referrals in the timeframe recommended. At the time of discharge the patient was comfortable and was in no apparent distress. Patient is aware of diagnostic uncertainty and was notified though testing is negative here, there is a very small chance that pathology may be missed.  The patient understands these risks and the patient/family understood to call 911 or return immediately to the emergency department if the symptoms worsen or if they have any additional concerns.      FOLLOW UP  Primary care provider in 1-2 days.      ED Prescriptions    None         Nikki Lima MD  EM/IM/Peds    This note was dictated by speech recognition. Minor errors in transcription may be present.     Nikki Lima MD  02/23/25 0025

## 2025-02-24 ENCOUNTER — HOME CARE VISIT (OUTPATIENT)
Dept: HOME HEALTH SERVICES | Facility: HOME HEALTH | Age: 55
End: 2025-02-24
Payer: COMMERCIAL

## 2025-02-24 VITALS — OXYGEN SATURATION: 95 % | RESPIRATION RATE: 16 BRPM

## 2025-02-24 PROCEDURE — G0151 HHCP-SERV OF PT,EA 15 MIN: HCPCS

## 2025-02-24 SDOH — HEALTH STABILITY: PHYSICAL HEALTH: EXERCISE TYPE: TKA

## 2025-02-24 SDOH — HEALTH STABILITY: PHYSICAL HEALTH: EXERCISE ACTIVITY: OPEN AND  CLOSED CHAIN EX

## 2025-02-24 SDOH — HEALTH STABILITY: PHYSICAL HEALTH: PHYSICAL EXERCISE: SIT, STAND, SUPINE

## 2025-02-24 SDOH — HEALTH STABILITY: PHYSICAL HEALTH: PHYSICAL EXERCISE: 15

## 2025-02-24 SDOH — HEALTH STABILITY: PHYSICAL HEALTH: EXERCISE ACTIVITIES SETS: 3

## 2025-02-24 ASSESSMENT — ENCOUNTER SYMPTOMS
PAIN LOCATION - EXACERBATING FACTORS: ROM
MUSCLE WEAKNESS: 1
PAIN SEVERITY GOAL: 0/10
LIMITED RANGE OF MOTION: 1
PERSON REPORTING PAIN: PATIENT
PAIN LOCATION - PAIN FREQUENCY: INTERMITTENT
PAIN: 1
PAIN LOCATION: LEFT KNEE
HIGHEST PAIN SEVERITY IN PAST 24 HOURS: 4/10
PAIN LOCATION - PAIN QUALITY: ACHE
LOWEST PAIN SEVERITY IN PAST 24 HOURS: 2/10
PAIN LOCATION - PAIN DURATION: MIN
PAIN LOCATION - PAIN SEVERITY: 5/10

## 2025-02-24 ASSESSMENT — ACTIVITIES OF DAILY LIVING (ADL)
AMBULATION ASSISTANCE ON FLAT SURFACES: 1
CURRENT_FUNCTION: INDEPENDENT
PHYSICAL TRANSFERS ASSESSED: 1
AMBULATION_DISTANCE/DURATION_TOLERATED: 100 FT

## 2025-02-26 ENCOUNTER — HOME CARE VISIT (OUTPATIENT)
Dept: HOME HEALTH SERVICES | Facility: HOME HEALTH | Age: 55
End: 2025-02-26
Payer: COMMERCIAL

## 2025-02-26 VITALS — RESPIRATION RATE: 16 BRPM

## 2025-02-26 PROCEDURE — G0151 HHCP-SERV OF PT,EA 15 MIN: HCPCS

## 2025-02-26 SDOH — HEALTH STABILITY: PHYSICAL HEALTH: PHYSICAL EXERCISE: SIT, STAND, SUPINE

## 2025-02-26 SDOH — HEALTH STABILITY: PHYSICAL HEALTH: EXERCISE ACTIVITY: OPEN/ CLOSED CHAIN

## 2025-02-26 SDOH — HEALTH STABILITY: PHYSICAL HEALTH: EXERCISE TYPE: TKA

## 2025-02-26 SDOH — HEALTH STABILITY: PHYSICAL HEALTH: PHYSICAL EXERCISE: 15

## 2025-02-26 SDOH — HEALTH STABILITY: PHYSICAL HEALTH: EXERCISE ACTIVITIES SETS: 3

## 2025-02-26 ASSESSMENT — ACTIVITIES OF DAILY LIVING (ADL)
AMBULATION ASSISTANCE ON FLAT SURFACES: 1
AMBULATION ASSISTANCE: 1
AMBULATION ASSISTANCE: INDEPENDENT
AMBULATION_DISTANCE/DURATION_TOLERATED: 100 FT

## 2025-02-26 ASSESSMENT — ENCOUNTER SYMPTOMS
PAIN LOCATION - PAIN FREQUENCY: INTERMITTENT
HIGHEST PAIN SEVERITY IN PAST 24 HOURS: 5/10
PAIN LOCATION: LEFT KNEE
PAIN SEVERITY GOAL: 0/10
PERSON REPORTING PAIN: PATIENT
PAIN LOCATION - EXACERBATING FACTORS: ROM
SUBJECTIVE PAIN PROGRESSION: WAXING AND WANING
LOWEST PAIN SEVERITY IN PAST 24 HOURS: 1/10
PAIN LOCATION - PAIN QUALITY: ACHE
MUSCLE WEAKNESS: 1
PAIN LOCATION - PAIN SEVERITY: 6/10
PAIN: 1
LIMITED RANGE OF MOTION: 1
PAIN LOCATION - PAIN DURATION: HR

## 2025-02-28 DIAGNOSIS — Z96.652 S/P TKR (TOTAL KNEE REPLACEMENT) USING CEMENT, LEFT: Primary | ICD-10-CM

## 2025-02-28 LAB
ATRIAL RATE: 96 BPM
P AXIS: 2 DEGREES
P OFFSET: 192 MS
P ONSET: 145 MS
PR INTERVAL: 142 MS
Q ONSET: 216 MS
QRS COUNT: 16 BEATS
QRS DURATION: 90 MS
QT INTERVAL: 372 MS
QTC CALCULATION(BAZETT): 469 MS
QTC FREDERICIA: 435 MS
R AXIS: -16 DEGREES
T AXIS: 7 DEGREES
T OFFSET: 402 MS
VENTRICULAR RATE: 96 BPM

## 2025-02-28 RX ORDER — OXYCODONE HYDROCHLORIDE 5 MG/1
5 TABLET ORAL EVERY 6 HOURS PRN
Qty: 28 TABLET | Refills: 0 | Status: SHIPPED | OUTPATIENT
Start: 2025-02-28 | End: 2025-03-07

## 2025-02-28 RX ORDER — TRAMADOL HYDROCHLORIDE 50 MG/1
50 TABLET ORAL EVERY 6 HOURS PRN
Qty: 28 TABLET | Refills: 0 | Status: SHIPPED | OUTPATIENT
Start: 2025-02-28 | End: 2025-03-07

## 2025-03-03 ENCOUNTER — HOME CARE VISIT (OUTPATIENT)
Dept: HOME HEALTH SERVICES | Facility: HOME HEALTH | Age: 55
End: 2025-03-03
Payer: COMMERCIAL

## 2025-03-03 VITALS — RESPIRATION RATE: 16 BRPM

## 2025-03-03 PROCEDURE — G0151 HHCP-SERV OF PT,EA 15 MIN: HCPCS

## 2025-03-03 SDOH — HEALTH STABILITY: PHYSICAL HEALTH: EXERCISE ACTIVITIES SETS: 3

## 2025-03-03 SDOH — HEALTH STABILITY: PHYSICAL HEALTH: EXERCISE ACTIVITY: OPEN/ CLOSED   CHAIN

## 2025-03-03 SDOH — HEALTH STABILITY: PHYSICAL HEALTH: PHYSICAL EXERCISE: SIT, STAND, SUPINE

## 2025-03-03 SDOH — HEALTH STABILITY: PHYSICAL HEALTH: PHYSICAL EXERCISE: 15

## 2025-03-03 SDOH — HEALTH STABILITY: PHYSICAL HEALTH: EXERCISE TYPE: TKA

## 2025-03-03 ASSESSMENT — ENCOUNTER SYMPTOMS
LOWEST PAIN SEVERITY IN PAST 24 HOURS: 1/10
PERSON REPORTING PAIN: PATIENT
PAIN: 1
PAIN LOCATION - PAIN FREQUENCY: INTERMITTENT
PAIN LOCATION - PAIN DURATION: MIN
PAIN LOCATION - EXACERBATING FACTORS: ROM
LIMITED RANGE OF MOTION: 1
PAIN LOCATION: LEFT KNEE
HIGHEST PAIN SEVERITY IN PAST 24 HOURS: 5/10
SUBJECTIVE PAIN PROGRESSION: WAXING AND WANING
MUSCLE WEAKNESS: 1
PAIN LOCATION - PAIN QUALITY: ACHE
PAIN LOCATION - RELIEVING FACTORS: CP/ MEDS
PAIN LOCATION - PAIN SEVERITY: 5/10
PAIN SEVERITY GOAL: 0/10

## 2025-03-03 ASSESSMENT — ACTIVITIES OF DAILY LIVING (ADL)
PHYSICAL TRANSFERS ASSESSED: 1
CURRENT_FUNCTION: INDEPENDENT
AMBULATION_DISTANCE/DURATION_TOLERATED: 100 FT
AMBULATION ASSISTANCE ON FLAT SURFACES: 1

## 2025-03-06 ENCOUNTER — APPOINTMENT (OUTPATIENT)
Dept: INFUSION THERAPY | Facility: CLINIC | Age: 55
End: 2025-03-06
Payer: COMMERCIAL

## 2025-03-06 ENCOUNTER — HOME CARE VISIT (OUTPATIENT)
Dept: HOME HEALTH SERVICES | Facility: HOME HEALTH | Age: 55
End: 2025-03-06
Payer: COMMERCIAL

## 2025-03-06 VITALS — RESPIRATION RATE: 16 BRPM

## 2025-03-06 PROCEDURE — G0151 HHCP-SERV OF PT,EA 15 MIN: HCPCS

## 2025-03-06 SDOH — HEALTH STABILITY: PHYSICAL HEALTH: EXERCISE ACTIVITY: OPEN/ CLOSED CHAIN

## 2025-03-06 SDOH — HEALTH STABILITY: PHYSICAL HEALTH: EXERCISE ACTIVITIES SETS: 3

## 2025-03-06 SDOH — HEALTH STABILITY: PHYSICAL HEALTH: EXERCISE TYPE: TKA

## 2025-03-06 SDOH — HEALTH STABILITY: PHYSICAL HEALTH: PHYSICAL EXERCISE: SIT, SUPINE,     STAND

## 2025-03-06 SDOH — HEALTH STABILITY: PHYSICAL HEALTH: PHYSICAL EXERCISE: 15

## 2025-03-06 ASSESSMENT — ACTIVITIES OF DAILY LIVING (ADL)
CURRENT_FUNCTION: INDEPENDENT
AMBULATION_DISTANCE/DURATION_TOLERATED: 100 FT
AMBULATION ASSISTANCE ON FLAT SURFACES: 1
PHYSICAL TRANSFERS ASSESSED: 1

## 2025-03-06 ASSESSMENT — ENCOUNTER SYMPTOMS
PAIN LOCATION - PAIN SEVERITY: 3/10
PAIN LOCATION - RELIEVING FACTORS: MEDS
LOWEST PAIN SEVERITY IN PAST 24 HOURS: 1/10
PAIN LOCATION - PAIN QUALITY: ACHE
PAIN LOCATION - EXACERBATING FACTORS: ROM
PAIN LOCATION - PAIN FREQUENCY: INTERMITTENT
PAIN LOCATION: LEFT KNEE
PAIN SEVERITY GOAL: 0/10
HIGHEST PAIN SEVERITY IN PAST 24 HOURS: 3/10
PAIN LOCATION - PAIN DURATION: MIN
PAIN: 1
PERSON REPORTING PAIN: PATIENT
LIMITED RANGE OF MOTION: 1
MUSCLE WEAKNESS: 1
SUBJECTIVE PAIN PROGRESSION: WAXING AND WANING

## 2025-03-10 ENCOUNTER — HOME CARE VISIT (OUTPATIENT)
Dept: HOME HEALTH SERVICES | Facility: HOME HEALTH | Age: 55
End: 2025-03-10
Payer: COMMERCIAL

## 2025-03-10 VITALS — RESPIRATION RATE: 14 BRPM

## 2025-03-10 PROCEDURE — G0151 HHCP-SERV OF PT,EA 15 MIN: HCPCS

## 2025-03-10 SDOH — HEALTH STABILITY: PHYSICAL HEALTH: EXERCISE ACTIVITY: OPEN/ CLOSED CHAIN

## 2025-03-10 SDOH — HEALTH STABILITY: PHYSICAL HEALTH: PHYSICAL EXERCISE: 15

## 2025-03-10 SDOH — HEALTH STABILITY: PHYSICAL HEALTH: PHYSICAL EXERCISE: SIT, STAND, SUPINE

## 2025-03-10 SDOH — HEALTH STABILITY: PHYSICAL HEALTH: EXERCISE ACTIVITIES SETS: 3

## 2025-03-10 SDOH — HEALTH STABILITY: PHYSICAL HEALTH: EXERCISE TYPE: TKA

## 2025-03-10 ASSESSMENT — ENCOUNTER SYMPTOMS
MUSCLE WEAKNESS: 1
PAIN LOCATION - PAIN FREQUENCY: INTERMITTENT
PAIN LOCATION - EXACERBATING FACTORS: ROM
LIMITED RANGE OF MOTION: 1
PAIN SEVERITY GOAL: 0/10
PAIN LOCATION - PAIN DURATION: MIN
HIGHEST PAIN SEVERITY IN PAST 24 HOURS: 4/10
PERSON REPORTING PAIN: PATIENT
LOWEST PAIN SEVERITY IN PAST 24 HOURS: 1/10
PAIN LOCATION - PAIN SEVERITY: 4/10
PAIN LOCATION: LEFT KNEE
PAIN LOCATION - PAIN QUALITY: ACHE
PAIN: 1
PAIN LOCATION - RELIEVING FACTORS: MEDS, CP

## 2025-03-10 ASSESSMENT — ACTIVITIES OF DAILY LIVING (ADL)
AMBULATION_DISTANCE/DURATION_TOLERATED: 100 FT
CURRENT_FUNCTION: INDEPENDENT
AMBULATION ASSISTANCE ON FLAT SURFACES: 1
PHYSICAL TRANSFERS ASSESSED: 1

## 2025-03-12 ENCOUNTER — HOME CARE VISIT (OUTPATIENT)
Dept: HOME HEALTH SERVICES | Facility: HOME HEALTH | Age: 55
End: 2025-03-12
Payer: COMMERCIAL

## 2025-03-12 VITALS — RESPIRATION RATE: 16 BRPM

## 2025-03-12 PROCEDURE — G0151 HHCP-SERV OF PT,EA 15 MIN: HCPCS

## 2025-03-12 SDOH — HEALTH STABILITY: PHYSICAL HEALTH: EXERCISE ACTIVITY: OPEN, CLOSED  CHAIN

## 2025-03-12 SDOH — HEALTH STABILITY: PHYSICAL HEALTH: EXERCISE ACTIVITIES SETS: 3

## 2025-03-12 SDOH — HEALTH STABILITY: PHYSICAL HEALTH: PHYSICAL EXERCISE: 15

## 2025-03-12 SDOH — HEALTH STABILITY: PHYSICAL HEALTH: EXERCISE TYPE: TKA

## 2025-03-12 SDOH — HEALTH STABILITY: PHYSICAL HEALTH: PHYSICAL EXERCISE: SIT, STAND, SUPINE

## 2025-03-12 ASSESSMENT — ENCOUNTER SYMPTOMS
PAIN SEVERITY GOAL: 0/10
PAIN: 1
PERSON REPORTING PAIN: PATIENT
PAIN LOCATION: LEFT KNEE
HIGHEST PAIN SEVERITY IN PAST 24 HOURS: 5/10
MUSCLE WEAKNESS: 1
SUBJECTIVE PAIN PROGRESSION: GRADUALLY IMPROVING
PAIN LOCATION - PAIN SEVERITY: 5/10
LOWEST PAIN SEVERITY IN PAST 24 HOURS: 2/10
PAIN LOCATION - PAIN QUALITY: ACHE
PAIN LOCATION - RELIEVING FACTORS: CP/ MEDS
PAIN LOCATION - PAIN FREQUENCY: INTERMITTENT
PAIN LOCATION - PAIN DURATION: HR
LIMITED RANGE OF MOTION: 1
PAIN LOCATION - EXACERBATING FACTORS: ROM

## 2025-03-12 ASSESSMENT — ACTIVITIES OF DAILY LIVING (ADL)
OASIS_M1830: 00
PHYSICAL TRANSFERS ASSESSED: 1
AMBULATION ASSISTANCE ON FLAT SURFACES: 1
CURRENT_FUNCTION: INDEPENDENT
AMBULATION_DISTANCE/DURATION_TOLERATED: 100 FT
HOME_HEALTH_OASIS: 00

## 2025-03-18 DIAGNOSIS — Z96.652 S/P TKR (TOTAL KNEE REPLACEMENT) USING CEMENT, LEFT: Primary | ICD-10-CM

## 2025-03-18 RX ORDER — TRAMADOL HYDROCHLORIDE 50 MG/1
50 TABLET ORAL EVERY 6 HOURS PRN
Qty: 28 TABLET | Refills: 0 | Status: SHIPPED | OUTPATIENT
Start: 2025-03-18 | End: 2025-03-25

## 2025-03-20 ENCOUNTER — EVALUATION (OUTPATIENT)
Dept: PHYSICAL THERAPY | Facility: CLINIC | Age: 55
End: 2025-03-20
Payer: COMMERCIAL

## 2025-03-20 ENCOUNTER — APPOINTMENT (OUTPATIENT)
Dept: INFUSION THERAPY | Facility: CLINIC | Age: 55
End: 2025-03-20
Payer: COMMERCIAL

## 2025-03-20 DIAGNOSIS — M17.12 UNILATERAL PRIMARY OSTEOARTHRITIS, LEFT KNEE: ICD-10-CM

## 2025-03-20 DIAGNOSIS — M25.562 ACUTE PAIN OF LEFT KNEE: Primary | ICD-10-CM

## 2025-03-20 PROCEDURE — 97161 PT EVAL LOW COMPLEX 20 MIN: CPT | Mod: GP

## 2025-03-20 PROCEDURE — 97110 THERAPEUTIC EXERCISES: CPT | Mod: GP

## 2025-03-20 ASSESSMENT — ENCOUNTER SYMPTOMS
DEPRESSION: 0
OCCASIONAL FEELINGS OF UNSTEADINESS: 0
LOSS OF SENSATION IN FEET: 0

## 2025-03-20 NOTE — PROGRESS NOTES
Physical Therapy Evaluation    Patient Name: Betzaida Jc  MRN: [id number]  Today's Date: 3/20/2025  Time Calculation  Start Time: 0932  Stop Time: 1025  Time Calculation (min): 53 min  PT Evaluation Time Entry  PT Evaluation (Low) Time Entry: 20  PT Therapeutic Procedures Time Entry  Therapeutic Exercise Time Entry: 25  PT Modalities Time Entry  Hot/Cold Pack Time Entry: 8     Insurance: [employer], 30 visits (1 used)  Plan of Care: 3/20/25 to 6/18/25  Visit #1    Referring MD Eric Perrin/Renetta Genao  Imaging Performed X-rays showed IMPRESSION:  No hardware failure about the left total knee arthroplasty.    Assessment     Betzaida Jc is a 55 y.o. referred for s/p L TKA by Dr. Perrin on 2/19/25. Patient demonstrates decreased L knee AROM, L knee swelling, healing L knee incision, decreased LLE strength, altered gait mechanics, decreased balance/proprioception, and pain. At this time, patient is limited with walking, standing, negotiating steps, turning over in bed, performing certain household activities, sleeping. Patient will benefit from physical therapy services to address stated impairments and improve functional mobility.    Plan  Treatment/Interventions: Cryotherapy, Education/ Instruction, Electrical stimulation, Gait training, Hot pack, Therapeutic activities, Therapeutic exercises, Self care/ home management, Neuromuscular re-education, Manual therapy  PT Plan: Skilled PT  PT Frequency: 2 times per week  Duration: 10-12 weeks  Onset Date: 02/19/25  Certification Period Start Date: 03/20/25  Certification Period End Date: 06/18/25  Number of Treatments Authorized: 30 (1 used)  Rehab Potential: Good  Plan of Care Agreement: Patient    Primary diagnosis: Acute L knee pain  Current Problem  1. Acute pain of left knee  Follow Up In Physical Therapy      2. Unilateral primary osteoarthritis, left knee  Referral to Physical Therapy    Follow Up In Physical Therapy          General:  General  Reason  for Referral: L TKA  Referred By: Renetta Genao  Past Medical History Relevant to Rehab: L knee work injury in 2011, had an arthroscopic procedure at some point  Preferred Learning Style: verbal, visual, written  Precautions:  Precautions  STEADI Fall Risk Score (The score of 4 or more indicates an increased risk of falling): 1  Precautions Comment: Diabetes, HBP, asthma, heart disease, hx of TIA, baker's cyst  Vital Signs:       Subjective:  Chief complaints: L TKA, spent one night in the hospital; can move around short distances without a cane  Onset/Surgery Date: 2/19/25  Mechanism of Injury: 2011 injury at work  Previous History: L knee injury and scope  Personal Factors that may impact care: Diabetes, HBP, asthma, heart disease, hx of TIA, baker's cyst     Pain:  Current: 0/10 Best: 0/10 Worst: 10/10   Location: L knee/thigh   Type: discomfort   Aggravators: Night time, walk/standing a lot   Alleviators: Tylenol   Numbness/tingling? No    Function:   Work/Recreation: , off work until 5/13/25   Prior Level: Needs to be able to stand on feet for 10 hours, kneeling, lifting; no A.D. at baseline   Current limitations: Walking, standing, stairs, sleeping   Condition: Improving     Home Situation: house   Stairs: yes, one rail to left    Lives with someone   No concerns about home set up    Any falls in the past year: No     Injuries? N/a    Fear of falling? No    Sleep:    Getting to sleep Can't get comfortable   Disturbed Yes   Preferred position(s): side sleeper     Goals for Therapy:    Steps normally    Objective   Palpation/Observation: Incision on L knee healing well with mild scabbing; a little warmth to palpation, normal for this stage    ROM/Flexibility:    Knee Flexion R / L :      120 / 106    Knee Extension R / L :  5 / (1)        Girth Measurements/Swelling :    Mid patellar R / L : 42.5 cm / 46 cm    Around malleoli R / L : 0.5 cm bigger on L     Strength R / L :    Hip Flexion:     5 /  4    Hip Extension:     5 / 4    Hip Abduction:    5 / 4-    Hip Adduction:    5 / 4    Knee Extension:   5 / 4-    Knee Flexion:       5 / 4    Ankle DF:             5 / 4+    Ankle PF:              5 / 4     Neurological: Pt endorses slight numbness throughout LLE     Gait: Can amb without SPC, demo's decreased L hip extension and stance time, mild L knee flexion during stance     Outcome Measure:    LEFS : 25 / 80      Treatment:  Heel slides w/strap x 15  Quad set 3 sec x 15, knee  Quad set 3 sec x 10, ankle  SAQ 3 sec x 15  SLR x 10  Sidelying hip abd x 10  LAQ 3 sec x 15    Ice to L knee w/LLE elevated on pillow x 8 min      Goals:  Active       PT Problem       Pt will increase L knee AROm to 0-120 to facilitate improved gait mechanics.        Start:  03/20/25    Expected End:  04/19/25            Pt will amb in clinic independently without gait deviations to indicate improved pain, motion, and strength in LLE.        Start:  03/20/25    Expected End:  04/19/25            Pt will negotiate one flight of steps reciprocally to facilitate improved mobility in home and indicate improved LLE strength.        Start:  03/20/25    Expected End:  04/19/25            The patient will improve score on LEFS by 20 points to indicate decreased pain and increased functional level.         Start:  03/20/25    Expected End:  05/19/25            Patient will be independent with home exercise program for home maintenance.        Start:  03/20/25    Expected End:  05/19/25

## 2025-03-21 NOTE — PROGRESS NOTES
"Per Radha Fisher, patient's insurance coverage is indicated as \"out of network\" for current referral location. Therefore, [facility] therapy plan with  [facility] discontinued under protocol with Dr. Hodge. Provider signed referral form for [facility]. Sent via fax.  "

## 2025-03-24 PROBLEM — R91.8 MULTIPLE PULMONARY NODULES: Status: ACTIVE | Noted: 2025-03-24

## 2025-03-24 PROBLEM — Z86.79 HISTORY OF HYPERTENSION: Status: ACTIVE | Noted: 2025-03-24

## 2025-03-24 RX ORDER — SERTRALINE HYDROCHLORIDE 25 MG/1
25 TABLET, FILM COATED ORAL DAILY
COMMUNITY

## 2025-03-24 RX ORDER — ASPIRIN 325 MG
TABLET, DELAYED RELEASE (ENTERIC COATED) ORAL
COMMUNITY

## 2025-03-27 ENCOUNTER — APPOINTMENT (OUTPATIENT)
Dept: INFUSION THERAPY | Facility: CLINIC | Age: 55
End: 2025-03-27
Payer: COMMERCIAL

## 2025-03-27 ENCOUNTER — TREATMENT (OUTPATIENT)
Dept: PHYSICAL THERAPY | Facility: CLINIC | Age: 55
End: 2025-03-27
Payer: COMMERCIAL

## 2025-03-27 DIAGNOSIS — M25.562 ACUTE PAIN OF LEFT KNEE: ICD-10-CM

## 2025-03-27 DIAGNOSIS — M17.12 UNILATERAL PRIMARY OSTEOARTHRITIS, LEFT KNEE: ICD-10-CM

## 2025-03-27 PROCEDURE — 97110 THERAPEUTIC EXERCISES: CPT | Mod: GP

## 2025-03-27 NOTE — PROGRESS NOTES
"Physical Therapy    Physical Therapy Treatment    Patient Name: Betzaida Jc  MRN: [id number]  Today's Date: 3/27/2025    Time Entry:   Time Calculation  Start Time: 1304  Stop Time: 1355  Time Calculation (min): 51 min     PT Therapeutic Procedures Time Entry  Therapeutic Exercise Time Entry: 34  PT Modalities Time Entry  Hot/Cold Pack Time Entry: 10           Non-Billable Time  Non-billable co-treatment time: 7    Assessment:   Pt did well with treatment today, but feels most discomfort working on extension based activities likely due to bakers cyst.     Plan:   LP, cable column, SRB as able    Current Problem  1. Acute pain of left knee  Follow Up In Physical Therapy      2. Unilateral primary osteoarthritis, left knee  Follow Up In Physical Therapy          Subjective    \"I had another appointment today so it's a little sore\"  Precautions   Diabetes, HBP, asthma, heart disease, hx of TIA, baker's cyst   Pain   4/10    Objective   L knee ROM: 108 - 2` initially  Titanium rods in B great toes      Treatments:  Therapeutic Exercise:  Heel slides w/strap x 20 L  DKTC/HS curl on pball x 20  Quad set 3 sec x 20, knee L  Quad set 3 sec x 10, ankle L  SAQ 1# 3 sec x 15 L   SLR 1# x 10 L   Sidelying hip abd 1# x 7- soreness in back of the knee  LAQ 3 sec x 15 L  6\" step up x 10 L  4\" lateral step up x 10 L  Slant board stretch 10 sec x 10  Seated HS stretch 10 sec x 10    Ice to L knee w/LLE elevated on pillow x 10 min      Goals:  Active       PT Problem       Pt will increase L knee AROm to 0-120 to facilitate improved gait mechanics.        Start:  03/20/25    Expected End:  04/19/25            Pt will amb in clinic independently without gait deviations to indicate improved pain, motion, and strength in LLE.        Start:  03/20/25    Expected End:  04/19/25            Pt will negotiate one flight of steps reciprocally to facilitate improved mobility in home and indicate improved LLE strength.        Start:  " 03/20/25    Expected End:  04/19/25            The patient will improve score on LEFS by 20 points to indicate decreased pain and increased functional level.         Start:  03/20/25    Expected End:  05/19/25            Patient will be independent with home exercise program for home maintenance.        Start:  03/20/25    Expected End:  05/19/25

## 2025-03-28 ENCOUNTER — APPOINTMENT (OUTPATIENT)
Dept: PULMONOLOGY | Facility: CLINIC | Age: 55
End: 2025-03-28
Payer: COMMERCIAL

## 2025-03-28 ENCOUNTER — OFFICE VISIT (OUTPATIENT)
Dept: SLEEP MEDICINE | Facility: CLINIC | Age: 55
End: 2025-03-28
Payer: COMMERCIAL

## 2025-03-28 VITALS
SYSTOLIC BLOOD PRESSURE: 151 MMHG | OXYGEN SATURATION: 97 % | DIASTOLIC BLOOD PRESSURE: 101 MMHG | BODY MASS INDEX: 35.36 KG/M2 | WEIGHT: 220 LBS | HEART RATE: 81 BPM | HEIGHT: 66 IN

## 2025-03-28 DIAGNOSIS — G47.33 OSA (OBSTRUCTIVE SLEEP APNEA): Primary | ICD-10-CM

## 2025-03-28 PROCEDURE — 4010F ACE/ARB THERAPY RXD/TAKEN: CPT | Performed by: NURSE PRACTITIONER

## 2025-03-28 PROCEDURE — 3080F DIAST BP >= 90 MM HG: CPT | Performed by: NURSE PRACTITIONER

## 2025-03-28 PROCEDURE — 3077F SYST BP >= 140 MM HG: CPT | Performed by: NURSE PRACTITIONER

## 2025-03-28 PROCEDURE — 3044F HG A1C LEVEL LT 7.0%: CPT | Performed by: NURSE PRACTITIONER

## 2025-03-28 PROCEDURE — 3008F BODY MASS INDEX DOCD: CPT | Performed by: NURSE PRACTITIONER

## 2025-03-28 PROCEDURE — 99214 OFFICE O/P EST MOD 30 MIN: CPT | Performed by: NURSE PRACTITIONER

## 2025-03-28 PROCEDURE — 1036F TOBACCO NON-USER: CPT | Performed by: NURSE PRACTITIONER

## 2025-03-28 ASSESSMENT — SLEEP AND FATIGUE QUESTIONNAIRES
DIFFICULTY_FALLING_ASLEEP: SEVERE
WAKING_TOO_EARLY: SEVERE
HOW LIKELY ARE YOU TO NOD OFF OR FALL ASLEEP WHILE WATCHING TV: MODERATE CHANCE OF DOZING
DIFFICULTY_STAYING_ASLEEP: VERY SEVERE
HOW LIKELY ARE YOU TO NOD OFF OR FALL ASLEEP IN A CAR, WHILE STOPPED FOR A FEW MINUTES IN TRAFFIC: WOULD NEVER DOZE
SLEEP_PROBLEM_NOTICEABLE_TO_OTHERS: SOMEWHAT
HOW LIKELY ARE YOU TO NOD OFF OR FALL ASLEEP WHILE SITTING QUIETLY AFTER LUNCH WITHOUT ALCOHOL: WOULD NEVER DOZE
ESS-CHAD TOTAL SCORE: 5
HOW LIKELY ARE YOU TO NOD OFF OR FALL ASLEEP WHEN YOU ARE A PASSENGER IN A CAR FOR AN HOUR WITHOUT A BREAK: WOULD NEVER DOZE
HOW LIKELY ARE YOU TO NOD OFF OR FALL ASLEEP WHILE SITTING AND TALKING TO SOMEONE: WOULD NEVER DOZE
HOW LIKELY ARE YOU TO NOD OFF OR FALL ASLEEP WHILE LYING DOWN TO REST IN THE AFTERNOON WHEN CIRCUMSTANCES PERMIT: SLIGHT CHANCE OF DOZING
WORRIED_DISTRESSED_DUE_TO_SLEEP: VERY MUCH NOTICEABLE
SITING INACTIVE IN A PUBLIC PLACE LIKE A CLASS ROOM OR A MOVIE THEATER: WOULD NEVER DOZE
SATISFACTION_WITH_CURRENT_SLEEP_PATTERN: VERY DISSATISFIED
SLEEP_PROBLEM_INTERFERES_DAILY_ACTIVITIES: VERY MUCH NOTICEABLE
HOW LIKELY ARE YOU TO NOD OFF OR FALL ASLEEP WHILE SITTING AND READING: MODERATE CHANCE OF DOZING

## 2025-03-28 ASSESSMENT — PATIENT HEALTH QUESTIONNAIRE - PHQ9
2. FEELING DOWN, DEPRESSED OR HOPELESS: NOT AT ALL
SUM OF ALL RESPONSES TO PHQ9 QUESTIONS 1 AND 2: 0
1. LITTLE INTEREST OR PLEASURE IN DOING THINGS: NOT AT ALL

## 2025-03-28 ASSESSMENT — ENCOUNTER SYMPTOMS
OCCASIONAL FEELINGS OF UNSTEADINESS: 0
DEPRESSION: 0
LOSS OF SENSATION IN FEET: 0

## 2025-03-28 ASSESSMENT — PAIN SCALES - GENERAL: PAINLEVEL_OUTOF10: 10-WORST PAIN EVER

## 2025-03-28 NOTE — PATIENT INSTRUCTIONS
[facility] Sleep Medicine  [facility] [address]  [facility]  [address]  [facility] [address]       NAME: Betzaida Jc   DATE:  03/28/25    DIAGNOSIS:   1. BRIDGER (obstructive sleep apnea)  In-Center Sleep Study (Sleep Provider Only)    CANCELED: In-Center Sleep Study (Sleep Provider Only)          Thank you for coming to the Sleep Medicine Clinic today! Your sleep medicine provider today was: Mila Goodman, SONALI-CNP Below is a summary of your treatment plan, other important information, and our contact numbers:    TREATMENT PLAN:   - Follow-up in 3-4 months.  - If not already done, sign up for 'My Chart' and send prescription requests or messages through this    Scheduling a Sleep Study    Call the  Sleep Testing Center to speak with a sleep testing  to book your overnight sleep study procedure at one of our adult and pediatric-friendly sleep labs. Overnight sleep studies may be scheduled on a weekday or weekend.    We have child life services on a case by case basis at the [facility] location. We also perform daytime testing for shift workers on a case by case basis.    Locations for sleep studies are: [facility], [facility] ([facility]), [facility], [facility], [place], [place].    Bring your usual medications and nightly routine items for your sleep study. In order to fall asleep faster in sleep lab, we advise patients to wake up earlier on the morning of the scheduled testing and avoid napping prior to testing. Sometimes, your provider may prescribe a sleep aid to be taken at lights out in the sleep lab. If you are taking a sleep aid, please have somebody pick you up after the sleep testing.    Results of your sleep study will be given to the ordering clinician. Please contact their office for results or follow up as directed by your clinician.    For additional information about the sleep medicine  services, please call [phone]       Obstructive sleep apnea (BRIDGER): BRIDGER is a sleep disorder where your upper airway muscles relax during sleep and the airway intermittently and repetitively narrows and collapses leading to blocked airway (apnea) which, in turn, can disrupt breathing in sleep, lower oxygen levels while you sleep and cause night time wakings. Because apnea may cause higher carbon dioxide or low oxygen levels, untreated BRIDGER can lead to heart arrhythmia, elevation of blood pressure, and make it harder for the body to consolidate memory and metabolize (leading to higher blood sugars at night).   Frequent partial arousals occur during sleep resulting in sleep deprivation and daytime sleepiness. BRIDGER is associated with an increased risk of cardiovascular disease, stroke, hypertension, and insulin resistance. Moreover, untreated BRIDGER with excessive daytime sleepiness can increase the risk of motor vehicular accidents.    Some conservative strategies for BRIDGER are:   Positional therapy - Avoid sleeping on your back.   Healthy diet, exercise, and optimizing weight encouraged.   Avoid alcohol late in the evening as it can make sleep apnea worse.     Safety: Avoid driving and operating heavy equipment while sleepy. Drowsy driving may lead to life-threatening motor vehicle accidents.     Common treatment options for sleep apnea include weight management, positional therapy, Positive Airway Therapy (PAP) therapy, oral appliance therapy, hypoglossal nerve stimulation, and select airway surgeries.     Instructions - Common BRIDGER Recs: - For your sleep apnea, continue to use your PAP every night and use it whenever you are sleeping.   - Avoid alcohol or sedatives several hours prior to sleeping.   - Get additional supplies for your PAP (e.g., mask, hose, filters) every 3 months or as your insurance allows from your [employer] company. Replacement cushions for your PAP mask can be requested monthly if airseals are an  issue.  - Remember to clean your mask, tubings, and water chamber regularly as instructed.  - Avoid driving or operating heavy machinery when drowsy. A person driving while sleepy is five (5) times more likely to have an accident. If you feel sleepy, pull over and take a short power nap (sleep for less than 30 minutes). Otherwise, ask somebody to drive you.    EASY WAYS TO IMPROVE YOUR SLEEP:  1. Go to bed and wake up at the same time every day.   Aim for 8 hours but some people need less, some need more.   Get out of bed if you are not sleeping.   Limit naps to 20 min or less.   2. Expose yourself to daylight and/ or bright light in the morning.   Go outside or spend time near a window each morning.   You can use a light box (found on Amazon) if you wake before the sunrise.   Limit light exposure in the evenings (including electronic usage).   Try meditation, reading, stretching, deep breathing, warm shower or bath, or yoga nidra as part of your bedtime routine. There are many great FREE, videos or audio tracks on Kalido/ logolineup, etc for guidance.  3. Exercise, in some form, EVERY day, but not too close to bedtime. Consider making this part of your routine at the start of your day, followed by a cool shower.  4. Eat meals at roughly the same time every day. Make sure you are prioritizing fruits, vegetables, whole grains, lean proteins.  5. Time your caffeine intake. Make sure you are not drinking caffeine within 8 to 12 hours prior to your bedtime.   6. Avoid marijuana, alcohol, and nicotine. They will reduce sleep quality in any quantity.  7. Learn to manage anxiety. Psychology services at  can be reached at [phone] to schedule an appointment.     IMPORTANT INFORMATION:     Call 911 for medical emergencies.  Our offices are generally open from Monday-Friday, 9 am - 5 pm.  If you need to get in touch with me, you may either call me and my team(number is below) or you can use [employer].  If a referral for a  test, for CPAP, or for another specialist was made, and you have not heard about scheduling this within a week, please call scheduling at [phone]).  If you are unable to make your appointment for clinic or an overnight study, kindly call the office at least 48 hours in advance to cancel and reschedule.  If you are on CPAP, please bring your device's card to each clinic appointment unless told otherwise by your provider.  There are no supporting services by either the sleep doctors or their staff on weekends and Holidays, or after 5 PM on weekdays.   If you have been asked to come to a sleep study, make sure you bring toiletries, a comfy pillow, and any nighttime medications that you may regularly take. Also be sure to eat dinner before you arrive. We generally do not provide meals.      PRESCRIPTIONS:  We require 7 days advanced notice for prescription refills. If we do not receive the request in this time, we cannot guarantee that your medication will be refilled in time. Please contact the sleep nurses listed below for refills or request via [employer].     IMPORTANT PHONE NUMBERS:   Sleep Medicine Clinic Fax: [phone]  Appointments (for Pediatric Sleep Clinic): [phone] - option 1  Appointments (for Adult Sleep Clinic): [phone] - option 2  Appointments (For Sleep Studies): [phone] - option 3  Behavioral Sleep Medicine: [phone]  Sleep Surgery: [phone]  ENT (Otolaryngology): [phone]  Headache Clinic (Neurology): [phone]  Neurology: [phone]  Psychiatry: [phone]  Pulmonary Function Testing (PFT) Center: [phone]  Pulmonary Medicine: [phone]  [employer] (DME): [phone]  [employer] (DME): [phone]  [facility] ([facility]): [phone]    Our Adult Sleep Medicine Team (Please do not hesitate to call the office or sleep nurse with any questions between appointments):    Adult Sleep Nurses (Meghan Salguero  RN and Karoline Gama RN):  For clinical questions and refilling prescriptions: [phone]  Email sleep diaries and other documents at: [email or web address]    Adult Sleep Medicine Secretaries:  Kmiberly Montes (For Miguel/Rendon/Krise/Strohl/Yeh): P: [phone]  F: [phone]  Terrell Smith ([place])Office: [phone] option 4 Office Fax: [phone]  Crista Bowman (For Hill): P: [phone]  Fax: [phone]  Marleen De La Cruz (For Jurcevic/Blank): P: [phone]  F: [phone]  Lexi Javier (For [place]): P: [phone]  F: [phone]  Wilma Regalado (For Deb/Lamine/Zakhary): P: [phone]  F: [phone]  Cande Roach (For Schuyler/Rios): P: [phone]  F: [phone]     Adult Sleep Medicine Advanced Practice Providers:  Irwin Bridges ([place], [place])  Latoya Raman ([facility])  Mila Goodman CNP ([place], [place], [facility])  Cecille Eldridge CNP ([place], [place], [facility])  Blanca Chapman ([facility], [facility])  Dax Rios CNP ([facility])      Our Sleep Testing Center (STC) Locations:  Our team will contact you to schedule your sleep study, however, you can contact us as follow:  Main Phone Line (scheduling only): [phone]), option 3  Adult and Pediatric Locations  [facility] (6 years and older): [facility] - 4th floor ([address] [place] [address]) After hours line: [phone]  [facility] (Main campus: All ages): [place], 6th floor. After hours line: [phone]  [facility] [place] (5 years and older; younger considered on case-by-case basis): [address]; [address], [address]. Scheduling  After hours line: [phone]   [place] (6 years and older): [address]; [address]; [address]   [place] (6 years and older): [address] [facility], [address]  After hours line: [phone]   [place] (13 years and older) in  "[place]: [address], 2nd floor  After hours line: [phone]   [place] (13 year and older): [address]  After hours line: [phone] (Home studies out of [facility])    Adult Only Locations:   [place] (18 years and older): [address] [facility], 2nd floor   [place] (18 years and older): [address] [facility]; 4th floor  After hours line: [phone]  [facility] (18 years and older) at [place]: [address] [place] [address]  After hours line: [phone]        CONTACTING YOUR SLEEP MEDICINE PROVIDER:  Send a message directly to your provider through \"My Chart\", which is the email service through your  Records Account: [email or web address]   Call [phone] and leave a message. One of the administrative assistants will forward the message to your sleep medicine provider through \"My Chart\" and/or email.     Your sleep medicine provider for this visit was: Mila Goodman, APRN-CNP    In the event that you are running more than 15 minutes late to your appointment, I will kindly ask you to reschedule.       "

## 2025-03-28 NOTE — PROGRESS NOTES
Patient: Betzaida Jc    [id number]  : 1970 -- AGE 55 y.o.    Provider: HAMIDA Blanton     Location [facility]   Service Date: 3/28/2025              [facility] Sleep Medicine Clinic  Follow Up  Visit Note      HISTORY OF PRESENT ILLNESS     HISTORY OF PRESENT ILLNESS   Betzaida Jc is a 55 y.o. female who presents to a [facility] Sleep Medicine Clinic for a sleep medicine evaluation with concerns of BRIDGER. She works at [employer].    The patient has h/o atrial fibrillation, HTN, DVT, PE, obesity, GERD, hernia, arthritis, TIA, asthma, BRIDGER.    Past Sleep History  Patient has had several sleep studies in the past. Last was completed in  split night sleep study at [facility] per Dr. Dorsey. AHI was 68.6 with and SpO2 christo of 86%. CPAP was titrated from 5 to 10 cwp with recommendation for CPAP at 9 cwp.     States she was set up with CPAP at that time and has work consistently since. Notes oxygen bleed in was added some time last year per Dr. Dorsey for unclear reasons. [facility]     Current History    On today's visit, the patient reports recently meeting Dr. Hodge who recommended BRIDGER reevaluation. States she likely would benefit from repeat sleep study as she probably does not need a bleed in anymore. She is willing to repeat sleep testing. She is not sure why oxygen was ordered. Notes she has lost approximately 40 lb since the start of the year. Hoping to lose a little bit more. She is taking metformin and Mounjaro. Notes she has little appetite.   She sometimes does not take her CPAP when she travels. Wears nasal mask. Asking if there is any other option to use. Asking if she would be a candidate for Inspire. Does not wear her oxygen bleed in consistently. Supposed to run at 3L/min. States her oxygen levels on her watch are tracking well unless she lays flat in her bed. Typically sleeps with her head elevated.        Has not been able to schedule sleep study. States she called every week for a while and was waitlisted. Notes CPAP gives her headaches now when she wears it. Forehead area. Dissipates when she takes it off. Notes she is not snoring like she used to. Sent oxygen back as she did not feel she needed it.   Did not like the nasal pillow mask I sent last visit  Still interested in repeat testing. Asking if there are other labs she could consider.   In rehab for knee replacement done in Feb. Off work for a while yet  Has not been sleeping well, attributes some to pain from knee    Sleep schedule  on weekdays / work days:  Usual Bedtime:    8 PM  Falls asleep around:  8:30 PM  # of awakenings: several bathroom trips   Wake time:  3 AM     Naps: after work occasionally    Preferred sleeping position: side     Sleep-related ROS:    Problems going to sleep: No problems going to sleep    Problems staying asleep Problems Staying Asleep: nocturia, breathing problems, palpitations, and environment    Breathing during sleep: snoring, witnessed apneas, gasping/choking for air, night sweats, wake with palpitations, and nocturnal reflux symptoms    Daytime Symptoms  On awakening patient reports: wake unrefreshed, morning headaches, and morning dry mouth    RLS screen:  RLSSCREEN: - Sensations: Patient has unusual sensations in their extremities that cause an urge to move them   - Frequency: frequently   - Relief: Symptoms ARE/ARENOT: are relieved with movement   - Circadian: Symptoms ARE/ARENOT: are not typically improved later in the night.   - Movement: Patient has not been told that their legs kick or jerk during sleep    Feels she needs to stretch her legs. Notes she is on her feet all day at work an needs knee replacements. Hoping to have left knee done if she can lose a little more weight.     Sleep-related behaviors:   dreams upon falling asleep  Restless sleep     Fatigue: symptoms bothersome, but easily able to carry  out all usual work/school/family activities    ESS: 5   PRATIK: 24  FOSQ:  40      REVIEW OF SYSTEMS     REVIEW OF SYSTEMS  Review of Systems   All other systems reviewed and are negative.        ALLERGIES AND MEDICATIONS     ALLERGIES  Allergies   Allergen Reactions    Penicillins Other, Shortness of breath and Unknown     asthma    Sulfa (Sulfonamide Antibiotics) Other     Asthma        MEDICATIONS  Current Outpatient Medications   Medication Sig Dispense Refill    albuterol 90 mcg/actuation inhaler every 4 hours if needed.      apixaban (Eliquis) 5 mg tablet Take 1 tablet (5 mg) by mouth twice a day.      atorvastatin (Lipitor) 40 mg tablet Take 1 tablet (40 mg) by mouth once daily.      budesonide-formoteroL (Symbicort) 80-4.5 mcg/actuation inhaler Inhale 2 puffs 2 times a day as needed (shortness of breath). Rinse mouth with water after use to reduce aftertaste and incidence of candidiasis. Do not swallow. 10.2 g 5    cholecalciferol (Vitamin D-3) 1.25 mg (50,000 units) capsule Take by mouth.      flecainide (Tambocor) 100 mg tablet Take 1 tablet (100 mg) by mouth 2 times a day.      linaCLOtide (Linzess) 145 mcg capsule Take 1 tablet by mouth once daily as needed.      lisinopril 40 mg tablet Take 1 tablet (40 mg) by mouth once daily.      meclizine (Antivert) 25 mg tablet Take 1 tablet (25 mg) by mouth every 12 hours if needed.      metFORMIN (Glucophage) 500 mg tablet TAKE 1 TABLET BY MOUTH TWICE A DAY WITH A MEAL FOR 30 DAYS      metoprolol tartrate (Lopressor) 100 mg tablet Take 1 tablet (100 mg) by mouth 2 times a day.      montelukast (Singulair) 10 mg tablet TAKE 1 TABLET (10 MG) BY MOUTH ONCE EVERY 24 HOURS.      omeprazole (PriLOSEC) 40 mg DR capsule 1 capsule (40 mg) once every 24 hours.      ondansetron (Zofran) 4 mg tablet Take 1 tablet (4 mg) by mouth every 6 hours if needed for nausea or vomiting.      OneTouch Delica Plus Lancet 33 gauge misc USE 1 LANCET TO TEST BLOOD SUGAR THREE TIMES DAILY       "OneTouch Verio Flex meter misc TEST BLOOD SUGAR ONCE DAILY      OneTouch Verio test strips strip USE TO TEST BLOOD SUGAR ONCE DAILY      sertraline (Zoloft) 25 mg tablet Take 1 tablet (25 mg) by mouth once daily.      torsemide (Demadex) 20 mg tablet Take 1 tablet (20 mg) by mouth once daily.      traMADol (Ultram) 50 mg tablet Take 1 tablet (50 mg) by mouth every 6 hours if needed for severe pain (7 - 10) for up to 7 days. 28 tablet 0    Alcohol Prep Pads pads, medicated USE TO TEST BLOOD SUGAR ONCE DAILY      apixaban (Eliquis) 2.5 mg tablet Take 1 tablet (2.5 mg) by mouth 2 times a day for 5 days. After 5 days, resume regular home dose. 10 tablet 0    benralizumab (Fasenra) 30 mg/mL injection Inject 1 Syringe (30 mg) under the skin every 8 (eight) weeks. 1 each 5    benralizumab (Fasenra) 30 mg/mL injection Inject 1 mL (30 mg) under the skin every 8 (eight) weeks. 1 mL 5    diphenhydrAMINE (BENADryl) 25 mg capsule Take 1-2 capsules (25-50 mg) by mouth every 4 hours if needed.      pantoprazole (ProtoNix) 40 mg EC tablet Take 1 tablet (40 mg) by mouth once daily. Do not crush, chew, or split. 30 tablet 0    polyethylene glycol (Glycolax, Miralax) 17 gram/dose powder Mix 1 cap (17g) of powder into 8 ounces of fluid and drink once daily. 510 g 0     No current facility-administered medications for this visit.         PAST HISTORY     PAST MEDICAL HISTORY  Past Medical History:   Diagnosis Date    Asthma     \"Severe persistent asthma\" per pulmonology    Chest pain     Numerous work ups for CP.    Chronic allergic rhinitis     Chronic constipation     Collapse of right lung     Chronic RML collapse with associated bronchiectasis, seen on CT    Dependence on nocturnal oxygen therapy     2-3 L O2 nightly    Diverticulosis     Hiatal hernia with GERD     4cm per EGD 8/2023    History of venous thromboembolism 06/2010    6-8 weeks post L Achilles repair, LE DVT and unilateral PE. Treated w/ anticoagulation, then found to " have possible PE in other lung    Hyperlipidemia     Hypertension     Irregular heart beat     Migraine     Morbid obesity with BMI of 45.0-49.9, adult (Multi)     BRIDGER (obstructive sleep apnea)     partially CPAP compliant w/ 2-3L O2 bleed in    Paroxysmal atrial fibrillation (Multi)     Right renal stone     6 mm stone midpole caliceal system right kidney without obstruction, last seen 2023    Type 2 diabetes mellitus        PAST SURGICAL HISTORY:  Past Surgical History:   Procedure Laterality Date    ACHILLES TENDON SURGERY Left 2010    BI BREAST RIGHT CORE NEEDLE BIOPSY       SECTION, CLASSIC      COLONOSCOPY      CT ANGIO CORONARY ART WITH HEARTFLOW IF SCORE >30%  2022    CT HEART CORONARY ANGIOGRAM 2022 DOCTOR OFFICE LEGACY    ESOPHAGOSCOPY / EGD  2023    KNEE ARTHROSCOPY W/ DEBRIDEMENT Right     MR HEAD ANGIO WO IV CONTRAST  2019    MR HEAD ANGIO WO IV CONTRAST 2019 [facility] ANCILLARY LEGACY    MR NECK ANGIO WO IV CONTRAST  2019    MR NECK ANGIO WO IV CONTRAST 2019 [facility] ANCILLARY LEGACY    PARTIAL HYSTERECTOMY  2006    fibroids/bleeding    SHOULDER ARTHROSCOPY W/ ROTATOR CUFF REPAIR Left 2020    TOE SURGERY Right 2023    Digital arthroplasties with pin fixation, toes 2, 3, right foot.       FAMILY HISTORY  Family History   Problem Relation Name Age of Onset    Thyroid cancer Mother      Kidney cancer Father      Diabetes Other      Hypertension Other      Stroke Other         SOCIAL HISTORY  She  reports that she has never smoked. She has never used smokeless tobacco. She reports current alcohol use of about 2.0 standard drinks of alcohol per week. She reports that she does not use drugs. She currently lives alone.     Caffeine consumption:   Alcohol consumption: occasionally  Smoking: none  Marijuana:     PHYSICAL EXAM     VITAL SIGNS: BP (!) 151/101 (BP Location: Left arm, Patient Position: Sitting, BP Cuff Size: Large adult) Comment: Took bp  "meds this morning  Pulse 81   Ht 1.676 m (5' 6\")   Wt 99.8 kg (220 lb)   SpO2 97%   BMI 35.51 kg/m²      PREVIOUS WEIGHTS:  Wt Readings from Last 3 Encounters:   03/28/25 99.8 kg (220 lb)   02/22/25 103 kg (227 lb)   02/21/25 103 kg (227 lb)       Physical Exam  Physical Exam   Constitutional: Alert and oriented, cooperative, no obvious distress   HEENT: Non icteric or anemic, EOM WNL bilaterally   Neck: Supple, no JVD, no goiter, no adenopathy, no rigidity  Chest: CTA bilaterally, no wheezing, crackles, rubs   Cardiac: RRR, S1 and S2, no murmur, rub, thrill   Abdomen: Obese, Soft, nontender, no masses, no organomegaly   Extremities: No clubbing, no LL edema   Neuromuscular: Cranial nerves grossly intact, no focal deficits      RESULTS/DATA     Bicarbonate (mmol/L)   Date Value   02/22/2025 28   02/20/2025 23   02/03/2025 33 (H)       No results found for this or any previous visit from the past 365 days.       Failed to redirect to the Timeline version of the kwiry SmartLink.        ASSESSMENT/PLAN     Ms. Jc is a 55 y.o. female and She was referred to the [facility] Clinic for evaluation of BRIDGER    Problem List and Orders  Problem List Items Addressed This Visit             ICD-10-CM    BRIDGER (obstructive sleep apnea) - Primary G47.33     Severe per last sleep study at [facility] in 2021  On CPAP per [facility]. No download to review today. Requesting Karoline call [facility] to send one over.  Also wears 3L/min bleed in. Unclear of reason for addition of bleed in, possibly asthma or afib symptoms. - Notes she sent this one back   -Will repeat sleep study with split night protocol given weight loss and oxygen desaturations tracked on apple watch and determine current BRIDGER severity, current air pressure needs on CPAP and oxygen bleed in needs. She is agreeable. A home sleep study would not be sufficient evaluation in her case due to complexity of respiratory equipment needs and determination of " appropriate treatment settings. She already has a diagnosis of BRIDGER established, which a HSAT would only prove to re-establish.   -Plan for follow up in August after testing completed. Will call her when download received if immediate adjustments are determined to be needed. She is agreeable   -Requesting [employer] send N30i instead of current traditional nasal. She is interested in smaller interface.  -Discussed Inspire and BMI limitations. She hopes to lose weight.          Relevant Orders    In-Center Sleep Study (Sleep Provider Only)            RTC in August

## 2025-03-28 NOTE — ASSESSMENT & PLAN NOTE
Severe per last sleep study at [facility] in 2021  On CPAP per [facility]. No download to review today. Requesting Karoline call [facility] to send one over.  Also wears 3L/min bleed in. Unclear of reason for addition of bleed in, possibly asthma or afib symptoms. - Notes she sent this one back   -Will repeat sleep study with split night protocol given weight loss and oxygen desaturations tracked on apple watch and determine current BRIDGER severity, current air pressure needs on CPAP and oxygen bleed in needs. She is agreeable. A home sleep study would not be sufficient evaluation in her case due to complexity of respiratory equipment needs and determination of appropriate treatment settings. She already has a diagnosis of BRIDGER established, which a HSAT would only prove to re-establish.   -Plan for follow up in August after testing completed. Will call her when download received if immediate adjustments are determined to be needed. She is agreeable   -Requesting [facility] send N30i instead of current traditional nasal. She is interested in smaller interface.  -Discussed Inspire and BMI limitations. She hopes to lose weight.

## 2025-04-01 ENCOUNTER — APPOINTMENT (OUTPATIENT)
Dept: PHYSICAL THERAPY | Facility: CLINIC | Age: 55
End: 2025-04-01
Payer: COMMERCIAL

## 2025-04-01 NOTE — PROGRESS NOTES
"Physical Therapy    Physical Therapy Treatment    Patient Name: Betzaida Jc  MRN: [id number]  Today's Date: 4/1/2025    Time Entry:                             Assessment:   Pt did well with treatment today, but feels most discomfort working on extension based activities likely due to bakers cyst.     Plan:   LP, cable column, SRB as able    Current Problem  No diagnosis found.      Subjective    \"I had another appointment today so it's a little sore\"  Precautions   Diabetes, HBP, asthma, heart disease, hx of TIA, baker's cyst   Pain   4/10    Objective   L knee ROM: 108 - 2` initially  Titanium rods in B great toes      Treatments:  Therapeutic Exercise:  Heel slides w/strap x 20 L  DKTC/HS curl on pball x 20  Quad set 3 sec x 20, knee L  Quad set 3 sec x 10, ankle L  SAQ 1# 3 sec x 15 L   SLR 1# x 10 L   Sidelying hip abd 1# x 7- soreness in back of the knee  LAQ 3 sec x 15 L  6\" step up x 10 L  4\" lateral step up x 10 L  Slant board stretch 10 sec x 10  Seated HS stretch 10 sec x 10    Ice to L knee w/LLE elevated on pillow x 10 min      Goals:  Active       PT Problem       Pt will increase L knee AROm to 0-120 to facilitate improved gait mechanics.        Start:  03/20/25    Expected End:  04/19/25            Pt will amb in clinic independently without gait deviations to indicate improved pain, motion, and strength in LLE.        Start:  03/20/25    Expected End:  04/19/25            Pt will negotiate one flight of steps reciprocally to facilitate improved mobility in home and indicate improved LLE strength.        Start:  03/20/25    Expected End:  04/19/25            The patient will improve score on LEFS by 20 points to indicate decreased pain and increased functional level.         Start:  03/20/25    Expected End:  05/19/25            Patient will be independent with home exercise program for home maintenance.        Start:  03/20/25    Expected End:  05/19/25                "

## 2025-04-02 NOTE — PROGRESS NOTES
Voicemail received from [facility] stating they could not see patient due to insurance. Federica LORD at  [facility] advised to send Fasenra referral to [facility] as prospective in-network opportunity. Form filled out and faxed following signature by Dr. Hodge.

## 2025-04-03 ENCOUNTER — HOSPITAL ENCOUNTER (OUTPATIENT)
Dept: RADIOLOGY | Facility: CLINIC | Age: 55
Discharge: HOME | End: 2025-04-03
Payer: COMMERCIAL

## 2025-04-03 ENCOUNTER — APPOINTMENT (OUTPATIENT)
Dept: PHYSICAL THERAPY | Facility: CLINIC | Age: 55
End: 2025-04-03
Payer: COMMERCIAL

## 2025-04-03 ENCOUNTER — OFFICE VISIT (OUTPATIENT)
Dept: ORTHOPEDIC SURGERY | Facility: CLINIC | Age: 55
End: 2025-04-03
Payer: COMMERCIAL

## 2025-04-03 DIAGNOSIS — Z96.652 AFTERCARE FOLLOWING LEFT KNEE JOINT REPLACEMENT SURGERY: ICD-10-CM

## 2025-04-03 DIAGNOSIS — Z47.1 AFTERCARE FOLLOWING LEFT KNEE JOINT REPLACEMENT SURGERY: ICD-10-CM

## 2025-04-03 PROCEDURE — 99211 OFF/OP EST MAY X REQ PHY/QHP: CPT | Performed by: PHYSICIAN ASSISTANT

## 2025-04-03 PROCEDURE — 73562 X-RAY EXAM OF KNEE 3: CPT | Mod: LT

## 2025-04-03 NOTE — PROGRESS NOTES
"          ANDRES Baig, PAVIRA, ATC  Adult Reconstruction and Joint Replacement Surgery  Phone: [phone]     Fax:[phone]            Chief Complaint   Patient presents with    Left Knee - Post-op     Discuss Surgery       HPI:  Betzaida Jc is a pleasant 55 y.o. year-old female here for follow-up of their side: left total knee arthroplasty by Dr. Perrin.  The patient is approximately 6 week(s) postop.The patient has no mechanical symptoms.  The patient has moderate swelling and pain.   The patients wound has healed uneventfully.  The patient has been doing HEP and/or outpatient PT.  No complications postoperatively.  She was recently seen in the emergency room including clinic for increased pain and swelling 2 days ago.  She has pain in the popliteal fossa area.  The venous duplex was negative for DVT.  She is continuing to take Tylenol and tramadol.  She does have some itching with the tramadol.  Her primary did give her a steroid for this.    Review of Systems  Past Medical History:   Diagnosis Date    Asthma     \"Severe persistent asthma\" per pulmonology    Chest pain     Numerous work ups for CP.    Chronic allergic rhinitis     Chronic constipation     Collapse of right lung     Chronic RML collapse with associated bronchiectasis, seen on CT    Dependence on nocturnal oxygen therapy     2-3 L O2 nightly    Diverticulosis     Hiatal hernia with GERD     4cm per EGD 8/2023    History of venous thromboembolism 06/2010    6-8 weeks post L Achilles repair, LE DVT and unilateral PE. Treated w/ anticoagulation, then found to have possible PE in other lung    Hyperlipidemia     Hypertension     Irregular heart beat     Migraine     Morbid obesity with BMI of 45.0-49.9, adult (Multi)     BRIDGER (obstructive sleep apnea)     partially CPAP compliant w/ 2-3L O2 bleed in    Paroxysmal atrial fibrillation (Multi)     Right renal stone     6 mm stone midpole caliceal system right kidney without " obstruction, last seen 6/2023    Type 2 diabetes mellitus      Patient Active Problem List   Diagnosis    Atrial fibrillation (Multi)    DVT (deep venous thrombosis) (Multi)    Dizziness    Cyst of maxillary sinus    Claudication    Chronic paroxysmal hemicrania, not intractable    Benign lipomatous neoplasm of skin and subcutaneous tissue of right arm    Anterior tibialis tendinitis of right lower extremity    Anal skin tag    Headache    Acute otalgia, right    Abnormal mammogram    Ear pressure, right    Left knee DJD    Effusion of left knee    Other seborrheic keratosis    Inflamed seborrheic keratosis    Incarcerated epigastric hernia    Impingement syndrome of left shoulder    Hypertension    History of DVT (deep vein thrombosis)    Hearing loss of right ear    Head contusion    Hammer toe of left foot    Gastroesophageal reflux disease    Gassiness    Flu syndrome    Fever    Fear of flying    Biceps tendinitis of left upper extremity    Osteoma    BRIDGER (obstructive sleep apnea)    Nausea    Nocturia    Obesity    Morbid obesity (Multi)    Liver lesion    Left rotator cuff tear    Pulmonary embolism    S/P abdominal hysterectomy    Shortness of breath at rest    Shoulder swelling    Stress incontinence in female    Skin changes due to chronic exposure to nonionizing radiation, unspecified    Sinus pressure    TIA (transient ischemic attack)    Trismus    Chest pain of uncertain etiology    Severe persistent asthma    Diabetes mellitus, type 2 (Multi)    History of hypertension    Multiple pulmonary nodules     Medication Documentation Review Audit       Reviewed by Reva Jewell CMA (Medical Assistant) on 04/03/25 at 1011      Medication Order Taking? Sig Documenting Provider Last Dose Status   albuterol 90 mcg/actuation inhaler [id number] No every 4 hours if needed. Historical Provider, MD Past Month Active   Alcohol Prep Pads pads, medicated [id number] No USE TO TEST BLOOD SUGAR ONCE DAILY Historical  MD Jennifer 2025 Active   apixaban (Eliquis) 2.5 mg tablet [id number]  Take 1 tablet (2.5 mg) by mouth 2 times a day for 5 days. After 5 days, resume regular home dose. Naren Li MD   25 235   apixaban (Eliquis) 5 mg tablet [id number] No Take 1 tablet (5 mg) by mouth twice a day. Angela Rodriguez MD 2/3/2025 Active   atorvastatin (Lipitor) 40 mg tablet [id number] No Take 1 tablet (40 mg) by mouth once daily. Angela Rodriguez MD 2025 Active   Patient not taking:  Discontinued 2559   benralizumab (Fasenra) 30 mg/mL injection [id number] No Inject 1 Syringe (30 mg) under the skin every 8 (eight) weeks. Vytas Vaitkus, DO Past Month Active   benralizumab (Fasenra) 30 mg/mL injection [id number] No Inject 1 mL (30 mg) under the skin every 8 (eight) weeks. Vytas Vaitkus, DO Past Month Active   budesonide-formoteroL (Symbicort) 80-4.5 mcg/actuation inhaler [id number] No Inhale 2 puffs 2 times a day as needed (shortness of breath). Rinse mouth with water after use to reduce aftertaste and incidence of candidiasis. Do not swallow. Vytas Vaitkus, DO Not Taking Active   cholecalciferol (Vitamin D-3) 1.25 mg (50,000 units) capsule [id number]  Take by mouth. Angela Rodriguez MD  Active   Patient not taking:  Discontinued 25 0840   Patient not taking:  Discontinued 25 0859   diphenhydrAMINE (BENADryl) 25 mg capsule [id number]  Take 1-2 capsules (25-50 mg) by mouth every 4 hours if needed. Angela Rodriguez MD   24 2352   Patient not taking:  Discontinued 25 0836   flecainide (Tambocor) 100 mg tablet [id number] No Take 1 tablet (100 mg) by mouth 2 times a day. Angela Rodriguez MD 2025 Morning Active   Patient not taking:  Discontinued 25 0859   Patient not taking:  Discontinued 25 0837      Discontinued 25 1634   linaCLOtide (Linzess) 145 mcg capsule [id number] No Take 1 tablet by mouth once daily as needed. Historical Provider,  MD Past Week Active   lisinopril 40 mg tablet [id number] No Take 1 tablet (40 mg) by mouth once daily. Angela Provider, MD 2025 Active   Patient not taking:  Discontinued 25 0859   meclizine (Antivert) 25 mg tablet [id number] No Take 1 tablet (25 mg) by mouth every 12 hours if needed. Historical Provider, MD More than a month Active   metFORMIN (Glucophage) 500 mg tablet [id number] No TAKE 1 TABLET BY MOUTH TWICE A DAY WITH A MEAL FOR 30 DAYS Angela Rodriguez MD 2025 Active   metoprolol tartrate (Lopressor) 100 mg tablet [id number] No Take 1 tablet (100 mg) by mouth 2 times a day. Angela Rodriguez MD 2025 Morning Active   montelukast (Singulair) 10 mg tablet [id number] No TAKE 1 TABLET (10 MG) BY MOUTH ONCE EVERY 24 HOURS. Angela Rodriguez MD 2025 Morning Active   omeprazole (PriLOSEC) 40 mg DR capsule [id number] No 1 capsule (40 mg) once every 24 hours. Angela Rodriguez MD 2025 Active   ondansetron (Zofran) 4 mg tablet [id number] No Take 1 tablet (4 mg) by mouth every 6 hours if needed for nausea or vomiting. Angela Rodriguez MD Past Week Active   OneTouch Delica Plus Lancet 33 gauge misc [id number] No USE 1 LANCET TO TEST BLOOD SUGAR THREE TIMES DAILY Angela Rodriguez MD 2025 Active   OneTouch Verio Flex meter misc [id number] No TEST BLOOD SUGAR ONCE DAILY Historical Provider, MD 2025 Active   OneTouch Verio test strips strip [id number] No USE TO TEST BLOOD SUGAR ONCE DAILY Angela Provider, MD 2025 Active   pantoprazole (ProtoNix) 40 mg EC tablet [id number]  Take 1 tablet (40 mg) by mouth once daily. Do not crush, chew, or split. Naren Li MD   25 5810   polyethylene glycol (Glycolax, Miralax) 17 gram/dose powder [id number]  Mix 1 cap (17g) of powder into 8 ounces of fluid and drink once daily. Naren Li MD  Active   sertraline (Zoloft) 25 mg tablet [id number]  Take 1 tablet (25 mg) by mouth once daily. Historical  Provider, MD  Active   Patient not taking:  Discontinued 25 0839   torsemide (Demadex) 20 mg tablet [id number] No Take 1 tablet (20 mg) by mouth once daily. Mary Severino PA-C Past Week Active   traMADol (Ultram) 50 mg tablet [id number]  Take 1 tablet (50 mg) by mouth every 6 hours if needed for severe pain (7 - 10) for up to 7 days. Renetta Genao PA-C   25 1513                  Allergies   Allergen Reactions    Penicillins Other, Shortness of breath and Unknown     asthma    Sulfa (Sulfonamide Antibiotics) Other     Asthma      Social History     Socioeconomic History    Marital status: Single     Spouse name: Not on file    Number of children: Not on file    Years of education: Not on file    Highest education level: Not on file   Occupational History    Not on file   Tobacco Use    Smoking status: Never    Smokeless tobacco: Never   Vaping Use    Vaping status: Never Used   Substance and Sexual Activity    Alcohol use: Yes     Alcohol/week: 2.0 standard drinks of alcohol     Types: 2 Standard drinks or equivalent per week    Drug use: Never    Sexual activity: Not on file   Other Topics Concern    Not on file   Social History Narrative    Not on file     Social Drivers of Health     Financial Resource Strain: Low Risk  (2025)    Overall Financial Resource Strain (CARDIA)     Difficulty of Paying Living Expenses: Not hard at all   Food Insecurity: No Food Insecurity (2025)    Received from [facility]    Hunger Vital Sign     Worried About Running Out of Food in the Last Year: Never true     Ran Out of Food in the Last Year: Never true   Transportation Needs: No Transportation Needs (3/12/2025)    OASIS : Transportation     Lack of Transportation (Medical): No     Lack of Transportation (Non-Medical): No     Patient Unable or Declines to Respond: No   Physical Activity: Inactive (2023)    Exercise Vital Sign     Days of Exercise per Week: 0 days     Minutes of  Exercise per Session: 0 min   Stress: No Stress Concern Present (2023)    [employer] - Occupational Stress Questionnaire     Feeling of Stress : Not at all   Social Connections: Feeling Socially Integrated (3/12/2025)    OASIS : Social Isolation     Frequency of experiencing loneliness or isolation: Never   Intimate Partner Violence: Not At Risk (2023)    Humiliation, Afraid, Rape, and Kick questionnaire     Fear of Current or Ex-Partner: No     Emotionally Abused: No     Physically Abused: No     Sexually Abused: No   Housing Stability: Low Risk  (2025)    Housing Stability Vital Sign     Unable to Pay for Housing in the Last Year: No     Number of Times Moved in the Last Year: 0     Homeless in the Last Year: No     Past Surgical History:   Procedure Laterality Date    ACHILLES TENDON SURGERY Left 2010    BI BREAST RIGHT CORE NEEDLE BIOPSY       SECTION, CLASSIC      COLONOSCOPY      CT ANGIO CORONARY ART WITH HEARTFLOW IF SCORE >30%  2022    CT HEART CORONARY ANGIOGRAM 2022 [facility]CTOR OFFICE LEGACY    ESOPHAGOSCOPY / EGD  2023    KNEE ARTHROSCOPY W/ DEBRIDEMENT Right     MR HEAD ANGIO WO IV CONTRAST  2019    MR HEAD ANGIO WO IV CONTRAST 2019 [facility] ANCILLARY LEGAC[facility]    MR NECK ANGIO WO IV CONTRAST  2019    MR NECK ANGIO WO IV CONTRAST 2019 [facility]CILLARY LEGAC[facility]    PARTIAL HYSTERECTOMY  2006    fibroids/bleeding    SHOULDER ARTHROSCOPY W/ ROTATOR CUFF REPAIR Left 2020    TOE SURGERY Right 2023    Digital arthroplasties with pin fixation, toes 2, 3, right foot.       Physical Exam  side: left Knee  There were no vitals filed for this visit.  AxO x 3 in NAD.   Assistive Device: walker. Coordination and balance intact.  Normal bilateral upper and lower extremities.  No erythema, ecchymosis, temperature changes. No popliteal lymphadenopathy,  No overlying lesion  Mood: euthymic  Respirations  non-labored  The incision is midline healing well with no signs of surrounding infection, dehiscence or drainage.   Neurovascular exam is at baseline.  No instability varus or valgus stressing the knee at 0, 30 or 60 degrees.  No instability in the AP plane at 90 degrees.  Range of motion: 0 degrees extension, 110 degrees flexion  5/5 hip flexion/knee extension/DF/PF/EHL  SILT in hamlet/saph/ per/tib distribution   Extremities warm and well perfused.  No lower extremity calf tenderness, warmth or swelling. Lower extremity well perfused  2+ Femoral/DP/PT pulses bilaterally    Imaging:  No images are attached to the encounter.    I personally reviewed multiple views of the knee today in clinic. Status post side: left Total Knee aArthroplasty. The implant is well fixed, well aligned.  No evidence of sandra-implant fracture, lucency or dislocation.    Impression/Plan:  Betzaida Jc is doing well post-operatively and happy with the results of the operation.     S/P side: left Total Knee Arthroplasty  I talked with patient at length about activity precautions and progression of activities. The patient understands their permanent precautions. At this time, you may gradually increase your activities and get back to a normal, low-impact lifestyle. Please avoid running, jumping, and heavy lifting.      3. Continue HEP or outpatient PT, per protocol.    4. Continue Post-operative instructions.  I do lengthy discussion with the patient that her knee looks great.  There is no signs of a DVT.  She has some increased pain and swelling likely due to some more advanced physical therapy.  We did discuss backing off on PT for the next couple of days.  She understands that she may be more susceptible to increasing pain and swelling with activity.  She needs to listen to her body and using pain as her guide and commonsense.  She can continue to do her exercises.  She is also scheduled to go back to work in the middle of May.  We  discussed work modifications that she should work with her employer on.  I would recommend continuing with Tylenol and icing.    5. Discussed the importance of dental prophylactic dental antibiotics lifelong. Patient may request medication refill through MyChart,       Pharmacy or surgeons office.    All questions answered.    Follow-up 6 months with x-rays at next visit.    ANDRES Baig, PA-C, ATC  Orthopedic Physician Assisant  Adult Reconstruction and Total Joint Replacement  General Orthopedics  Department of Orthopaedic Surgery  [place] [id number]  [employer] messaging preferred

## 2025-04-04 ENCOUNTER — APPOINTMENT (OUTPATIENT)
Dept: PHYSICAL THERAPY | Facility: CLINIC | Age: 55
End: 2025-04-04
Payer: COMMERCIAL

## 2025-04-07 ENCOUNTER — TREATMENT (OUTPATIENT)
Dept: PHYSICAL THERAPY | Facility: CLINIC | Age: 55
End: 2025-04-07
Payer: COMMERCIAL

## 2025-04-07 DIAGNOSIS — M17.12 UNILATERAL PRIMARY OSTEOARTHRITIS, LEFT KNEE: ICD-10-CM

## 2025-04-07 DIAGNOSIS — M25.562 ACUTE PAIN OF LEFT KNEE: ICD-10-CM

## 2025-04-07 PROCEDURE — 97110 THERAPEUTIC EXERCISES: CPT | Mod: GP,CQ | Performed by: PHYSICAL THERAPY ASSISTANT

## 2025-04-07 NOTE — PROGRESS NOTES
"Physical Therapy    Physical Therapy Treatment    Patient Name: Betzaida Jc  MRN: [id number]  Today's Date: 4/7/2025    Time Entry:   Time Calculation  Start Time: 0815  Stop Time: 0905  Time Calculation (min): 50 min     PT Therapeutic Procedures Time Entry  Therapeutic Exercise Time Entry: 40       Authorization /Certification / Visits allowed: NO AUTH / NO OOP /  / 30 VISITS / $1150 DED MET / $3900 OOP  MET / ANTHEM / AVAILITY   Visit 3    Assessment:  Felt relief after the ice massage to the Baker's cyst . Instructed in and issued handout re: treatment parameters. Able to resume a modified program today. Encouraged her to eat more than 1 meal daily during her knee rehab. Advised her to resume her HEP gradually and modify based on her knee's response the following day.     Plan:   QUE, cable column, SRB as able    Current Problem  1. Acute pain of left knee  Follow Up In Physical Therapy      2. Unilateral primary osteoarthritis, left knee  Follow Up In Physical Therapy            Subjective    Was held for observation d/t her \"Baker's cyst , it was bloody when it burst\". Eats only once a day.  Precautions   Diabetes, HBP, asthma, heart disease, hx of TIA, baker's cyst , h/o DVT (per pt)   Pain   6/10 VAS     Objective   L knee ROM: 0-2-100 deg   Titanium rods in B great toes      Treatments:  Therapeutic Exercise:  Ice massage in prone to Baker's cyst x 5 min   Heel slides w/strap x 20 L  DKTC/HS curl on pball x 20  Quad set 3 sec x 20, knee L  Quad set 3 sec x 10, ankle L  SAQ 1# 3 sec x 15 L --not today   SLR  x 10 L   Sidelying hip abd x 8  LAQ 3 sec x 10 L    Not today:   6\" step up x 10 L  4\" lateral step up x 10 L  Slant board stretch 10 sec x 10  Seated HS stretch 10 sec x 10    Ice to L knee w/LLE elevated on pillow x 10 min      Goals:  Active       PT Problem       Pt will increase L knee AROm to 0-120 to facilitate improved gait mechanics.        Start:  03/20/25    Expected End:  04/19/25       "      Pt will amb in clinic independently without gait deviations to indicate improved pain, motion, and strength in LLE.        Start:  03/20/25    Expected End:  04/19/25            Pt will negotiate one flight of steps reciprocally to facilitate improved mobility in home and indicate improved LLE strength.        Start:  03/20/25    Expected End:  04/19/25            The patient will improve score on LEFS by 20 points to indicate decreased pain and increased functional level.         Start:  03/20/25    Expected End:  05/19/25            Patient will be independent with home exercise program for home maintenance.        Start:  03/20/25    Expected End:  05/19/25

## 2025-04-09 ENCOUNTER — TREATMENT (OUTPATIENT)
Dept: PHYSICAL THERAPY | Facility: CLINIC | Age: 55
End: 2025-04-09
Payer: COMMERCIAL

## 2025-04-09 DIAGNOSIS — M17.12 UNILATERAL PRIMARY OSTEOARTHRITIS, LEFT KNEE: ICD-10-CM

## 2025-04-09 DIAGNOSIS — M25.562 ACUTE PAIN OF LEFT KNEE: ICD-10-CM

## 2025-04-09 PROCEDURE — 97110 THERAPEUTIC EXERCISES: CPT | Mod: GP,CQ | Performed by: PHYSICAL THERAPY ASSISTANT

## 2025-04-09 NOTE — PROGRESS NOTES
"Physical Therapy    Physical Therapy Treatment    Patient Name: Betzaida Jc  MRN: [id number]  Today's Date: 4/9/2025    Time Entry:   Time Calculation  Start Time: 0815  Stop Time: 0905  Time Calculation (min): 50 min     PT Therapeutic Procedures Time Entry  Therapeutic Exercise Time Entry: 40       Authorization /Certification / Visits allowed: NO AUTH / NO OOP /  / 30 VISITS / $1150 DED MET / $3900 OOP  MET / ANTHEM / AVAILITY   Visit 4    Assessment:  Able to resume her  program today. Encouraged her to resume her HEP gradually and increase to to 2-3 sets of each exercise. Reiterated using cold on her Baker's cyst daily to 3x/day so she can progress in PT.     Plan:   LP, cable column, SRB as able    Current Problem  1. Acute pain of left knee  Follow Up In Physical Therapy      2. Unilateral primary osteoarthritis, left knee  Follow Up In Physical Therapy            Subjective    Felt so much better after her last visit. Semi-compliant w/ HEP d/t other issues . Has not iced or used ice massage on Baker's cyst since her last visit.   Precautions   Diabetes, HBP, asthma, heart disease, hx of TIA, baker's cyst , h/o DVT (per pt)   Pain   6/10 VAS     Objective   S/L abduction performed w/ min flexion; corrects w/ cues   Titanium rods in B great toes      Treatments:  Therapeutic Exercise:  Ice massage in prone to Baker's cyst x 5 min   Heel slides w/strap x 20 L  DKTC/HS curl on pball x 20  Quad set 3 sec x 20, knee L  Quad set 3 sec x 10, ankle L-not today   SAQ 1# 3 sec x 15 L --not today   SLR  2 x 10 L   Sidelying hip abd 2 x 10  LAQ 3 sec 2 x 10 L  4\" step up x 10 L  4\" lateral step up x 10 L  Slant board stretch 10 sec x 10  Seated HS stretch 10 sec x 10  Prone quad stretch 1 min x 2     Ice to L knee x 5 min     Goals:  Active       PT Problem       Pt will increase L knee AROm to 0-120 to facilitate improved gait mechanics.        Start:  03/20/25    Expected End:  04/19/25            Pt will amb in " clinic independently without gait deviations to indicate improved pain, motion, and strength in LLE.        Start:  03/20/25    Expected End:  04/19/25            Pt will negotiate one flight of steps reciprocally to facilitate improved mobility in home and indicate improved LLE strength.        Start:  03/20/25    Expected End:  04/19/25            The patient will improve score on LEFS by 20 points to indicate decreased pain and increased functional level.         Start:  03/20/25    Expected End:  05/19/25            Patient will be independent with home exercise program for home maintenance.        Start:  03/20/25    Expected End:  05/19/25

## 2025-04-10 ENCOUNTER — SPECIALTY PHARMACY (OUTPATIENT)
Dept: PHARMACY | Facility: CLINIC | Age: 55
End: 2025-04-10

## 2025-04-10 NOTE — PROGRESS NOTES
Per phone call with Inocencia at [facility] [place], there is an insurance carve out and patient must fill Fasenra with [facility] (home delivery). Sent this update to Dr. Hodge. Patient currently has appointment scheduled with him tomorrow for further discussion.

## 2025-04-11 ENCOUNTER — APPOINTMENT (OUTPATIENT)
Dept: PULMONOLOGY | Facility: CLINIC | Age: 55
End: 2025-04-11
Payer: COMMERCIAL

## 2025-04-16 ENCOUNTER — TREATMENT (OUTPATIENT)
Dept: PHYSICAL THERAPY | Facility: CLINIC | Age: 55
End: 2025-04-16
Payer: COMMERCIAL

## 2025-04-16 DIAGNOSIS — M17.12 UNILATERAL PRIMARY OSTEOARTHRITIS, LEFT KNEE: ICD-10-CM

## 2025-04-16 DIAGNOSIS — M25.562 ACUTE PAIN OF LEFT KNEE: ICD-10-CM

## 2025-04-16 PROCEDURE — 97110 THERAPEUTIC EXERCISES: CPT | Mod: GP

## 2025-04-16 NOTE — PROGRESS NOTES
"Physical Therapy    Physical Therapy Treatment    Patient Name: Betzaida Jc  MRN: [id number]  Today's Date: 4/16/2025    Time Entry:   Time Calculation  Start Time: 1520  Stop Time: 1605  Time Calculation (min): 45 min     PT Therapeutic Procedures Time Entry  Therapeutic Exercise Time Entry: 40  PT Modalities Time Entry  Hot/Cold Pack Time Entry: 5    Authorization /Certification / Visits allowed: NO AUTH / NO OOP /  / 30 VISITS / $1150 DED MET / $3900 OOP  MET / ANTHEM / AVAILITY   Visit 5    Assessment:  Re-iterated icing regularly and not increasing activity too much too quickly.     Plan:   LP, cable column, SRB as able    Current Problem  1. Acute pain of left knee  Follow Up In Physical Therapy      2. Unilateral primary osteoarthritis, left knee  Follow Up In Physical Therapy            Subjective    Has been doing better overall, but knee is tender today from doing the stairs and some errands.   Precautions   Diabetes, HBP, asthma, heart disease, hx of TIA, baker's cyst , h/o DVT (per pt)   Pain   5/10 Post L knee     Objective   Titanium rods in B great toes  0-118` L knee, 122` after heel slides    Treatments:  Therapeutic Exercise:  Ice massage in prone to Baker's cyst x 5 min   Heel slides w/strap x 20 L  Quad set 3 sec x 20, knee L  SLR x 12 L  DKTC/HS curl on pball x 20  Sidelying hip abd 2 x 10  SLR x 8  LAQ 3 sec 2 x 10 L  4\" step up x 10 L  4\" lateral step up x 10 L  Slant board stretch 10 sec x 10  Seated HS stretch 10 sec x 10  Prone quad stretch 1 min x 2     Ice to L knee x 5 min     Goals:  Active       PT Problem       Pt will increase L knee AROm to 0-120 to facilitate improved gait mechanics.        Start:  03/20/25    Expected End:  04/19/25            Pt will amb in clinic independently without gait deviations to indicate improved pain, motion, and strength in LLE.        Start:  03/20/25    Expected End:  04/19/25            Pt will negotiate one flight of steps reciprocally to " facilitate improved mobility in home and indicate improved LLE strength.        Start:  03/20/25    Expected End:  04/19/25            The patient will improve score on LEFS by 20 points to indicate decreased pain and increased functional level.         Start:  03/20/25    Expected End:  05/19/25            Patient will be independent with home exercise program for home maintenance.        Start:  03/20/25    Expected End:  05/19/25

## 2025-04-18 ENCOUNTER — TREATMENT (OUTPATIENT)
Dept: PHYSICAL THERAPY | Facility: CLINIC | Age: 55
End: 2025-04-18
Payer: COMMERCIAL

## 2025-04-18 DIAGNOSIS — M17.12 UNILATERAL PRIMARY OSTEOARTHRITIS, LEFT KNEE: ICD-10-CM

## 2025-04-18 DIAGNOSIS — M25.562 ACUTE PAIN OF LEFT KNEE: ICD-10-CM

## 2025-04-18 PROCEDURE — 97110 THERAPEUTIC EXERCISES: CPT | Mod: GP

## 2025-04-18 NOTE — PROGRESS NOTES
"Physical Therapy    Physical Therapy Treatment    Patient Name: Betzaida Jc  MRN: [id number]  Today's Date: 4/18/2025    Time Entry:   Time Calculation  Start Time: 0817  Stop Time: 0900  Time Calculation (min): 43 min     PT Therapeutic Procedures Time Entry  Therapeutic Exercise Time Entry: 43       Authorization /Certification / Visits allowed: NO AUTH / NO OOP /  / 30 VISITS / $1150 DED MET / $3900 OOP  MET / ANTHEM / AVAILITY   Visit 6    Assessment:  No increased pain EOS, tolerated addition of leg press and SRB without issue. Continue to emphasize use of ice at home.     Plan:   Cable column as able    Current Problem  1. Acute pain of left knee  Follow Up In Physical Therapy      2. Unilateral primary osteoarthritis, left knee  Follow Up In Physical Therapy          Subjective    Sore this morning, was up a lot last night to pee so didn't sleep well.   Precautions   Diabetes, HBP, asthma, heart disease, hx of TIA, baker's cyst , h/o DVT (per pt)   Pain   6/10 Post L knee     Objective   Titanium rods in B great toes  0-120` L    Treatments:  Therapeutic Exercise:  Ice massage in prone to Baker's cyst x 5 min   Heel slides w/strap x 20 L  Quad set 3 sec x 20, knee L  SLR x 12 L  SAQ 5 sec x 15 L  DKTC/HS curl on pball x 20  Sidelying hip abd 2 x 10  SLR x 10  LAQ 3 sec x 15 L  LP DL 50# 2 x 10  4\" step up x 10 L  4\" lateral step up x 10 L  Slant board stretch 10 sec x 10  SRB x 5 min      Goals:  Active       PT Problem       Pt will increase L knee AROm to 0-120 to facilitate improved gait mechanics.        Start:  03/20/25    Expected End:  04/19/25            Pt will amb in clinic independently without gait deviations to indicate improved pain, motion, and strength in LLE.        Start:  03/20/25    Expected End:  04/19/25            Pt will negotiate one flight of steps reciprocally to facilitate improved mobility in home and indicate improved LLE strength.        Start:  03/20/25    Expected End:  " 04/19/25            The patient will improve score on LEFS by 20 points to indicate decreased pain and increased functional level.         Start:  03/20/25    Expected End:  05/19/25            Patient will be independent with home exercise program for home maintenance.        Start:  03/20/25    Expected End:  05/19/25

## 2025-04-21 ENCOUNTER — OFFICE VISIT (OUTPATIENT)
Dept: PULMONOLOGY | Facility: CLINIC | Age: 55
End: 2025-04-21
Payer: COMMERCIAL

## 2025-04-21 VITALS
BODY MASS INDEX: 37.19 KG/M2 | TEMPERATURE: 97.7 F | HEIGHT: 66 IN | WEIGHT: 231.4 LBS | HEART RATE: 79 BPM | OXYGEN SATURATION: 99 % | SYSTOLIC BLOOD PRESSURE: 146 MMHG | DIASTOLIC BLOOD PRESSURE: 87 MMHG

## 2025-04-21 DIAGNOSIS — J45.50 SEVERE PERSISTENT ASTHMA, UNSPECIFIED WHETHER COMPLICATED (MULTI): Primary | ICD-10-CM

## 2025-04-21 DIAGNOSIS — L50.9 URTICARIA OF ENTIRE BODY: ICD-10-CM

## 2025-04-21 PROCEDURE — 3008F BODY MASS INDEX DOCD: CPT | Performed by: INTERNAL MEDICINE

## 2025-04-21 PROCEDURE — 3079F DIAST BP 80-89 MM HG: CPT | Performed by: INTERNAL MEDICINE

## 2025-04-21 PROCEDURE — 3044F HG A1C LEVEL LT 7.0%: CPT | Performed by: INTERNAL MEDICINE

## 2025-04-21 PROCEDURE — 4010F ACE/ARB THERAPY RXD/TAKEN: CPT | Performed by: INTERNAL MEDICINE

## 2025-04-21 PROCEDURE — 3077F SYST BP >= 140 MM HG: CPT | Performed by: INTERNAL MEDICINE

## 2025-04-21 PROCEDURE — 99214 OFFICE O/P EST MOD 30 MIN: CPT | Performed by: INTERNAL MEDICINE

## 2025-04-21 PROCEDURE — 1036F TOBACCO NON-USER: CPT | Performed by: INTERNAL MEDICINE

## 2025-04-21 RX ORDER — BUDESONIDE AND FORMOTEROL FUMARATE DIHYDRATE 80; 4.5 UG/1; UG/1
2 AEROSOL RESPIRATORY (INHALATION) 2 TIMES DAILY PRN
Qty: 10.2 G | Refills: 5 | Status: SHIPPED | OUTPATIENT
Start: 2025-04-21 | End: 2025-10-18

## 2025-04-21 ASSESSMENT — ASTHMA QUESTIONNAIRES
QUESTION_3 LAST FOUR WEEKS HOW OFTEN DID YOUR ASTHMA SYMPTOMS (WHEEZING, COUGHING, SHORTNESS OF BREATH, CHEST TIGHTNESS OR PAIN) WAKE YOU UP AT NIGHT OR EARLIER THAN USUAL IN THE MORNING: NOT AT ALL
QUESTION_2 LAST FOUR WEEKS HOW OFTEN HAVE YOU HAD SHORTNESS OF BREATH: 3 TO 6 TIMES A WEEK
QUESTION_1 LAST FOUR WEEKS HOW MUCH OF THE TIME DID YOUR ASTHMA KEEP YOU FROM GETTING AS MUCH DONE AT WORK, SCHOOL OR AT HOME: NONE OF THE TIME
ACT_TOTALSCORE: 22
QUESTION_5 LAST FOUR WEEKS HOW WOULD YOU RATE YOUR ASTHMA CONTROL: COMPLETELY CONTROLLED
QUESTION_4 LAST FOUR WEEKS HOW OFTEN HAVE YOU USED YOUR RESCUE INHALER OR NEBULIZER MEDICATION (SUCH AS ALBUTEROL): ONCE A WEEK OR LESS

## 2025-04-21 ASSESSMENT — ENCOUNTER SYMPTOMS
COUGH: 0
SHORTNESS OF BREATH: 0
ROS SKIN COMMENTS: GENERALIZED ITCHING
WHEEZING: 0

## 2025-04-21 NOTE — PROGRESS NOTES
"    Department of Medicine  Division of Pulmonary, Critical Care, and Sleep Medicine  Follow Up  [facility], [address]    Betzaida Jc is a 55 y.o. female who returns as a follow up for severe asthma. Last visit was on 11/15/2024.    Physician HPI (4/21/2025):  She underwent left total knee arthroplasty in February 2025. She had ER visit for knee swelling and dyspnea in April 2025. CTA and lower extremity duplex were both negative for thromboembolic disease. She denies any shortness of breath other than when she is working with physical therapy. She now has a Baker's Cyst on her left knee, but was told she cannot have any procedures on that knee until 6 months post-op.    Her last injection of benralizumab was 1/9/2025 and she was due for one in March 2025, however, her insurance became \"out-of-network\" with multiple infusion centers. Drug rep states that [facility] should be able to cover it. She has had a good response in her total eosinophil account and symptoms. She has only used Symbicort once since her last visit.    Sleep study is schedule for 4/29/2025.    After her knee surgery, she had developed a full body itch which began in the palms of her hands and then spread over her entire body. She did not have a rash, but noticed when she scratched herself she began developing welts. By her dermatologist, she was prescribed prednisone, hydrocortisone, triamcinolone, and is now on Zyrtec with good symptom control.    Per previous notes:  11/15/2024:  In October she called stating that her bronchitis is acting up and went to urgent care who told her she may need a CT scan.  I had ordered a chest x-ray, which she did not complete. Due to high eosinophil counts and difficult to control symptoms, we started her on benralizumab at last visit.  She had completed all 3 induction doses. She had PFT done which was negative for obstruction but did show hyperinflation.  Her FeNO testing was negative.  " She is also followed up with sleep medicine for scheduling her for a repeat PSG, but this may not happen until after December 2024.     She went up going to [facility] emergency room on 10/10/2024 for cough and bodyaches for 3 days.  She was prescribed antibiotics.  Chest x-ray was read as being negative.  Blood work was negative for active infection.  Despite completing Fasenra, she has not noticed an appreciable improvement in her breathing.  She also states that she has been having headaches and sinus congestion and difficulty sleeping night despite wearing her CPAP machine.  She feels that she is suffocating with the mask.  She also endorses that she has lost a significant amount of weight gain over the past year (50 pounds). She works at [employer] and exposed to multiple people on a daily basis.    8/9/2024:  54 y.o. female who is a former patient of Dr. Ambriz. She was following with him for severe asthma, bronchiectasis, pulmonary nodules, BRIDGER.  She also has a past medical history significant for GERD, HFpEF, A-fib, PE, hypertension.     She states she has been on chronic prednisone therapy of 10 mg daily for over 2 years.  Since being started on prednisone, her symptoms have been under good control.  Triggers include breathing air conditioned air, and changes of seasons.  Last time she saw Dr. Ambriz was in December 2023, and at that time she was maintained on Trelegy Ellipta 200.  She states that she was unable to afford Trelegy and has been only on albuterol as needed.  She also takes Singulair at night.     She is also taking prednisone for ankle arthritis.  This is according to her podiatrist, however, I am unable to ascertain from podiatry's notes if this is indicated for ankle arthritis or not.  She states she used to have PFTs with Dr. Ambriz every 6 months, however, there are no records of spirometry documentation in his notes or in our system.     Her IgE levels have been low.  Her most recent eosinophil  count was 110, however, she has had counts as high as 730.     Since being placed on chronic prednisone therapy, she has not had any exacerbations, but was a frequent exacerbater beforehand.     She is a lifetime non-smoker.  She works at [employer].  She also states she has had COVID 5 times.     Of note, she uses CPAP at night, but also uses 3 L/min oxygen bleed in.  She also has a history of nodules which have been stable since 2020.    I have personally reviewed PMH, PSH, Family History in the HISTORY tab of this chart, and unless noted above are not pertinent to the presenting complaint.  I have personally reviewed Social History as provided in the electronic medical record.    Immunization History:  Immunization History   Administered Date(s) Administered    COVID-19, mRNA, LNP-S, PF, 30 mcg/0.3 mL dose 03/31/2021, 04/29/2021    Flu vaccine (IIV4), preservative free *Check age/dose* 08/23/2017, 09/25/2018, 09/19/2019, 09/16/2021    Flu vaccine, quadrivalent, no egg protein, age 6 month or greater (FLUCELVAX) 09/25/2020, 09/20/2022, 09/26/2023    Flu vaccine, trivalent, preservative free, no egg protein, age 18y+ (Flublok) 09/01/2020    Influenza, seasonal, injectable 09/15/2017    Pfizer Gray Cap SARS-CoV-2 01/24/2022, 07/12/2022    Pneumococcal conjugate vaccine, 20-valent (PREVNAR 20) 09/26/2023    Pneumococcal polysaccharide vaccine, 23-valent, age 2 years and older (PNEUMOVAX 23) 11/04/2020    Zoster vaccine, recombinant, adult (SHINGRIX) 06/10/2022, 08/21/2022       Current Medications:  Current Outpatient Medications   Medication Instructions    albuterol 90 mcg/actuation inhaler Every 4 hours PRN    Alcohol Prep Pads pads, medicated USE TO TEST BLOOD SUGAR ONCE DAILY    apixaban (ELIQUIS) 5 mg, 2 times daily    atorvastatin (LIPITOR) 40 mg, Daily    benralizumab (FASENRA) 30 mg, subcutaneous, Every 8 weeks    benralizumab (FASENRA) 30 mg, subcutaneous, Every 8 weeks    budesonide-formoteroL (Symbicort)  "80-4.5 mcg/actuation inhaler 2 puffs, inhalation, 2 times daily PRN, Rinse mouth with water after use to reduce aftertaste and incidence of candidiasis. Do not swallow.    cholecalciferol (Vitamin D-3) 1.25 mg (50,000 units) capsule Take by mouth.    diphenhydrAMINE (BENADRYL) 25-50 mg, Every 4 hours PRN    Eliquis 2.5 mg, oral, 2 times daily, After 5 days, resume regular home dose.    flecainide (TAMBOCOR) 100 mg, 2 times daily    linaCLOtide (Linzess) 145 mcg capsule 1 tablet, Daily PRN    lisinopril 40 mg, Daily    meclizine (ANTIVERT) 25 mg, Every 12 hours PRN    metFORMIN (Glucophage) 500 mg tablet TAKE 1 TABLET BY MOUTH TWICE A DAY WITH A MEAL FOR 30 DAYS    metoprolol tartrate (LOPRESSOR) 100 mg, 2 times daily    montelukast (Singulair) 10 mg tablet TAKE 1 TABLET (10 MG) BY MOUTH ONCE EVERY 24 HOURS.    omeprazole (PriLOSEC) 40 mg DR capsule 1 capsule, Every 24 hours    ondansetron (ZOFRAN) 4 mg, Every 6 hours PRN    OneTouch Delica Plus Lancet 33 gauge misc USE 1 LANCET TO TEST BLOOD SUGAR THREE TIMES DAILY    OneTouch Verio Flex meter misc TEST BLOOD SUGAR ONCE DAILY    OneTouch Verio test strips strip USE TO TEST BLOOD SUGAR ONCE DAILY    pantoprazole (PROTONIX) 40 mg, oral, Daily, Do not crush, chew, or split.    polyethylene glycol (Glycolax, Miralax) 17 gram/dose powder Mix 1 cap (17g) of powder into 8 ounces of fluid and drink once daily.    sertraline (ZOLOFT) 25 mg, Daily    torsemide (DEMADEX) 20 mg, oral, Daily        Drug Allergies/Intolerances:  RX Allergies[1]     Review of Systems:  Review of Systems   Respiratory:  Negative for cough, shortness of breath and wheezing.    Skin:         Generalized itching        All other review of systems are negative and/or non-contributory.    Physical Examination:  Vitals:    04/21/25 1304   BP: 146/87   Pulse: 79   Temp: 36.5 °C (97.7 °F)   SpO2: 99%   Weight: 105 kg (231 lb 6.4 oz)   Height: 1.676 m (5' 6\")          GEN: appears well  CV: RRR, no " m/g/r  LUNGS: good effort, clear bilaterally, no w/r/r  EXT: no edema, cyanosis, clubbing      Exacerbation History     2024: URI    Pulmonary Function Test Results     8/15/2024:  FVC: 4.05 L (135%)  FEV1: 3.07 L (128%)  FEV1/FVC: 76  T.31 L (135%)  DLCO: 130%  No significant bronchodilator response.  FeNO: 15ppb    O2 Requirements     6MWT: none    Sleep Study     Scheduled 2025    CAT score     N/A    Peak Flow and ACT     2025: 22  11/15/2024: 19  2024: 23     Chest Radiograph     10/10/2024:  Lungs and pleura: Mild bibasilar atelectasis.  No consolidation.    Previously reported nodule/nodular density in the right lower lobe is not   as well-seen on this exam. No pleural effusion. No pneumothorax.    Chest CT Scan     2025 ([facility] no images available to view in PACS)  Lung parenchyma and airways: There is no new pulmonary consolidation.   Chronic complete right middle lobe atelectasis with volume loss and   bronchiectasis, unchanged. Few tiny subpleural nodules right upper lobe   appear unchanged. 7 mm nodule right lower lobe appears unchanged.   Additional 3 mm nodule versus possible tiny area of mucous plugging   medial right lower lobe, unchanged. Tiny nodule right lung base also   appears unchanged. Unchanged tiny subpleural nodule left lower lobe. No   new pulmonary nodules. The central airways are patent.     No CT evidence of pulmonary embolism.    CT angio chest w and wo IV contrast 2024  Evaluation for thromboembolic disease:  No filling defects to suggest acute pulmonary embolism.  The central  pulmonary arteries are within normal size limits.     Lower neck, lymph nodes, and mediastinum:  No pathologically enlarged lymph nodes in the chest. Visualized thyroid  gland is unremarkable. Small hiatal hernia. No acute mediastinal process.     Lung parenchyma, pleura, and airways:  Patent central airways. Eventration of both hemidiaphragms with minimal  compressive  bibasilar atelectasis. Stable volume loss and  bronchiolectasis in the anteromedial right lower lobe.  No consolidations  or edema. No suspicious pulmonary nodules.  No pleural effusions.  No  pneumothorax.     Impression  IMPRESSION:  No acute pulmonary embolism.     Small, stable mixed-type hiatal hernia.      6/8/2024:  1.  No suspicious pulmonary nodules identified. Scattered 2-4 mm  nodules throughout the lungs have remained stable to at least  02/28/2020 and are consistent with benign etiology.  2. No acute intrathoracic pathology. Chronic collapse of the right  middle lobe is unchanged compared to remote examinations.  3. Additional findings as above.     10/7/2023:  There is adequate contrast opacification of the pulmonary arterial  vasculature. No filling defect or vessel cutoff to indicate pulmonary  embolus.      The heart size is within normal limits.  No pericardial effusion is  identified. The aorta and pulmonary arteries are normal in caliber.  No thoracic aortic dissection      There are no pathologically enlarged mediastinal, hilar, or axillary  lymph nodes are seen.      The trachea and mainstem bronchi are patent. No suspicious pulmonary  nodules are seen. The lungs are clear. There is no evidence of  pneumothorax or pleural effusion.     7/7/2022:  No evidence of acute pulmonary embolism.     6 mm and 7 mm groundglass nodular opacities in the right middle lobe  along the fissure may be infectious/inflammatory etiology; follow-up  CT chest recommended in 6 to months to assess stability/resolution.     4/20/2021:  1.  No CT finding of acute pulmonary embolus or aortic dissection or  change from prior.  2. Chronic atelectasis in the right middle lobe.    Bronchoscopy     None    Labs     Lab Results   Component Value Date    WBC 6.3 02/22/2025    HGB 9.6 (L) 02/22/2025    HCT 31.4 (L) 02/22/2025    MCV 89 02/22/2025     02/22/2025     Lab Results   Component Value Date    BNP 30 06/08/2023      Lab Results   Component Value Date    IGE 18 03/30/2022    EOSABS 0.00 02/22/2025    EOSABS 0.00 02/03/2025    EOSABS 0.18 09/19/2024    EOSABS 0.11 05/30/2024    EOSABS 0.73 (H) 04/30/2024    ICIGE 39.7 03/30/2022       Echocardiogram     No results found for this or any previous visit from the past 365 days.       ASSESSMENT & PLAN     Problem List Items Addressed This Visit       Severe persistent asthma - Primary    Relevant Medications    budesonide-formoterol (Symbicort) 80-4.5 mcg/actuation inhaler    benralizumab (Fasenra) 30 mg/mL injection    Other Relevant Orders    Follow Up In Pulmonology    Urticaria of entire body    Relevant Orders    Referral to Allergy        SUMMARY:  55 y.o. female with severe persistent asthma (eosinophilic type). ACT score today is 22. Appears to be doing well, but is overdue for benralizumab by a few weeks (due to insurance issues).    Plan:  -Sending maintenance Rx for benralizumab to [facility] to be injected at home (due to insurance coverage)  -Would like to keep her on benralizumab for at least 1 year to see how she responds  -Symbicort PRN  -PSG scheduled for April 29  -Referral to allergy for diffuse urticaria.    Follow-up: 6 months      Stuart Hodge DO  Staff Physician - Pulmonary & Critical Care  04/21/25 1:02 PM  Office number: [phone]   Fax number:  [phone]          [1]   Allergies  Allergen Reactions    Penicillins Other, Shortness of breath and Unknown     asthma    Sulfa (Sulfonamide Antibiotics) Other     Asthma

## 2025-04-22 ENCOUNTER — TREATMENT (OUTPATIENT)
Dept: PHYSICAL THERAPY | Facility: CLINIC | Age: 55
End: 2025-04-22
Payer: COMMERCIAL

## 2025-04-22 DIAGNOSIS — M25.562 ACUTE PAIN OF LEFT KNEE: ICD-10-CM

## 2025-04-22 DIAGNOSIS — M17.12 UNILATERAL PRIMARY OSTEOARTHRITIS, LEFT KNEE: ICD-10-CM

## 2025-04-22 PROCEDURE — 97110 THERAPEUTIC EXERCISES: CPT | Mod: GP

## 2025-04-22 NOTE — PROGRESS NOTES
"Physical Therapy    Physical Therapy Treatment    Patient Name: Betzaida Jc  MRN: [id number]  Today's Date: 4/22/2025    Time Entry:   Time Calculation  Start Time: 0930  Stop Time: 1010  Time Calculation (min): 40 min     PT Therapeutic Procedures Time Entry  Therapeutic Exercise Time Entry: 40       Authorization /Certification / Visits allowed: NO AUTH / NO OOP /  / 30 VISITS / $1150 DED MET / $3900 OOP  MET / ANTHEM / AVAILITY   Visit 7    Assessment:  Good improvement in flexion ROM today. Hesitant to try reciprocal negotiation of steps, but able to do about 5 steps up and down. Does appear effortful but did not report a lot of knee pain.   Plan:   Add forward in cc    Current Problem  1. Acute pain of left knee  Follow Up In Physical Therapy      2. Unilateral primary osteoarthritis, left knee  Follow Up In Physical Therapy          Subjective    Its a little stiff and achy.   Precautions   Diabetes, HBP, asthma, heart disease, hx of TIA, baker's cyst , h/o DVT (per pt)   Pain   5/10 Post L knee     Objective   Titanium rods in B great toes  0-125` L    Treatments:  Therapeutic Exercise:  Ice massage in prone to Baker's cyst x 5 min   SRB x 5 min  Stairs w/L rail reciprocal x 5   Slant board stretch 30 sec x 2  4\" step up x 10 L  4\" lateral step up x 10 L  Retro ccamb 3.5 plates x 5  LP DL 50# 2 x 10  DKTC/HS curl on pball x 20  Quad set 5 sec x 20, knee L  SLR x 10 L  SAQ 5 sec x 15 L  Sidelying hip abd 2 x 10  SLR x 10    Goals:  Active       PT Problem       Pt will increase L knee AROm to 0-120 to facilitate improved gait mechanics.        Start:  03/20/25    Expected End:  04/19/25            Pt will amb in clinic independently without gait deviations to indicate improved pain, motion, and strength in LLE.        Start:  03/20/25    Expected End:  04/19/25            Pt will negotiate one flight of steps reciprocally to facilitate improved mobility in home and indicate improved LLE strength.        " Start:  03/20/25    Expected End:  04/19/25            The patient will improve score on LEFS by 20 points to indicate decreased pain and increased functional level.         Start:  03/20/25    Expected End:  05/19/25            Patient will be independent with home exercise program for home maintenance.        Start:  03/20/25    Expected End:  05/19/25

## 2025-04-24 ENCOUNTER — APPOINTMENT (OUTPATIENT)
Dept: PHYSICAL THERAPY | Facility: CLINIC | Age: 55
End: 2025-04-24
Payer: COMMERCIAL

## 2025-04-25 ENCOUNTER — TREATMENT (OUTPATIENT)
Dept: PHYSICAL THERAPY | Facility: CLINIC | Age: 55
End: 2025-04-25
Payer: COMMERCIAL

## 2025-04-25 DIAGNOSIS — M17.12 UNILATERAL PRIMARY OSTEOARTHRITIS, LEFT KNEE: ICD-10-CM

## 2025-04-25 DIAGNOSIS — M25.562 ACUTE PAIN OF LEFT KNEE: ICD-10-CM

## 2025-04-25 PROCEDURE — 97110 THERAPEUTIC EXERCISES: CPT | Mod: GP

## 2025-04-25 NOTE — PROGRESS NOTES
"Physical Therapy    Physical Therapy Treatment    Patient Name: Betzaida Jc  MRN: [id number]  Today's Date: 4/25/2025    Time Entry:   Time Calculation  Start Time: 1033  Stop Time: 1115  Time Calculation (min): 42 min     PT Therapeutic Procedures Time Entry  Therapeutic Exercise Time Entry: 42       Authorization /Certification / Visits allowed: NO AUTH / NO OOP /  / 30 VISITS / $1150 DED MET / $3900 OOP  MET / ANTHEM / AVAILITY   Visit 8    Assessment:  Pt came in very sore all over, but reported feeling better in general at EOS. Able to increase step up height and do 2x10 SLR without a long break between.   Plan:   Forward in     Current Problem  1. Acute pain of left knee  Follow Up In Physical Therapy      2. Unilateral primary osteoarthritis, left knee  Follow Up In Physical Therapy          Subjective    Its a little stiff and achy.   Precautions   Diabetes, HBP, asthma, heart disease, hx of TIA, baker's cyst , h/o DVT (per pt)   Pain   5/10 Post L knee     Objective   Titanium rods in B great toes  0-125` L    Treatments:  Therapeutic Exercise:  Ice massage in prone to Baker's cyst x 5 min, MHP to low back  DKTC/HS curl on pball x 20  Quad set 5 sec x 20, knee L  SLR 2 x 10 L  SAQ 5 sec x 20 L  Sidelying hip abd 2 x 10  Retro ccamb 3.5 plates x 5  6\" step up x 10 L  6\" lateral step up x 10 L  Slant board stretch 30 sec x 2  LP DL 50# 2 x 10  SRB x 5 min    Goals:  Active       PT Problem       Pt will increase L knee AROm to 0-120 to facilitate improved gait mechanics.        Start:  03/20/25    Expected End:  04/19/25            Pt will amb in clinic independently without gait deviations to indicate improved pain, motion, and strength in LLE.        Start:  03/20/25    Expected End:  04/19/25            Pt will negotiate one flight of steps reciprocally to facilitate improved mobility in home and indicate improved LLE strength.        Start:  03/20/25    Expected End:  04/19/25            The patient " will improve score on LEFS by 20 points to indicate decreased pain and increased functional level.         Start:  03/20/25    Expected End:  05/19/25            Patient will be independent with home exercise program for home maintenance.        Start:  03/20/25    Expected End:  05/19/25

## 2025-04-28 ENCOUNTER — APPOINTMENT (OUTPATIENT)
Dept: PHYSICAL THERAPY | Facility: CLINIC | Age: 55
End: 2025-04-28
Payer: COMMERCIAL

## 2025-04-29 ENCOUNTER — CLINICAL SUPPORT (OUTPATIENT)
Dept: SLEEP MEDICINE | Facility: CLINIC | Age: 55
End: 2025-04-29
Payer: COMMERCIAL

## 2025-04-29 DIAGNOSIS — G47.33 OSA (OBSTRUCTIVE SLEEP APNEA): ICD-10-CM

## 2025-04-30 VITALS
BODY MASS INDEX: 37.2 KG/M2 | WEIGHT: 231.48 LBS | SYSTOLIC BLOOD PRESSURE: 146 MMHG | HEIGHT: 66 IN | DIASTOLIC BLOOD PRESSURE: 87 MMHG

## 2025-04-30 ASSESSMENT — SLEEP AND FATIGUE QUESTIONNAIRES
HOW LIKELY ARE YOU TO NOD OFF OR FALL ASLEEP WHILE SITTING AND READING: WOULD NEVER DOZE
HOW LIKELY ARE YOU TO NOD OFF OR FALL ASLEEP WHILE SITTING QUIETLY AFTER LUNCH WITHOUT ALCOHOL: WOULD NEVER DOZE
HOW LIKELY ARE YOU TO NOD OFF OR FALL ASLEEP WHILE WATCHING TV: SLIGHT CHANCE OF DOZING
HOW LIKELY ARE YOU TO NOD OFF OR FALL ASLEEP WHILE LYING DOWN TO REST IN THE AFTERNOON WHEN CIRCUMSTANCES PERMIT: MODERATE CHANCE OF DOZING
SITING INACTIVE IN A PUBLIC PLACE LIKE A CLASS ROOM OR A MOVIE THEATER: WOULD NEVER DOZE
ESS-CHAD TOTAL SCORE: 3
HOW LIKELY ARE YOU TO NOD OFF OR FALL ASLEEP WHEN YOU ARE A PASSENGER IN A CAR FOR AN HOUR WITHOUT A BREAK: WOULD NEVER DOZE
HOW LIKELY ARE YOU TO NOD OFF OR FALL ASLEEP IN A CAR, WHILE STOPPED FOR A FEW MINUTES IN TRAFFIC: WOULD NEVER DOZE
HOW LIKELY ARE YOU TO NOD OFF OR FALL ASLEEP WHILE SITTING AND TALKING TO SOMEONE: WOULD NEVER DOZE

## 2025-04-30 NOTE — PROGRESS NOTES
[facility] TECH NOTE:     Patient: Betzaida Jc   MRN//AGE: [id number]  1970  55 y.o.   Technologist: Hannah Bauman   Room: 3   Service Date: 2025        Sleep Testing Location: [facility]: 3    TECHNOLOGIST SLEEP STUDY PROCEDURE NOTE:   This sleep study is being conducted according to the policies and procedures outlined by the AAS accreditation standards.  The sleep study procedure and processes involved during this appointment was explained to the patient/patient’s family, questions were answered. The patient/family verbalized understanding.      The patient is a 55 y.o. year old female scheduled for aSplit Night Study with montage of: standard. She arrived for her appointment.      The study that was ultimately completed was adiagnostic PSG with montage of: standard.    The full study Was completed.  Patient questionnaires completed?: yes     Consents signed? yes    Initial Fall Risk Screening:     Betzaida has not fallen in the last 6 months. Her fall did not result in injury. Betzaida does not have a fear of falling. She does not need assistance with sitting, standing, or walking. She does not need assistance walking in her home. She does not need assistance in an unfamiliar setting. The patient is not using an assistive device.      Brief Study observations: This patient presents as a Split Night Study, AHI=10. She arrived at 8:00 pm by herself. The sleep study procedure was explained to the patient and questions were answered. She verbalized understanding. She did not meet split criteria based on not enough respiratory events. Study was completed as a diagnostic PSG.      Deviation to order/protocol and reason: N/A      If PAP, which was preferred mask/pressure/mode: N/A      Other:None    After the procedure, the patient/family was informed to ensure followup with ordering clinician for testing results.      Technologist: Hannah Bauman

## 2025-05-01 ENCOUNTER — TREATMENT (OUTPATIENT)
Dept: PHYSICAL THERAPY | Facility: CLINIC | Age: 55
End: 2025-05-01
Payer: COMMERCIAL

## 2025-05-01 DIAGNOSIS — M17.12 UNILATERAL PRIMARY OSTEOARTHRITIS, LEFT KNEE: ICD-10-CM

## 2025-05-01 DIAGNOSIS — M25.562 ACUTE PAIN OF LEFT KNEE: ICD-10-CM

## 2025-05-01 PROCEDURE — 97110 THERAPEUTIC EXERCISES: CPT | Mod: GP

## 2025-05-01 NOTE — PROGRESS NOTES
"Physical Therapy    Physical Therapy Treatment    Patient Name: Betzaida Jc  MRN: [id number]  Today's Date: 5/1/2025    Time Entry:   Time Calculation  Start Time: 0935  Stop Time: 1016  Time Calculation (min): 41 min     PT Therapeutic Procedures Time Entry  Therapeutic Exercise Time Entry: 41       Authorization /Certification / Visits allowed:  BWC: 12 TOTAL VISITS 02/19/25 - 05/30/25  Visit 9/12    Assessment:  Able to add 1# to PRE's without difficulty, increase weight on leg press, and add pball bridges. Progressing well.   Plan:   Recheck    Current Problem  1. Acute pain of left knee  Follow Up In Physical Therapy      2. Unilateral primary osteoarthritis, left knee  Follow Up In Physical Therapy          Subjective    Some days it's really good and some days it's not   Precautions   Diabetes, HBP, asthma, heart disease, hx of TIA, baker's cyst , h/o DVT (per pt)   Pain   2-3/10 Post L knee     Objective   Titanium rods in B great toes  0-125` L    Treatments:  Therapeutic Exercise:  Ice massage in prone to Baker's cyst x 5 min, MHP to low back  SRB x 5 min  Quad set 5 sec x 20, knee L  SAQ 1# 5 sec x 20 L  SLR 1#2 x 10 L  DKTC/HS curl on pball x 20  Bridge on pball 2 x 10  Sidelying hip abd 2 x 10  Ccamb 3.5 plates x 5 retro, 2.5 plates x 5 forward  6\" step up w/march x 10 R/L  Slant board stretch 30 sec x 2  LP DL 60# 2 x 10  Supine HS stretch 30 sec x 2     Goals:  Active       PT Problem       Pt will increase L knee AROm to 0-120 to facilitate improved gait mechanics.        Start:  03/20/25    Expected End:  04/19/25            Pt will amb in clinic independently without gait deviations to indicate improved pain, motion, and strength in LLE.        Start:  03/20/25    Expected End:  04/19/25            Pt will negotiate one flight of steps reciprocally to facilitate improved mobility in home and indicate improved LLE strength.        Start:  03/20/25    Expected End:  04/19/25            The patient " will improve score on LEFS by 20 points to indicate decreased pain and increased functional level.         Start:  03/20/25    Expected End:  05/19/25            Patient will be independent with home exercise program for home maintenance.        Start:  03/20/25    Expected End:  05/19/25

## 2025-05-08 ENCOUNTER — APPOINTMENT (OUTPATIENT)
Dept: ORTHOPEDIC SURGERY | Facility: CLINIC | Age: 55
End: 2025-05-08
Payer: COMMERCIAL

## 2025-05-09 ENCOUNTER — TREATMENT (OUTPATIENT)
Dept: PHYSICAL THERAPY | Facility: CLINIC | Age: 55
End: 2025-05-09
Payer: COMMERCIAL

## 2025-05-09 DIAGNOSIS — M25.562 ACUTE PAIN OF LEFT KNEE: ICD-10-CM

## 2025-05-09 DIAGNOSIS — M17.12 UNILATERAL PRIMARY OSTEOARTHRITIS, LEFT KNEE: ICD-10-CM

## 2025-05-09 PROCEDURE — 97110 THERAPEUTIC EXERCISES: CPT | Mod: GP

## 2025-05-09 NOTE — PROGRESS NOTES
"Physical Therapy    Physical Therapy Treatment    Patient Name: Betzaida Jc  MRN: [id number]  Today's Date: 5/9/2025    Time Entry:   Time Calculation  Start Time: 1232  Stop Time: 1310  Time Calculation (min): 38 min     PT Therapeutic Procedures Time Entry  Therapeutic Exercise Time Entry: 23       Authorization /Certification / Visits allowed:  BWC: 12 TOTAL VISITS 02/19/25 - 05/30/25  Visit 10/12    Assessment:  Continues to progress well with reps and resistance, no issues during session today.   Plan:   Recheck for more visits on 12th; progress as able after return to work    Current Problem  1. Acute pain of left knee  Follow Up In Physical Therapy      2. Unilateral primary osteoarthritis, left knee  Follow Up In Physical Therapy          Subjective    Work starts next week,  Precautions   Diabetes, HBP, asthma, heart disease, hx of TIA, baker's cyst , h/o DVT (per pt)   Pain   2-3/10 Post L knee     Objective   Titanium rods in B great toes  0-125` L      Treatments:  Therapeutic Exercise:  SRB x 5 min  Quad set 5 sec x 20, knee L  SAQ 1# 5 sec x 20 L  SLR 1# 2 x 10 L  DKTC/HS curl on pball x 20  Bridge on pball 2 x 10  Sidelying hip abd 1# 2 x 10  LP DL 70# 3 x 10  Ccamb 3.5 plates x 5 retro, 2.5 plates x 5 forward  8\" step up w/march x 10 R/L  Slant board stretch 30 sec x 2  Supine HS stretch 30 sec x 2   Prone knee flexion stretch 30 sec x 2    Goals:  Active       PT Problem       Pt will increase L knee AROm to 0-120 to facilitate improved gait mechanics.        Start:  03/20/25    Expected End:  04/19/25            Pt will amb in clinic independently without gait deviations to indicate improved pain, motion, and strength in LLE.        Start:  03/20/25    Expected End:  04/19/25            Pt will negotiate one flight of steps reciprocally to facilitate improved mobility in home and indicate improved LLE strength.        Start:  03/20/25    Expected End:  04/19/25            The patient will " improve score on LEFS by 20 points to indicate decreased pain and increased functional level.         Start:  03/20/25    Expected End:  05/19/25            Patient will be independent with home exercise program for home maintenance.        Start:  03/20/25    Expected End:  05/19/25

## 2025-05-14 ENCOUNTER — APPOINTMENT (OUTPATIENT)
Dept: PHYSICAL THERAPY | Facility: CLINIC | Age: 55
End: 2025-05-14
Payer: COMMERCIAL

## 2025-05-14 DIAGNOSIS — M17.12 UNILATERAL PRIMARY OSTEOARTHRITIS, LEFT KNEE: ICD-10-CM

## 2025-05-14 DIAGNOSIS — M25.562 ACUTE PAIN OF LEFT KNEE: ICD-10-CM

## 2025-05-14 PROCEDURE — 97110 THERAPEUTIC EXERCISES: CPT | Mod: GP

## 2025-05-14 NOTE — PROGRESS NOTES
"Physical Therapy    Physical Therapy Treatment    Patient Name: Betzaida Jc  MRN: [id number]  Today's Date: 5/14/2025    Time Entry:   Time Calculation  Start Time: 1532  Stop Time: 1615  Time Calculation (min): 43 min     PT Therapeutic Procedures Time Entry  Therapeutic Exercise Time Entry: 40       Authorization /Certification / Visits allowed:  C: 12 TOTAL VISITS 02/19/25 - 05/30/25  Visit 11/12    Assessment:  Did not progress today due to recent return to work, but tolerated program well. Just fatigued EOS and encouraged her to ice daily again now that she's back to work.  Plan:   Recheck; Tap down, more visits    Current Problem  1. Acute pain of left knee  Follow Up In Physical Therapy      2. Unilateral primary osteoarthritis, left knee  Follow Up In Physical Therapy            Subjective    Just tired, but knee is okay after work yesterday and today  Precautions   Diabetes, HBP, asthma, heart disease, hx of TIA, baker's cyst , h/o DVT (per pt)   Pain   2-3/10 Post L knee     Objective   Titanium rods in B great toes  0-125` L      Treatments:  Therapeutic Exercise:  SRB x 5 min  Quad set 5 sec x 20, knee L  SAQ 1# 5 sec x 20 L  SLR 1# 2 x 10 L  DKTC/HS curl on pball x 20  Bridge on pball 2 x 10  Sidelying hip abd 1# 2 x 10  Ccamb 3.5 plates x 5 retro, 2.5 plates x 5 forward  Slant board stretch 30 sec x 4  8\" step up w/march x 10 L  LP DL 70# 3 x 10  Supine HS stretch 30 sec x 2   Prone knee flexion stretch 30 sec x 2    Goals:  Active       PT Problem       Pt will increase L knee AROm to 0-120 to facilitate improved gait mechanics.        Start:  03/20/25    Expected End:  04/19/25            Pt will amb in clinic independently without gait deviations to indicate improved pain, motion, and strength in LLE.        Start:  03/20/25    Expected End:  04/19/25            Pt will negotiate one flight of steps reciprocally to facilitate improved mobility in home and indicate improved LLE strength.        " Start:  03/20/25    Expected End:  04/19/25            The patient will improve score on LEFS by 20 points to indicate decreased pain and increased functional level.         Start:  03/20/25    Expected End:  05/19/25            Patient will be independent with home exercise program for home maintenance.        Start:  03/20/25    Expected End:  05/19/25

## 2025-05-19 ENCOUNTER — APPOINTMENT (OUTPATIENT)
Dept: PHYSICAL THERAPY | Facility: CLINIC | Age: 55
End: 2025-05-19
Payer: COMMERCIAL

## 2025-05-22 ENCOUNTER — APPOINTMENT (OUTPATIENT)
Dept: PHYSICAL THERAPY | Facility: CLINIC | Age: 55
End: 2025-05-22
Payer: COMMERCIAL

## 2025-05-27 ENCOUNTER — APPOINTMENT (OUTPATIENT)
Dept: PHYSICAL THERAPY | Facility: CLINIC | Age: 55
End: 2025-05-27
Payer: COMMERCIAL

## 2025-05-29 ENCOUNTER — TREATMENT (OUTPATIENT)
Dept: PHYSICAL THERAPY | Facility: CLINIC | Age: 55
End: 2025-05-29
Payer: COMMERCIAL

## 2025-05-29 DIAGNOSIS — M25.562 ACUTE PAIN OF LEFT KNEE: ICD-10-CM

## 2025-05-29 DIAGNOSIS — M17.12 UNILATERAL PRIMARY OSTEOARTHRITIS, LEFT KNEE: ICD-10-CM

## 2025-05-29 PROCEDURE — 97110 THERAPEUTIC EXERCISES: CPT | Mod: GP

## 2025-05-29 NOTE — PROGRESS NOTES
Physical Therapy    Physical Therapy Treatment    Patient Name: Betzaida Jc  MRN: [id number]  Today's Date: 5/29/2025    Time Entry:   Time Calculation  Start Time: 0934  Stop Time: 1017  Time Calculation (min): 43 min     PT Therapeutic Procedures Time Entry  Therapeutic Exercise Time Entry: 43       Authorization /Certification / Visits allowed:  BWC: 12 TOTAL VISITS 02/19/25 - 05/30/25  Visit 12/12    Assessment:  Pt rechecked today and shows great progress with ROM since initial eval. She is still working on LLE strength gains and functional activities like stairs and ADLs like donning LE garments. She also continues to demonstrate gait deficits, making longer distance walking for work and IADLs difficult.  Pt continues to require skilled therapy services to address remaining impairments and facilitate improved functional level.   Plan:   Tap down, cont per insurance    Current Problem  1. Acute pain of left knee  Follow Up In Physical Therapy      2. Unilateral primary osteoarthritis, left knee  Follow Up In Physical Therapy              Subjective    Being on my feet for 10 hours a day is exhausting. The knee is sore but doing okay. Stairs still are a real challenge.  Precautions   Diabetes, HBP, asthma, heart disease, hx of TIA, baker's cyst , h/o DVT (per pt)   Pain   2-3/10 Post L knee     Objective     ROM/Flexibility:                          Knee Flexion R / L :      120 / 127                          Knee Extension R / L :  5 / 5                               Girth Measurements/Swelling :                          Mid patellar R / L : 43 cm / 45 cm                          Around malleoli R / L : 24 / 24 cm                 Strength R / L :                          Hip Flexion:          5 / 5                          Hip Extension:     5 / 4+                          Hip Abduction:    5 / 4                          Hip Adduction:    5 / 4+                          Knee Extension:   5 / 4+                "           Knee Flexion:       5 / 4+                          Ankle DF:             5 / 5                          Ankle PF:              5 / 4+                 Neurological: Pt endorses slight numbness throughout LLE                 Gait: Amb independently but with decreased L stance time                 Outcome Measure:                          LEFS : 27 / 80    Treatments:  Therapeutic Exercise:  SRB x 5 min  Recheck  Quad set 5 sec x 20, knee L  SAQ 1# 5 sec x 20 L  SLR 1# 2 x 10 L  Sidelying hip abd 1# 2 x 10  DKTC/HS curl on pball x 20  Bridge on pball 2 x 10  8\" step up w/march x 10 L  SLS w/single FTT 10-15 sec x 5 L  LP DL 70# 3 x 10  Ccamb 4 plates x 5 retro, 3 plates x 5 forward  Ccamb 3 plates x 3 lateral ea way    Not today:  Slant board stretch 30 sec x 4  Supine HS stretch 30 sec x 2   Prone knee flexion stretch 30 sec x 2    Goals:  Active       PT Problem       Pt will increase L knee AROm to 0-120 to facilitate improved gait mechanics.  (Met)       Start:  03/20/25    Expected End:  04/19/25    Resolved:  05/29/25         Pt will amb in clinic independently without gait deviations to indicate improved pain, motion, and strength in LLE.  (Progressing)       Start:  03/20/25    Expected End:  04/19/25            Pt will negotiate one flight of steps reciprocally to facilitate improved mobility in home and indicate improved LLE strength.  (Progressing)       Start:  03/20/25    Expected End:  04/19/25            The patient will improve score on LEFS by 20 points to indicate decreased pain and increased functional level.         Start:  03/20/25    Expected End:  05/19/25            Patient will be independent with home exercise program for home maintenance.  (Progressing)       Start:  03/20/25    Expected End:  05/19/25                "

## 2025-06-02 ENCOUNTER — OFFICE VISIT (OUTPATIENT)
Dept: ORTHOPEDIC SURGERY | Facility: HOSPITAL | Age: 55
End: 2025-06-02
Payer: COMMERCIAL

## 2025-06-02 ENCOUNTER — HOSPITAL ENCOUNTER (OUTPATIENT)
Dept: RADIOLOGY | Facility: HOSPITAL | Age: 55
Discharge: HOME | End: 2025-06-02
Payer: COMMERCIAL

## 2025-06-02 VITALS — BODY MASS INDEX: 36.16 KG/M2 | HEIGHT: 66 IN | WEIGHT: 225 LBS

## 2025-06-02 DIAGNOSIS — M25.561 ACUTE PAIN OF RIGHT KNEE: ICD-10-CM

## 2025-06-02 PROCEDURE — 73564 X-RAY EXAM KNEE 4 OR MORE: CPT | Mod: RIGHT SIDE | Performed by: RADIOLOGY

## 2025-06-02 PROCEDURE — 73564 X-RAY EXAM KNEE 4 OR MORE: CPT | Mod: RT

## 2025-06-02 PROCEDURE — 4010F ACE/ARB THERAPY RXD/TAKEN: CPT | Performed by: PHYSICIAN ASSISTANT

## 2025-06-02 PROCEDURE — 99213 OFFICE O/P EST LOW 20 MIN: CPT | Performed by: PHYSICIAN ASSISTANT

## 2025-06-02 PROCEDURE — 3044F HG A1C LEVEL LT 7.0%: CPT | Performed by: PHYSICIAN ASSISTANT

## 2025-06-02 PROCEDURE — 3008F BODY MASS INDEX DOCD: CPT | Performed by: PHYSICIAN ASSISTANT

## 2025-06-02 RX ORDER — METHYLPREDNISOLONE 4 MG/1
TABLET ORAL
Qty: 21 TABLET | Refills: 0 | Status: SHIPPED | OUTPATIENT
Start: 2025-06-02

## 2025-06-02 ASSESSMENT — PAIN DESCRIPTION - DESCRIPTORS: DESCRIPTORS: SHARP;ACHING

## 2025-06-02 ASSESSMENT — PAIN SCALES - GENERAL: PAINLEVEL_OUTOF10: 10 - WORST POSSIBLE PAIN

## 2025-06-02 ASSESSMENT — PAIN - FUNCTIONAL ASSESSMENT: PAIN_FUNCTIONAL_ASSESSMENT: 0-10

## 2025-06-02 NOTE — PROGRESS NOTES
"HPI  This is a pleasant 55 y.o. female here today in the [facility] for right knee pain.  The pain started Saturday after getting into a car.  [place] a [place].  Pain since.  She had a left TKA 6 months ago. That is doing well.  Right knee pian is constant and worse with bending.         Medical History[1]    Surgical History[2]    Social History[3]        ROS  Reviewed and all pertinent positives mentioned in HPI.      EXAM  Patient is in no acute distress.  They are alert and oriented x3.  They are of normal mood and affect.  They are in no acute distress.  The patient's limb is warm and well-perfused.  They have intact sensation to light touch in all lower extremity dermatomes.  There is a minimal effusion.    The patient's quadriceps and hamstring strength is 5 of 5.    The patient can do a straight leg raise with no radicular pain.  negative lachmans. negative posterior drawer.  There is 2+ patellofemoral crepitus.   The knee is stable to varus and valgus stress at both 0 and 30°.  There is tenderness along the medial joint line.       IMAGING  Imaging reviewed today reveal no gross fracture or dislocation.  Advanced DJD        ASSESSMENT/PLAN  Patient with right knee osteoarthritis flared after twisting    Will start with a Medrol dose pack. She has tolerated in the past.  Will monitor blood sugars.  Consider CSI if pain persists.    Germaine Emanuel PA-C         [1]   Past Medical History:  Diagnosis Date    Asthma     \"Severe persistent asthma\" per pulmonology    Chest pain     Numerous work ups for CP.    Chronic allergic rhinitis     Chronic constipation     Collapse of right lung     Chronic RML collapse with associated bronchiectasis, seen on CT    Dependence on nocturnal oxygen therapy     2-3 L O2 nightly    Diverticulosis     Hiatal hernia with GERD     4cm per EGD 8/2023    History of venous thromboembolism 06/2010    6-8 weeks post L Achilles repair, LE DVT and unilateral PE. Treated w/ anticoagulation, then " found to have possible PE in other lung    Hyperlipidemia     Hypertension     Irregular heart beat     Migraine     Morbid obesity with BMI of 45.0-49.9, adult (Multi)     BRIDGER (obstructive sleep apnea)     partially CPAP compliant w/ 2-3L O2 bleed in    Paroxysmal atrial fibrillation (Multi)     Right renal stone     6 mm stone midpole caliceal system right kidney without obstruction, last seen 2023    Type 2 diabetes mellitus    [2]   Past Surgical History:  Procedure Laterality Date    ACHILLES TENDON SURGERY Left 2010    BI BREAST RIGHT CORE NEEDLE BIOPSY       SECTION, CLASSIC      COLONOSCOPY      CT ANGIO CORONARY ART WITH HEARTFLOW IF SCORE >30%  2022    CT HEART CORONARY ANGIOGRAM 2022 [facility]    ESOPHAGOSCOPY / EGD  2023    KNEE ARTHROSCOPY W/ DEBRIDEMENT Right     MR HEAD ANGIO WO IV CONTRAST  2019    MR HEAD ANGIO WO IV CONTRAST 2019 [facility]    MR NECK ANGIO WO IV CONTRAST  2019    MR NECK ANGIO WO IV CONTRAST 2019 [facility]    PARTIAL HYSTERECTOMY  2006    fibroids/bleeding    SHOULDER ARTHROSCOPY W/ ROTATOR CUFF REPAIR Left 2020    TOE SURGERY Right 2023    Digital arthroplasties with pin fixation, toes 2, 3, right foot.   [3]   Social History  Tobacco Use    Smoking status: Never    Smokeless tobacco: Never   Vaping Use    Vaping status: Never Used   Substance Use Topics    Alcohol use: Yes     Alcohol/week: 2.0 standard drinks of alcohol     Types: 2 Standard drinks or equivalent per week    Drug use: Never

## 2025-06-05 ENCOUNTER — CONSULT (OUTPATIENT)
Dept: ALLERGY | Facility: CLINIC | Age: 55
End: 2025-06-05
Payer: COMMERCIAL

## 2025-06-05 VITALS
WEIGHT: 225 LBS | DIASTOLIC BLOOD PRESSURE: 84 MMHG | HEIGHT: 66 IN | OXYGEN SATURATION: 98 % | SYSTOLIC BLOOD PRESSURE: 144 MMHG | BODY MASS INDEX: 36.16 KG/M2 | HEART RATE: 74 BPM | RESPIRATION RATE: 18 BRPM | TEMPERATURE: 98.4 F

## 2025-06-05 DIAGNOSIS — L50.9 URTICARIA OF ENTIRE BODY: ICD-10-CM

## 2025-06-05 DIAGNOSIS — Z86.718 HISTORY OF DVT (DEEP VEIN THROMBOSIS): Primary | ICD-10-CM

## 2025-06-05 DIAGNOSIS — L50.9 URTICARIA: ICD-10-CM

## 2025-06-05 PROCEDURE — 99204 OFFICE O/P NEW MOD 45 MIN: CPT | Performed by: ALLERGY & IMMUNOLOGY

## 2025-06-05 RX ORDER — CETIRIZINE HYDROCHLORIDE 10 MG/1
10 TABLET ORAL EVERY 12 HOURS
Qty: 60 TABLET | Refills: 3 | Status: SHIPPED | OUTPATIENT
Start: 2025-06-05

## 2025-06-05 RX ORDER — HYDROXYZINE HYDROCHLORIDE 25 MG/1
25 TABLET, FILM COATED ORAL 3 TIMES DAILY PRN
Qty: 30 TABLET | Refills: 3 | Status: SHIPPED | OUTPATIENT
Start: 2025-06-05 | End: 2025-07-15

## 2025-06-05 RX ORDER — CETIRIZINE HYDROCHLORIDE 10 MG/1
10 TABLET ORAL
COMMUNITY
End: 2025-06-05 | Stop reason: SDUPTHER

## 2025-06-05 NOTE — PROGRESS NOTES
Patient ID: Betzaida Jc is a 55 y.o. female.     Chief Complaint: NPV referred by Dr. Hodge  History Of Present Illness  Betzaida Jc is a 55 y.o. female with PMx whole body itching presenting for consultation.       Food Allergy  No    Eczema/ Atopic Dermatitis  Knee replacement 2/19/25-she did have pain after and did not notice any itch  Narcotics for about a month and a half-itch started.  Stopped narcotics and just used Tylenol.  Palms itching/Ankles itching, whole body.  Saw derm. Said no nickel allergy.  Gave hydroxyzine which did help.  Then tried Cetirizine, also worked.  Forgot to take, then started to itch and took Hydroxyzine again.  When she scratches she has hives appear.    Sees [facility] for skin issues (Jamee)    Absolutely no change in body products, detergents.  She did lose 86 lbs prior to surgery.      Asthma  History of [facility] because of insurance issue.  Dr. Hodge managed her asthma, weaned from prednisone.  Asthma diagnosed as adult  History of BRIDGER  Current medication: as needed rescue inhaler, Montelukast.  Hospitalizations/ER visits in the last year: no  Oral steroid/systemic steroids in the last year: not for asthma, but did get 2 days ago for her knee that was not replaced  Triggers: ? Allergy,   Smoker or Smoke exposure: never  Family history:    Not known    Rhinoconjunctivitis  [place] and spider cause enlarged welts  No known environmental allergy    Drug Allergy   Sulfa and PCN allergy history    Insect Allergy   No    Infections  No history of frequent or recurrent infections      Review of Systems    Pertinent positives and negatives have been assessed in the HPI. All other systems have been reviewed and are negative except as noted in the HPI.    Allergies  Penicillins and Sulfa (sulfonamide antibiotics)    Past Medical History  She has a past medical history of Asthma, Chest pain, Chronic allergic rhinitis, Chronic constipation, Collapse of right  "lung, Dependence on nocturnal oxygen therapy, Diverticulosis, Hiatal hernia with GERD, History of venous thromboembolism (2010), Hyperlipidemia, Hypertension, Irregular heart beat, Migraine, Morbid obesity with BMI of 45.0-49.9, adult (Multi), BRIDGER (obstructive sleep apnea), Paroxysmal atrial fibrillation (Multi), Right renal stone, and Type 2 diabetes mellitus.    Family History  Family History[1]    Surgical History  She has a past surgical history that includes Knee arthroscopy w/ debridement (Right); MR angio head wo IV contrast (2019); MR angio neck wo IV contrast (2019); CT angio coronary art with heartflow if score >30% (2022); Shoulder arthroscopy w/ rotator cuff repair (Left, 2020); Esophagoscopy / EGD (2023); Colonoscopy; Toe Surgery (Right, 2023); Partial hysterectomy (2006); BI breast right core needle biopsy; Achilles tendon surgery (Left, 2010); and  section, classic.    Social/Environmental History  She reports that she has never smoked. She has never used smokeless tobacco. She reports current alcohol use of about 2.0 standard drinks of alcohol per week. She reports that she does not use drugs.    Home: Lives in a house   Floors: Wood, some carpet  Air Conditioning: Central  Smoker: No  Pets: none  Infestations: No  Molds: No  Occupation: [employer] for the last 22 yrs    MEDICATIONS  Medications Ordered Prior to Encounter[2]      Physical Exam  Visit Vitals  /84   Pulse 74   Temp 36.9 °C (98.4 °F) (Temporal)   Resp 18   Ht 1.676 m (5' 6\")   Wt 102 kg (225 lb)   SpO2 98%   BMI 36.32 kg/m²   OB Status Postmenopausal   Smoking Status Never   BSA 2.18 m²       Wt Readings from Last 1 Encounters:   25 102 kg (225 lb)       Physical Exam    General: Well appearing, no acute distress  Head: Normocephalic, atraumatic, neck supple without lymphadenopathy  Eyes: EOMI, non-injected  Nose: No nasal crease, nares patent, slightly boggy turbinates, " minimal discharge  Throat: Normal dentition, no erythema  Heart: Regular rate and rhythm  Lungs: Clear to auscultation bilaterally, effort normal  Abdomen: Soft, non-tender, normal bowel sounds  Extremities: Moves all extremities. No CCE. Well healed scar on the left knee.  Skin: No rashes/lesions  Psych: normal mood and affect    LAB RESULTS:  CBC:  Recent Labs     02/22/25 1727 02/20/25 0523 02/03/25  1510 01/16/25  1000 09/19/24  0823   WBC 6.3 7.7 4.6   < > 4.8   HGB 9.6* 8.9* 11.4*   < > 10.7*   HCT 31.4* 28.3* 36.6   < > 34.5*    256 356   < > 312   MCV 89 88 88   < > 94   EOSABS 0.00  --  0.00  --  0.18    < > = values in this interval not displayed.       CMP:  Recent Labs     02/22/25 1727 02/20/25 0523 02/03/25  1510 05/30/24  1610 04/30/24  0028 03/30/24  1202    136 140   < > 141 141   K 3.6 4.3 3.8   < > 3.4* 3.9    104 99   < > 101 104   CO2 28 23 33*   < > 30 28   ANIONGAP 14 13 12   < > 13 13   BUN 9 19 15   < > 23 23   CREATININE 0.72 0.77 0.80   < > 1.14* 0.97   EGFR >90 >90 88   < > 58* 70   MG 1.64  --   --   --  1.62 2.06    < > = values in this interval not displayed.     Recent Labs     02/22/25 1727 02/03/25  1510 01/16/25  1000 07/07/22  2108 06/23/22  0558 12/23/21  1540 09/26/21  1126 09/28/18  1139 08/23/18  0020   ALBUMIN 3.5 4.1 3.4   < > 3.8   < > 3.6   < > 3.8   ALKPHOS 68 89 85   < > 71   < > 68   < > 72   ALT 19 11 14   < > 18   < > 19   < > 13   AST 20 13 14   < > 17   < > 21   < > 13   BILITOT 0.5 0.4 0.3   < > 0.6   < > 0.4   < > 0.3   LIPASE  --   --   --   --  19  --  22  --  23    < > = values in this interval not displayed.       ALLERGY: testing from 2022  Lab Results   Component Value Date    ICIGE 39.7 03/30/2022    WHITEASH <0.10 03/30/2022    SILVERBIRCH <0.10 03/30/2022    BOXELDER <0.10 03/30/2022    MOUNTJUNIPER <0.10 03/30/2022    COTTONWOOD <0.10 03/30/2022    ELM <0.10 03/30/2022    MULBERRY <0.10 03/30/2022    PECANHICKORY <0.10 03/30/2022     MAPLESYCAMOR <0.10 03/30/2022    OAK <0.10 03/30/2022    BERMUDAGR <0.10 03/30/2022    JOHNSONGR <0.10 03/30/2022    BLUEGRASS <0.10 03/30/2022    TIMOTHYGRASS <0.10 03/30/2022     Lab Results   Component Value Date    LAMBQUART <0.10 03/30/2022    PIGWEED <0.10 03/30/2022    COMRAGWEED <0.10 03/30/2022    SHEEPSOR <0.10 03/30/2022    PLANTAIN <0.10 03/30/2022    CATEPI <0.10 03/30/2022    DOGEPI <0.10 03/30/2022    ALTERNA <0.10 03/30/2022    CLADHERB <0.10 03/30/2022    ICA04 <0.10 03/30/2022    DERMFAR <0.10 03/30/2022    DERMPTE <0.10 03/30/2022    COCKR <0.10 03/30/2022     Recent Labs     03/30/22  0850   ICIGE 39.7     Recent Labs     02/22/25  1727 02/03/25  1510 09/19/24  0823   EOSABS 0.00 0.00 0.18       IMMUNO:   Recent Labs     03/30/22  0850 08/11/20  0628   IGG 1,270 1,180       COAG:   Recent Labs     01/16/25  1000 04/26/23  1320 06/23/22  0640   INR 1.3* 1.0 1.3*       ABO:   Recent Labs     12/16/20  0838   ABO O       HEME/ENDO:  Recent Labs     02/03/25  1509 11/21/24  0730 07/02/24  1230 03/30/24  1306 09/25/23  0757 05/26/23  0740 01/22/20  0846 12/04/19  1416   TSH  --   --   --  0.60  --  2.14  --  1.79   HGBA1C 5.7* 6.0* 6.2*  --    < > 6.4*   < >  --     < > = values in this interval not displayed.         Assessment/Plan     Betzaida is a 56 yo with multiple medical issues in the past several years. She is s/p knee replacement in February.  Complains of itching that is relieved by antihistamines.  This occurred after her knee surgery raising concern for allergy to something associated, but the issue is still persisteting. There has been no previous history of environmental allergy or contact allergy. She has discontinue narcotic pain relievers and symptoms persist.  She describes dermatographia. Symptoms relieved by antihistamine  We discussed obtaining additional labs for immune trigger of itch/dermatographia.  I recommend BID Zyrtec. OK for as needed Hydroxyzine for breakthrough, but  not the preferred treatment due to short acting nature and sedation side effect which is discussed with patient.  I will call results of labs and have patient follow up in about 4 wk.  Fanny Cuba DO          [1]   Family History  Problem Relation Name Age of Onset    Thyroid cancer Mother      Kidney cancer Father      Diabetes Other      Hypertension Other      Stroke Other     [2]   Current Outpatient Medications on File Prior to Visit   Medication Sig Dispense Refill    albuterol 90 mcg/actuation inhaler every 4 hours if needed.      Alcohol Prep Pads pads, medicated USE TO TEST BLOOD SUGAR ONCE DAILY      apixaban (Eliquis) 5 mg tablet Take 1 tablet (5 mg) by mouth twice a day.      atorvastatin (Lipitor) 40 mg tablet Take 1 tablet (40 mg) by mouth once daily.      benralizumab (Fasenra) 30 mg/mL injection Inject 1 mL (30 mg) under the skin every 8 (eight) weeks. 1 each 6    budesonide-formoterol (Symbicort) 80-4.5 mcg/actuation inhaler Inhale 2 puffs 2 times a day as needed (shortness of breath). Rinse mouth with water after use to reduce aftertaste and incidence of candidiasis. Do not swallow. 10.2 g 5    cetirizine (ZyrTEC) 10 mg tablet Take 1 tablet (10 mg) by mouth once daily in the morning. Take before meals.      cholecalciferol (Vitamin D-3) 1.25 mg (50,000 units) capsule Take by mouth.      flecainide (Tambocor) 100 mg tablet Take 1 tablet (100 mg) by mouth 2 times a day.      linaCLOtide (Linzess) 145 mcg capsule Take 1 tablet by mouth once daily as needed.      lisinopril 40 mg tablet Take 1 tablet (40 mg) by mouth once daily.      meclizine (Antivert) 25 mg tablet Take 1 tablet (25 mg) by mouth every 12 hours if needed.      metFORMIN (Glucophage) 500 mg tablet TAKE 1 TABLET BY MOUTH TWICE A DAY WITH A MEAL FOR 30 DAYS      metoprolol tartrate (Lopressor) 100 mg tablet Take 1 tablet (100 mg) by mouth 2 times a day.      montelukast (Singulair) 10 mg tablet TAKE 1 TABLET (10 MG) BY  MOUTH ONCE EVERY 24 HOURS.      omeprazole (PriLOSEC) 40 mg DR capsule 1 capsule (40 mg) once every 24 hours.      ondansetron (Zofran) 4 mg tablet Take 1 tablet (4 mg) by mouth every 6 hours if needed for nausea or vomiting.      OneTouch Delica Plus Lancet 33 gauge misc USE 1 LANCET TO TEST BLOOD SUGAR THREE TIMES DAILY      OneTouch Verio Flex meter misc TEST BLOOD SUGAR ONCE DAILY      OneTouch Verio test strips strip USE TO TEST BLOOD SUGAR ONCE DAILY      polyethylene glycol (Glycolax, Miralax) 17 gram/dose powder Mix 1 cap (17g) of powder into 8 ounces of fluid and drink once daily. 510 g 0    sertraline (Zoloft) 25 mg tablet Take 1 tablet (25 mg) by mouth once daily.      torsemide (Demadex) 20 mg tablet Take 1 tablet (20 mg) by mouth once daily.      apixaban (Eliquis) 2.5 mg tablet Take 1 tablet (2.5 mg) by mouth 2 times a day for 5 days. After 5 days, resume regular home dose. 10 tablet 0    diphenhydrAMINE (BENADryl) 25 mg capsule Take 1-2 capsules (25-50 mg) by mouth every 4 hours if needed.      methylPREDNISolone (Medrol Dospak) 4 mg tablets Use as directed by package instructions (Patient not taking: Reported on 6/5/2025) 21 tablet 0    pantoprazole (ProtoNix) 40 mg EC tablet Take 1 tablet (40 mg) by mouth once daily. Do not crush, chew, or split. 30 tablet 0     No current facility-administered medications on file prior to visit.

## 2025-06-08 LAB
25(OH)D3+25(OH)D2 SERPL-MCNC: 53 NG/ML (ref 30–100)
ANA SER QL IF: NORMAL
B2 GLYCOPROT1 IGA SER-ACNC: <2 U/ML
B2 GLYCOPROT1 IGG SER-ACNC: <2 U/ML
B2 GLYCOPROT1 IGM SER-ACNC: <2 U/ML
BASOPHILS # BLD AUTO: 51 CELLS/UL (ref 0–200)
BASOPHILS NFR BLD AUTO: 0.9 %
CARDIOLIPIN IGA SER IA-ACNC: <2 APL-U/ML
CARDIOLIPIN IGG SER IA-ACNC: <2 GPL-U/ML
CARDIOLIPIN IGM SER IA-ACNC: <2 MPL-U/ML
EOSINOPHIL # BLD AUTO: 182 CELLS/UL (ref 15–500)
EOSINOPHIL NFR BLD AUTO: 3.2 %
ERYTHROCYTE [DISTWIDTH] IN BLOOD BY AUTOMATED COUNT: 14.4 % (ref 11–15)
HCT VFR BLD AUTO: 35.9 % (ref 35–45)
HGB BLD-MCNC: 11.2 G/DL (ref 11.7–15.5)
LYMPHOCYTES # BLD AUTO: 2537 CELLS/UL (ref 850–3900)
LYMPHOCYTES NFR BLD AUTO: 44.5 %
MCH RBC QN AUTO: 27.9 PG (ref 27–33)
MCHC RBC AUTO-ENTMCNC: 31.2 G/DL (ref 32–36)
MCV RBC AUTO: 89.3 FL (ref 80–100)
MONOCYTES # BLD AUTO: 701 CELLS/UL (ref 200–950)
MONOCYTES NFR BLD AUTO: 12.3 %
NEUTROPHILS # BLD AUTO: 2229 CELLS/UL (ref 1500–7800)
NEUTROPHILS NFR BLD AUTO: 39.1 %
PLATELET # BLD AUTO: 346 THOUSAND/UL (ref 140–400)
PMV BLD REES-ECKER: 9.5 FL (ref 7.5–12.5)
RBC # BLD AUTO: 4.02 MILLION/UL (ref 3.8–5.1)
THYROGLOB AB SERPL-ACNC: <1 IU/ML
THYROPEROXIDASE AB SERPL-ACNC: <1 IU/ML
TRYPTASE SERPL-MCNC: NORMAL NG/ML
WBC # BLD AUTO: 5.7 THOUSAND/UL (ref 3.8–10.8)

## 2025-06-09 ENCOUNTER — APPOINTMENT (OUTPATIENT)
Dept: PHYSICAL THERAPY | Facility: CLINIC | Age: 55
End: 2025-06-09
Payer: COMMERCIAL

## 2025-06-09 LAB
25(OH)D3+25(OH)D2 SERPL-MCNC: 53 NG/ML (ref 30–100)
ANA PAT SER IF-IMP: ABNORMAL
ANA SER QL IF: POSITIVE
ANA TITR SER IF: ABNORMAL TITER
B2 GLYCOPROT1 IGA SER-ACNC: <2 U/ML
B2 GLYCOPROT1 IGG SER-ACNC: <2 U/ML
B2 GLYCOPROT1 IGM SER-ACNC: <2 U/ML
BASOPHILS # BLD AUTO: 51 CELLS/UL (ref 0–200)
BASOPHILS NFR BLD AUTO: 0.9 %
CARDIOLIPIN IGA SER IA-ACNC: <2 APL-U/ML
CARDIOLIPIN IGG SER IA-ACNC: <2 GPL-U/ML
CARDIOLIPIN IGM SER IA-ACNC: <2 MPL-U/ML
EOSINOPHIL # BLD AUTO: 182 CELLS/UL (ref 15–500)
EOSINOPHIL NFR BLD AUTO: 3.2 %
ERYTHROCYTE [DISTWIDTH] IN BLOOD BY AUTOMATED COUNT: 14.4 % (ref 11–15)
HCT VFR BLD AUTO: 35.9 % (ref 35–45)
HGB BLD-MCNC: 11.2 G/DL (ref 11.7–15.5)
LYMPHOCYTES # BLD AUTO: 2537 CELLS/UL (ref 850–3900)
LYMPHOCYTES NFR BLD AUTO: 44.5 %
MCH RBC QN AUTO: 27.9 PG (ref 27–33)
MCHC RBC AUTO-ENTMCNC: 31.2 G/DL (ref 32–36)
MCV RBC AUTO: 89.3 FL (ref 80–100)
MONOCYTES # BLD AUTO: 701 CELLS/UL (ref 200–950)
MONOCYTES NFR BLD AUTO: 12.3 %
NEUTROPHILS # BLD AUTO: 2229 CELLS/UL (ref 1500–7800)
NEUTROPHILS NFR BLD AUTO: 39.1 %
PLATELET # BLD AUTO: 346 THOUSAND/UL (ref 140–400)
PMV BLD REES-ECKER: 9.5 FL (ref 7.5–12.5)
RBC # BLD AUTO: 4.02 MILLION/UL (ref 3.8–5.1)
THYROGLOB AB SERPL-ACNC: <1 IU/ML
THYROPEROXIDASE AB SERPL-ACNC: <1 IU/ML
TRYPTASE SERPL-MCNC: 4.2 MCG/L
WBC # BLD AUTO: 5.7 THOUSAND/UL (ref 3.8–10.8)

## 2025-06-10 DIAGNOSIS — R76.8 ELEVATED ANTINUCLEAR ANTIBODY (ANA) LEVEL: Primary | ICD-10-CM

## 2025-06-12 ENCOUNTER — APPOINTMENT (OUTPATIENT)
Dept: PHYSICAL THERAPY | Facility: CLINIC | Age: 55
End: 2025-06-12
Payer: COMMERCIAL

## 2025-06-13 ENCOUNTER — TREATMENT (OUTPATIENT)
Dept: PHYSICAL THERAPY | Facility: CLINIC | Age: 55
End: 2025-06-13
Payer: COMMERCIAL

## 2025-06-13 DIAGNOSIS — M17.12 UNILATERAL PRIMARY OSTEOARTHRITIS, LEFT KNEE: ICD-10-CM

## 2025-06-13 DIAGNOSIS — M25.562 ACUTE PAIN OF LEFT KNEE: ICD-10-CM

## 2025-06-13 PROCEDURE — 97110 THERAPEUTIC EXERCISES: CPT | Mod: GP

## 2025-06-13 NOTE — PROGRESS NOTES
"Physical Therapy    Physical Therapy Treatment    Patient Name: Betzaida Jc  MRN: [id number]  Today's Date: 6/13/2025    Time Entry:   Time Calculation  Start Time: 1245  Stop Time: 1335  Time Calculation (min): 50 min     PT Therapeutic Procedures Time Entry  Therapeutic Exercise Time Entry: 50       Auth: 8 visits approved from 6/6/25 to 8/8/25  Visit Count: 1/8    Assessment:  Pt shows slightly decreased ROM due to her return to work but was able to progress to include new therapeutic exercises since last visit. She was able to complete most of the new exercises with no issues.  Plan:   Tap down    Current Problem  1. Acute pain of left knee  Follow Up In Physical Therapy      2. Unilateral primary osteoarthritis, left knee  Follow Up In Physical Therapy              Subjective    \"Pain, lots of pain in both of my knees... just feels achy\"  Pt continues to report difficulty with stairs and with standing for long periods of time at work.   Precautions   Diabetes, HBP, asthma, heart disease, hx of TIA, baker's cyst , h/o DVT (per pt)   Pain   10/10 B knee     Objective     ROM/Flexibility:                          Knee Flexion R / L : 105 / 122                          Knee Extension L : 5  Treatments:  Therapeutic Exercise:  SRB x 5 min  Quad set 5 sec x 20, knee L  SAQ 1# 5 sec x 20 L  SLR 1# 2 x 10 L  Sidelying hip abd 1# 2 x 10  DKTC/HS curl on pball x 20  Bridge on pball 2 x 10  Supine HS stretch 30 sec x 2   Prone knee flexion stretch 30 sec x 2  8\" step up w/march x 10 L  SLS w/single FTT 10-15 sec x 5 L (3 then discontinued due to pain)  LP DL 70# 3 x 10  Ccamb 4 plates x 5 retro, 3 plates x 5 forward  Ccamb 3 plates x 3 lateral ea way  Slant board stretch 30 sec x 4    Goals:  Active       PT Problem       Pt will increase L knee AROm to 0-120 to facilitate improved gait mechanics.  (Met)       Start:  03/20/25    Expected End:  04/19/25    Resolved:  05/29/25         Pt will amb in clinic independently " without gait deviations to indicate improved pain, motion, and strength in LLE.  (Progressing)       Start:  03/20/25    Expected End:  04/19/25            Pt will negotiate one flight of steps reciprocally to facilitate improved mobility in home and indicate improved LLE strength.  (Progressing)       Start:  03/20/25    Expected End:  04/19/25            The patient will improve score on LEFS by 20 points to indicate decreased pain and increased functional level.         Start:  03/20/25    Expected End:  05/19/25            Patient will be independent with home exercise program for home maintenance.  (Progressing)       Start:  03/20/25    Expected End:  05/19/25

## 2025-07-08 ENCOUNTER — APPOINTMENT (OUTPATIENT)
Dept: PHYSICAL THERAPY | Facility: CLINIC | Age: 55
End: 2025-07-08
Payer: COMMERCIAL

## 2025-07-10 ENCOUNTER — APPOINTMENT (OUTPATIENT)
Dept: ALLERGY | Facility: CLINIC | Age: 55
End: 2025-07-10
Payer: COMMERCIAL

## 2025-07-10 ENCOUNTER — TREATMENT (OUTPATIENT)
Dept: PHYSICAL THERAPY | Facility: CLINIC | Age: 55
End: 2025-07-10
Payer: COMMERCIAL

## 2025-07-10 DIAGNOSIS — M25.562 ACUTE PAIN OF LEFT KNEE: ICD-10-CM

## 2025-07-10 DIAGNOSIS — M17.12 UNILATERAL PRIMARY OSTEOARTHRITIS, LEFT KNEE: ICD-10-CM

## 2025-07-10 PROCEDURE — 97110 THERAPEUTIC EXERCISES: CPT | Mod: GP

## 2025-07-10 NOTE — PROGRESS NOTES
"Physical Therapy    Physical Therapy Treatment    Patient Name: Betzaida Jc  MRN: [id number]  Today's Date: 7/10/2025    Time Entry:   Time Calculation  Start Time: 1346  Stop Time: 1430  Time Calculation (min): 44 min     PT Therapeutic Procedures Time Entry  Therapeutic Exercise Time Entry: 40       Auth: 8 visits approved from 6/6/25 to 8/8/25  Visit Count: 2/8    Assessment:  Initiated some gentle strengthening for R LE which she tolerated without increased pain. Educated about icing for pain R knee.   Plan:   Tap down    Current Problem  1. Acute pain of left knee  Follow Up In Physical Therapy      2. Unilateral primary osteoarthritis, left knee  Follow Up In Physical Therapy              Subjective    R knee bothering her increasingly, thinks she's going to schedule at injection. L knee doing okay, but gets achy since R knee is the \"bad one\" now.   Precautions   Diabetes, HBP, asthma, heart disease, hx of TIA, baker's cyst , h/o DVT (per pt)   Pain   10/10 B knee     Objective   L ROM 0-128    Treatments:  Therapeutic Exercise:  SRB x 5 min  Quad set 5 sec x 20, knee B  SAQ 1# 5 sec x 20 L, x 10 R  SLR 1# 2 x 10 L, x 10 R  Sidelying hip abd 1# 2 x 10 L , x 10 R  DKTC/HS curl on pball x 20  Bridge on pball 2 x 10  LP DL 80# 3 x 10  Ccamb 4 plates x 5 retro, 3 plates x 5 forward  Ccamb 3 plates x 3 lateral ea way  Slant board stretch 30 sec x 2  Supine HS stretch 30 sec x 2 ea  Prone knee flexion stretch 30 sec x 2 ea    Goals:  Active       PT Problem       Pt will increase L knee AROm to 0-120 to facilitate improved gait mechanics.  (Met)       Start:  03/20/25    Expected End:  04/19/25    Resolved:  05/29/25         Pt will amb in clinic independently without gait deviations to indicate improved pain, motion, and strength in LLE.  (Progressing)       Start:  03/20/25    Expected End:  04/19/25            Pt will negotiate one flight of steps reciprocally to facilitate improved mobility in home and " indicate improved LLE strength.  (Progressing)       Start:  03/20/25    Expected End:  04/19/25            The patient will improve score on LEFS by 20 points to indicate decreased pain and increased functional level.         Start:  03/20/25    Expected End:  05/19/25            Patient will be independent with home exercise program for home maintenance.  (Progressing)       Start:  03/20/25    Expected End:  05/19/25

## 2025-07-15 DIAGNOSIS — Z96.652 S/P TKR (TOTAL KNEE REPLACEMENT) USING CEMENT, LEFT: Primary | ICD-10-CM

## 2025-07-15 RX ORDER — CLINDAMYCIN HYDROCHLORIDE 300 MG/1
600 CAPSULE ORAL ONCE
Qty: 2 CAPSULE | Refills: 4 | Status: SHIPPED | OUTPATIENT
Start: 2025-07-15 | End: 2025-07-15

## 2025-07-17 ENCOUNTER — APPOINTMENT (OUTPATIENT)
Dept: PHYSICAL THERAPY | Facility: CLINIC | Age: 55
End: 2025-07-17
Payer: COMMERCIAL

## 2025-07-17 ENCOUNTER — APPOINTMENT (OUTPATIENT)
Dept: ALLERGY | Facility: CLINIC | Age: 55
End: 2025-07-17
Payer: COMMERCIAL

## 2025-07-23 ENCOUNTER — TREATMENT (OUTPATIENT)
Dept: PHYSICAL THERAPY | Facility: CLINIC | Age: 55
End: 2025-07-23
Payer: COMMERCIAL

## 2025-07-23 DIAGNOSIS — M17.12 UNILATERAL PRIMARY OSTEOARTHRITIS, LEFT KNEE: ICD-10-CM

## 2025-07-23 DIAGNOSIS — M25.562 ACUTE PAIN OF LEFT KNEE: ICD-10-CM

## 2025-07-23 PROCEDURE — 97110 THERAPEUTIC EXERCISES: CPT | Mod: GP

## 2025-07-23 NOTE — PROGRESS NOTES
"Physical Therapy    Physical Therapy Treatment    Patient Name: Betzaida Jc  MRN: [id number]  Today's Date: 7/23/2025    Time Entry:   Time Calculation  Start Time: 1343  Stop Time: 1430  Time Calculation (min): 47 min     PT Therapeutic Procedures Time Entry  Therapeutic Exercise Time Entry: 45       Auth: 8 visits approved from 6/6/25 to 8/8/25  Visit Count: 3/8    Assessment:  Re-emphasized icing for pain control in both knees. Did well with treatment today, but did report some aching in L knee and sharp pain once or twice in R knee.   Plan:   [facility]    Current Problem  1. Acute pain of left knee  Follow Up In Physical Therapy      2. Unilateral primary osteoarthritis, left knee  Follow Up In Physical Therapy              Subjective    Yesterday was so bad in both knees, took tylenol arthritis.   Precautions   Diabetes, HBP, asthma, heart disease, hx of TIA, baker's cyst , h/o DVT (per pt)   Pain   6/10 B knee     Objective   Antalgic gait    Treatments:  Therapeutic Exercise:  SRB x 5 min  Quad set 5 sec x 20, knee B  SAQ 2# 5 sec x 20 L, x 20 R  SLR 2# x 15 L, x 10 R  Sidelying hip abd 2# x 15 L , x 10 R  DKTC/HS curl on pball x 20  Bridge on pball 2 x 10  LP DL 80# 3 x 10  4\" tap down x 10 L   AP on discs x 15  Lat amb x 4 laps (green)  Slant board stretch 30 sec x 3  Supine HS stretch 30 sec x 2 ea  Prone knee flexion stretch 30 sec x 2 ea    Ccamb 4 plates x 5 retro, 3 plates x 5 forward - not today    Goals:  Active       PT Problem       Pt will increase L knee AROm to 0-120 to facilitate improved gait mechanics.  (Met)       Start:  03/20/25    Expected End:  04/19/25    Resolved:  05/29/25         Pt will amb in clinic independently without gait deviations to indicate improved pain, motion, and strength in LLE.  (Progressing)       Start:  03/20/25    Expected End:  04/19/25            Pt will negotiate one flight of steps reciprocally to facilitate improved mobility in home and indicate improved LLE " strength.  (Progressing)       Start:  03/20/25    Expected End:  04/19/25            The patient will improve score on LEFS by 20 points to indicate decreased pain and increased functional level.         Start:  03/20/25    Expected End:  05/19/25            Patient will be independent with home exercise program for home maintenance.  (Progressing)       Start:  03/20/25    Expected End:  05/19/25

## 2025-07-31 ENCOUNTER — APPOINTMENT (OUTPATIENT)
Dept: SLEEP MEDICINE | Facility: CLINIC | Age: 55
End: 2025-07-31
Payer: COMMERCIAL

## 2025-07-31 ENCOUNTER — TREATMENT (OUTPATIENT)
Dept: PHYSICAL THERAPY | Facility: CLINIC | Age: 55
End: 2025-07-31
Payer: COMMERCIAL

## 2025-07-31 ENCOUNTER — APPOINTMENT (OUTPATIENT)
Dept: PHYSICAL THERAPY | Facility: CLINIC | Age: 55
End: 2025-07-31
Payer: COMMERCIAL

## 2025-07-31 DIAGNOSIS — M25.562 ACUTE PAIN OF LEFT KNEE: ICD-10-CM

## 2025-07-31 DIAGNOSIS — M17.12 UNILATERAL PRIMARY OSTEOARTHRITIS, LEFT KNEE: ICD-10-CM

## 2025-07-31 PROCEDURE — 97110 THERAPEUTIC EXERCISES: CPT | Mod: GP

## 2025-07-31 NOTE — PROGRESS NOTES
"Physical Therapy    Physical Therapy Treatment    Patient Name: Betzaida Jc  MRN: [id number]  Today's Date: 7/31/2025    Time Entry:   Time Calculation  Start Time: 1553  Stop Time: 1639  Time Calculation (min): 46 min     PT Therapeutic Procedures Time Entry  Therapeutic Exercise Time Entry: 46       Auth: 8 visits approved from 6/6/25 to 8/8/25  Visit Count: 4/8    Assessment:  Pt reported stretching exercises felt good after working on her feet for 10 hrs. Able to re-introduce ccamb today as planned. Reported knee pain felt better 4/10 EOS.     Plan:   Increase resistance for lat amb (blue TB).     Current Problem  1. Acute pain of left knee  Follow Up In Physical Therapy      2. Unilateral primary osteoarthritis, left knee  Follow Up In Physical Therapy          Subjective    \"Just came from working a 10 hr shift. Both the knees are super achy.\"     Precautions   Diabetes, HBP, asthma, heart disease, hx of TIA, baker's cyst , h/o DVT (per pt)   Pain   7/10 B achy knee pain, 4/10 EOS    Objective   Mild tightness B hamstrings and hip flexors    Treatments:  Therapeutic Exercise:  SRB x 5 min  Quad set 5 sec x 20, knee B  SAQ 2# 5 sec x 20 L, x 20 R  SLR 2# x 15 L, x 10 R  DKTC/HS curl on pball x 20  Bridge on pball 2 x 10  Supine HS stretch 30 sec x 2 ea   Prone knee flexion stretch 30 sec x 2 ea   Sidelying hip abd 2# x 15 L , x 15 R  LP DL 80# 3 x 10  4\" tap down x 10 L  Slant board stretch 30 sec x 3  AP on discs x 15  Lat amb x 3 laps (green)  Ccamb 4 plates x 5 retro, 3 plates x 5 forward       Goals:  Active       PT Problem       Pt will increase L knee AROm to 0-120 to facilitate improved gait mechanics.  (Met)       Start:  03/20/25    Expected End:  04/19/25    Resolved:  05/29/25         Pt will amb in clinic independently without gait deviations to indicate improved pain, motion, and strength in LLE.  (Progressing)       Start:  03/20/25    Expected End:  04/19/25            Pt will negotiate one " flight of steps reciprocally to facilitate improved mobility in home and indicate improved LLE strength.  (Progressing)       Start:  03/20/25    Expected End:  04/19/25            The patient will improve score on LEFS by 20 points to indicate decreased pain and increased functional level.         Start:  03/20/25    Expected End:  05/19/25            Patient will be independent with home exercise program for home maintenance.  (Progressing)       Start:  03/20/25    Expected End:  05/19/25

## 2025-08-05 ENCOUNTER — TREATMENT (OUTPATIENT)
Dept: PHYSICAL THERAPY | Facility: CLINIC | Age: 55
End: 2025-08-05
Payer: COMMERCIAL

## 2025-08-05 DIAGNOSIS — M17.12 UNILATERAL PRIMARY OSTEOARTHRITIS, LEFT KNEE: ICD-10-CM

## 2025-08-05 DIAGNOSIS — M25.562 ACUTE PAIN OF LEFT KNEE: ICD-10-CM

## 2025-08-05 PROCEDURE — 97110 THERAPEUTIC EXERCISES: CPT | Mod: GP

## 2025-08-05 NOTE — PROGRESS NOTES
"Physical Therapy    Physical Therapy Treatment and Discharge    Patient Name: Betzaida Jc  MRN: [id number]  Today's Date: 8/5/2025    Time Entry:   Time Calculation  Start Time: 1402  Stop Time: 1440  Time Calculation (min): 38 min     PT Therapeutic Procedures Time Entry  Therapeutic Exercise Time Entry: 38       Auth: 8 visits approved from 6/6/25 to 8/8/25  Visit Count: 5/8    Assessment:  Pt made really good progress with her left knee following L TKA. Today she shows maintenance of great ROM, improvements in strength, and her function has improved greatly based on LEFS score. However, her R knee has become gradually more painful and stiff since she has been coming for her L knee, so we discussed working on ROM and gentle strengthening and reaching out to surgeon for follow up about maybe having this knee replaced. Pt in agreement with plan.   Plan:   Discharge to [facility]    Current Problem  1. Acute pain of left knee  Follow Up In Physical Therapy      2. Unilateral primary osteoarthritis, left knee  Follow Up In Physical Therapy          Subjective    \"Just came from working a 10 hr shift. Both the knees are super achy.\"     Precautions   Diabetes, HBP, asthma, heart disease, hx of TIA, baker's cyst , h/o DVT (per pt)   Pain   7/10 B achy knee pain, 4/10 EOS    Objective   ROM/Flexibility:                          Knee Flexion R / L :      110 / 127                          Knee Extension R / L :  5 / 5                 Strength R / L :                          Hip Flexion:          5 / 5                          Hip Extension:     5 / 5                          Hip Abduction:    5 / 4+                          Hip Adduction:    5 / 5                          Knee Extension:   5 / 5                          Knee Flexion:       5 / 5                          Ankle DF:             5 / 5                          Ankle PF:              5 / 5                 Gait: Amb without assistive device, slight decreased R " stance time                 Outcome Measure:                          LEFS : 54 / 80    Treatments:  Therapeutic Exercise:  Quad set 5 sec x 20, knee B  SAQ 2# 5 sec x 20 L, x 20 R  SLR 2# x 15 L, x 10 R  DKTC/HS curl on pball x 20  Bridge on pball 2 x 10  Supine HS stretch 30 sec x 2 ea   Prone knee flexion stretch 30 sec x 2 ea   Sidelying hip abd 2# x 15 L , x 15 R  Recheck    Goals:  Active       PT Problem       Pt will increase L knee AROm to 0-120 to facilitate improved gait mechanics.  (Met)       Start:  03/20/25    Expected End:  04/19/25    Resolved:  05/29/25         Pt will amb in clinic independently without gait deviations to indicate improved pain, motion, and strength in LLE.  (Progressing)       Start:  03/20/25    Expected End:  04/19/25            Pt will negotiate one flight of steps reciprocally to facilitate improved mobility in home and indicate improved LLE strength.  (Progressing)       Start:  03/20/25    Expected End:  04/19/25            The patient will improve score on LEFS by 20 points to indicate decreased pain and increased functional level.         Start:  03/20/25    Expected End:  05/19/25            Patient will be independent with home exercise program for home maintenance.  (Progressing)       Start:  03/20/25    Expected End:  05/19/25

## 2025-08-20 ENCOUNTER — APPOINTMENT (OUTPATIENT)
Dept: RHEUMATOLOGY | Facility: CLINIC | Age: 55
End: 2025-08-20
Payer: COMMERCIAL

## 2025-09-02 DIAGNOSIS — Z96.652 S/P TKR (TOTAL KNEE REPLACEMENT) USING CEMENT, LEFT: Primary | ICD-10-CM

## 2025-09-02 RX ORDER — CLINDAMYCIN HYDROCHLORIDE 300 MG/1
600 CAPSULE ORAL ONCE
Qty: 2 CAPSULE | Refills: 4 | Status: SHIPPED | OUTPATIENT
Start: 2025-09-02 | End: 2025-09-02

## 2025-09-23 ENCOUNTER — APPOINTMENT (OUTPATIENT)
Dept: RHEUMATOLOGY | Facility: CLINIC | Age: 55
End: 2025-09-23
Payer: COMMERCIAL

## 2025-09-25 ENCOUNTER — APPOINTMENT (OUTPATIENT)
Dept: ALLERGY | Facility: CLINIC | Age: 55
End: 2025-09-25
Payer: COMMERCIAL

## 2026-01-08 ENCOUNTER — APPOINTMENT (OUTPATIENT)
Dept: SLEEP MEDICINE | Facility: CLINIC | Age: 56
End: 2026-01-08
Payer: COMMERCIAL

## (undated) DEVICE — GLOVE, SURGICAL, PROTEXIS PI ORTHO, 7.0, PF, LF

## (undated) DEVICE — SYRINGE, 50 CC, LUER LOCK

## (undated) DEVICE — EVACUATOR, WOUND, CLOSED, 3 SPRING, 400 CC, Y CONNECTING TUBE

## (undated) DEVICE — SUTURE, VICRYL PLUS, 0, 8X18IN, CT-1, UND, BRAIDED

## (undated) DEVICE — CUFF, TOURNIQUET, 30 X 4, DUAL PORT/SNGL BLADDER, DISP, LF

## (undated) DEVICE — GLOVE, SURGICAL, PROTEXIS PI MICRO, 7.5, PF, LF

## (undated) DEVICE — Device

## (undated) DEVICE — HOOD, SURGICAL, FLYTE SURGICOOL

## (undated) DEVICE — SUTURE, MONOCRYL, 3-0, 27 IN, PS-2, UNDYED

## (undated) DEVICE — TAPE, SURGICAL, FOAM, MICROFOAM, HYPOALLERGENIC, 3 IN X 5.5 YD

## (undated) DEVICE — CEMENT, MIXEVAC III, 10S BOWL, KNEES

## (undated) DEVICE — GLOVE, SURGICAL, PROTEXIS PI MICRO, 6.5, PF, LF

## (undated) DEVICE — SUTURE, ETHIBOND, P2, V-37, 30 IN, GREEN

## (undated) DEVICE — SOLUTION, IRRIGATION, STERILE WATER, 1000 ML, POUR BOTTLE

## (undated) DEVICE — TOWEL, SURGICAL, NEURO, O/R, 16 X 26, BLUE, STERILE

## (undated) DEVICE — DRAPE, INCISE, STERI DRAPE, LARGE, 23 X 17 IN, DISPOSABLE, STERILE

## (undated) DEVICE — DRAIN, WOUND, ROUND, W/TROCAR, HOLE PATTERN, 10 IN, MEDIUM/LARGE, 3/16 X 49 IN

## (undated) DEVICE — GLOVE, SURGICAL, PROTEXIS PI ORTHO, 8.0, PF, LF

## (undated) DEVICE — DRAPE, IRRIGATION, W/POUCH, ADHESIVE STRIP, STERI DRAPE, 19 X 23 IN, DISPOSABLE, STERILE

## (undated) DEVICE — 10IN STRYKER SMOKE EVACUATION TUBING

## (undated) DEVICE — DRAPE, ISOLATION, ANTIMICROBIAL, W/POUCH, IOBAN 2, STERI DRAPE, 125 X 83 IN, DISPOSABLE, STERILE

## (undated) DEVICE — SUTURE, CTD, VICRYL, 2-0, UND, BR, CT-2

## (undated) DEVICE — TUBING, IRRIGATION, HIGH FLOW, HAND PIECE SET

## (undated) DEVICE — BLADE, SAW, DUAL SAGITTAL, 1.9 X 90 X 30

## (undated) DEVICE — 3/16IN OD X 49IN, MEDIUM-LARGE SINGLE ROUND SILICONE DRAIN W/TROCAR, 10IN HOLE PATTERN

## (undated) DEVICE — STRIP, SKIN CLOSURE, STERI STRIP, REINFORCED, 0.5 X 4 IN

## (undated) DEVICE — SALINE NORMAL (100/PK)